# Patient Record
Sex: MALE | Race: WHITE | NOT HISPANIC OR LATINO | Employment: OTHER | URBAN - METROPOLITAN AREA
[De-identification: names, ages, dates, MRNs, and addresses within clinical notes are randomized per-mention and may not be internally consistent; named-entity substitution may affect disease eponyms.]

---

## 2017-03-16 ENCOUNTER — OFFICE VISIT (OUTPATIENT)
Dept: FAMILY MEDICINE | Facility: OTHER | Age: 62
End: 2017-03-16
Attending: FAMILY MEDICINE
Payer: COMMERCIAL

## 2017-03-16 VITALS
WEIGHT: 205 LBS | SYSTOLIC BLOOD PRESSURE: 140 MMHG | BODY MASS INDEX: 28.7 KG/M2 | HEART RATE: 86 BPM | HEIGHT: 71 IN | DIASTOLIC BLOOD PRESSURE: 82 MMHG | RESPIRATION RATE: 14 BRPM | TEMPERATURE: 98.7 F

## 2017-03-16 DIAGNOSIS — R07.0 THROAT PAIN: ICD-10-CM

## 2017-03-16 DIAGNOSIS — R68.89 FLU-LIKE SYMPTOMS: Primary | ICD-10-CM

## 2017-03-16 LAB
DEPRECATED S PYO AG THROAT QL EIA: NORMAL
FLUAV+FLUBV AG SPEC QL: NEGATIVE
FLUAV+FLUBV AG SPEC QL: NORMAL
MICRO REPORT STATUS: NORMAL
SPECIMEN SOURCE: NORMAL
SPECIMEN SOURCE: NORMAL

## 2017-03-16 PROCEDURE — 87081 CULTURE SCREEN ONLY: CPT | Performed by: FAMILY MEDICINE

## 2017-03-16 PROCEDURE — 87880 STREP A ASSAY W/OPTIC: CPT | Performed by: FAMILY MEDICINE

## 2017-03-16 PROCEDURE — 99213 OFFICE O/P EST LOW 20 MIN: CPT | Performed by: FAMILY MEDICINE

## 2017-03-16 PROCEDURE — 87804 INFLUENZA ASSAY W/OPTIC: CPT | Mod: 59 | Performed by: FAMILY MEDICINE

## 2017-03-16 RX ORDER — CODEINE PHOSPHATE AND GUAIFENESIN 10; 100 MG/5ML; MG/5ML
1-2 SOLUTION ORAL EVERY 6 HOURS PRN
Qty: 180 ML | Refills: 0 | Status: SHIPPED | OUTPATIENT
Start: 2017-03-16 | End: 2018-04-27

## 2017-03-16 NOTE — PROGRESS NOTES
"  SUBJECTIVE:                                                    Jose C Corona is a 61 year old male who presents to clinic today for the following health issues:    Acute Illness   Acute illness concerns: cough, congestion  Onset: 2 days ago (had been sick about one month ago, symptoms resolved, then \"returned\")    Fever: no    Chills/Sweats: YES- last night    Headache (location?): YES    Sinus Pressure:no    Conjunctivitis:  no    Ear Pain: no    Rhinorrhea: YES    Congestion: YES    Sore Throat: YES     Cough: YES-non-productive    Wheeze: no    Decreased Appetite: no    Nausea: no    Vomiting: no    Diarrhea:  no    Dysuria/Freq.: no    Fatigue/Achiness: no    Sick/Strep Exposure: no     Therapies Tried and outcome: Dayquil/Nyquil, helps some        Problem list and histories reviewed & adjusted, as indicated.  Additional history: as documented    Patient Active Problem List   Diagnosis     Mild persistent asthma     ACP (advance care planning)     Past Surgical History   Procedure Laterality Date     Circumcision       Orthopedic surgery  12/2013     left knee miniscus repair     Colonoscopy  2007     normal       Social History   Substance Use Topics     Smoking status: Never Smoker     Smokeless tobacco: Never Used     Alcohol use Yes      Comment: weekends, beer     Family History   Problem Relation Age of Onset     CANCER Mother 54     lung     HEART DISEASE Father 75     MI     Other Cancer Brother 65     PASSED FROM PROSTATE CANCER     Genitourinary Problems Brother      enlarged prostate         Current Outpatient Prescriptions   Medication Sig Dispense Refill     guaiFENesin-codeine (CHERATUSSIN AC) 100-10 MG/5ML SOLN solution Take 5-10 mLs by mouth every 6 hours as needed 180 mL 0     Beclomethasone Dipropionate (BECLOVENT IN)        fluticasone (FLOVENT HFA) 110 MCG/ACT inhaler Inhale 1 puff into the lungs 2 times daily 1 Inhaler 11     [DISCONTINUED] albuterol (PROAIR HFA, PROVENTIL HFA, VENTOLIN " "HFA) 108 (90 BASE) MCG/ACT inhaler Inhale 2 puffs into the lungs every 6 hours       No Known Allergies    Reviewed and updated as needed this visit by clinical staff  Tobacco  Allergies  Meds       Reviewed and updated as needed this visit by Provider         ROS:  Constitutional, HEENT, cardiovascular, pulmonary, gi and gu systems are negative, except as otherwise noted.    OBJECTIVE:                                                    /82 (BP Location: Left arm, Patient Position: Chair, Cuff Size: Adult Large)  Pulse 86  Temp 98.7  F (37.1  C) (Tympanic)  Resp 14  Ht 5' 10.75\" (1.797 m)  Wt 205 lb (93 kg)  BMI 28.79 kg/m2  Body mass index is 28.79 kg/(m^2).  GENERAL: healthy, alert and no distress  EYES: Eyes grossly normal to inspection, PERRL and conjunctivae and sclerae normal  HENT: ear canals and TM's normal, nose and mouth without ulcers or lesions  NECK: no adenopathy  RESP: lungs clear to auscultation - no rales, rhonchi or wheezes  CV: regular rate and rhythm, normal S1 S2, no S3 or S4, no murmur, click or rub, no peripheral edema and peripheral pulses strong  PSYCH: mentation appears normal, affect normal/bright    Diagnostic Test Results:  Results for orders placed or performed in visit on 03/16/17 (from the past 24 hour(s))   Influenza A/B antigen   Result Value Ref Range    Influenza A/B Agn Specimen Nasal     Influenza A Negative NEG    Influenza B  NEG     Negative   Test results must be correlated with clinical data. If necessary, results   should be confirmed by a molecular assay or viral culture.     Rapid strep screen   Result Value Ref Range    Specimen Description Throat     Rapid Strep A Screen       NEGATIVE: No Group A streptococcal antigen detected by immunoassay, await   culture report.      Micro Report Status FINAL 03/16/2017         ASSESSMENT/PLAN:                                                      1. Flu-like symptoms  Symptomatic cares recommended.  Use Flonase during the " day (has at home) and use cough medicine at night.  Follow-up if fevers develop or if symptoms aren't improving.  - Influenza A/B antigen  - Beta strep group A culture  - guaiFENesin-codeine (CHERATUSSIN AC) 100-10 MG/5ML SOLN solution; Take 5-10 mLs by mouth every 6 hours as needed  Dispense: 180 mL; Refill: 0    2. Throat pain  - Rapid strep screen        Jackelyn Avery MD  [facility]

## 2017-03-16 NOTE — MR AVS SNAPSHOT
"              After Visit Summary   3/16/2017    Jose C Corona    MRN: [id number]           Patient Information     Date Of Birth          1955        Visit Information        Provider Department      3/16/2017 8:45 AM Jackelyn Avery MD [facility]        Today's Diagnoses     Flu-like symptoms    -  1    Throat pain           Follow-ups after your visit        Follow-up notes from your care team     Return if symptoms worsen or fail to improve.      Who to contact     If you have questions or need follow up information about today's clinic visit or your schedule please contact [facility] directly at [phone].  Normal or non-critical lab and imaging results will be communicated to you by MyChart, letter or phone within 4 business days after the clinic has received the results. If you do not hear from us within 7 days, please contact the clinic through Oradhart or phone. If you have a critical or abnormal lab result, we will notify you by phone as soon as possible.  Submit refill requests through [employer] or call your pharmacy and they will forward the refill request to us. Please allow 3 business days for your refill to be completed.          Additional Information About Your Visit        MyChart Information     [employer] lets you send messages to your doctor, view your test results, renew your prescriptions, schedule appointments and more. To sign up, go to [email or web address] . Click on \"Log in\" on the left side of the screen, which will take you to the Welcome page. Then click on \"Sign up Now\" on the right side of the page.     You will be asked to enter the access code listed below, as well as some personal information. Please follow the directions to create your username and password.     Your access code is: [email or web address]  Expires: 2017  9:35 AM     Your access code will  in 90 days. If you need help or a new code, please call your [facility] [place] or " "[phone].        Care EveryWhere ID     This is your Care EveryWhere ID. This could be used by other organizations to access your [place] medical records  [id number]        Your Vitals Were     Pulse Temperature Respirations Height BMI (Body Mass Index)       86 98.7  F (37.1  C) (Tympanic) 14 5' 10.75\" (1.797 m) 28.79 kg/m2        Blood Pressure from Last 3 Encounters:   03/16/17 140/82   09/02/16 118/80   05/28/15 124/84    Weight from Last 3 Encounters:   03/16/17 205 lb (93 kg)   09/02/16 202 lb (91.6 kg)   05/28/15 210 lb (95.3 kg)              We Performed the Following     Beta strep group A culture     Influenza A/B antigen     Rapid strep screen          Today's Medication Changes          These changes are accurate as of: 3/16/17  9:35 AM.  If you have any questions, ask your nurse or doctor.               Start taking these medicines.        Dose/Directions    guaiFENesin-codeine 100-10 MG/5ML Soln solution   Commonly known as:  CHERATUSSIN AC   Used for:  Flu-like symptoms   Started by:  Jackelyn Avery MD        Dose:  1-2 tsp.   Take 5-10 mLs by mouth every 6 hours as needed   Quantity:  180 mL   Refills:  0            Where to get your medicines      Some of these will need a paper prescription and others can be bought over the counter.  Ask your nurse if you have questions.     Bring a paper prescription for each of these medications     guaiFENesin-codeine 100-10 MG/5ML Soln solution                Primary Care Provider Office Phone # Fax #    Jackelyn Avery [phone]       [facility] [address] [address]        Thank you!     Thank you for choosing [facility]  for your care. Our goal is always to provide you with excellent care. Hearing back from our patients is one way we can continue to improve our services. Please take a few minutes to complete the written survey that you may receive in the mail after your " visit with us. Thank you!             Your Updated Medication List - Protect others around you: Learn how to safely use, store and throw away your medicines at www.disposemymeds.org.          This list is accurate as of: 3/16/17  9:35 AM.  Always use your most recent med list.                   Brand Name Dispense Instructions for use    BECLOVENT IN          fluticasone 110 MCG/ACT Inhaler    FLOVENT HFA    1 Inhaler    Inhale 1 puff into the lungs 2 times daily       guaiFENesin-codeine 100-10 MG/5ML Soln solution    CHERATUSSIN AC    180 mL    Take 5-10 mLs by mouth every 6 hours as needed

## 2017-03-16 NOTE — NURSING NOTE
"Chief Complaint   Patient presents with     URI     Patient reports having a headache, sore throat and chest congestion.       Initial /82 (BP Location: Left arm, Patient Position: Chair, Cuff Size: Adult Large)  Pulse 86  Temp 98.7  F (37.1  C) (Tympanic)  Resp 14  Ht 5' 10.75\" (1.797 m)  Wt 205 lb (93 kg)  BMI 28.79 kg/m2 Estimated body mass index is 28.79 kg/(m^2) as calculated from the following:    Height as of this encounter: 5' 10.75\" (1.797 m).    Weight as of this encounter: 205 lb (93 kg).  Medication Reconciliation: complete   Elsa Serrano      "

## 2017-03-18 LAB
BACTERIA SPEC CULT: NORMAL
MICRO REPORT STATUS: NORMAL
SPECIMEN SOURCE: NORMAL

## 2017-03-20 ENCOUNTER — TELEPHONE (OUTPATIENT)
Dept: FAMILY MEDICINE | Facility: OTHER | Age: 62
End: 2017-03-20

## 2017-03-20 DIAGNOSIS — J06.9 UPPER RESPIRATORY TRACT INFECTION, UNSPECIFIED TYPE: Primary | ICD-10-CM

## 2017-03-20 RX ORDER — AZITHROMYCIN 250 MG/1
TABLET, FILM COATED ORAL
Qty: 6 TABLET | Refills: 0 | Status: SHIPPED | OUTPATIENT
Start: 2017-03-20 | End: 2021-10-04

## 2017-03-20 NOTE — TELEPHONE ENCOUNTER
Sen Thursday and given cough medication the cold has now settled in lungs with a lot of coughing up phlegm and due to asthma requesting mediation z pack has worked in the past

## 2017-09-11 DIAGNOSIS — J45.30 MILD PERSISTENT ASTHMA WITHOUT COMPLICATION: ICD-10-CM

## 2017-09-12 RX ORDER — DEXAMETHASONE 4 MG/1
TABLET ORAL
Qty: 12 G | Refills: 2 | Status: SHIPPED | OUTPATIENT
Start: 2017-09-12 | End: 2018-04-27

## 2018-04-13 ENCOUNTER — TRANSFERRED RECORDS (OUTPATIENT)
Dept: HEALTH INFORMATION MANAGEMENT | Facility: CLINIC | Age: 63
End: 2018-04-13

## 2018-04-27 ENCOUNTER — OFFICE VISIT (OUTPATIENT)
Dept: FAMILY MEDICINE | Facility: OTHER | Age: 63
End: 2018-04-27
Attending: FAMILY MEDICINE
Payer: COMMERCIAL

## 2018-04-27 VITALS
TEMPERATURE: 97.2 F | HEART RATE: 50 BPM | HEIGHT: 71 IN | WEIGHT: 207 LBS | DIASTOLIC BLOOD PRESSURE: 72 MMHG | RESPIRATION RATE: 15 BRPM | SYSTOLIC BLOOD PRESSURE: 126 MMHG | OXYGEN SATURATION: 97 % | BODY MASS INDEX: 28.98 KG/M2

## 2018-04-27 DIAGNOSIS — Z00.00 ROUTINE GENERAL MEDICAL EXAMINATION AT A HEALTH CARE FACILITY: Primary | ICD-10-CM

## 2018-04-27 DIAGNOSIS — J45.30 MILD PERSISTENT ASTHMA WITHOUT COMPLICATION: ICD-10-CM

## 2018-04-27 DIAGNOSIS — Z12.5 SPECIAL SCREENING FOR MALIGNANT NEOPLASM OF PROSTATE: ICD-10-CM

## 2018-04-27 DIAGNOSIS — Z12.11 SPECIAL SCREENING FOR MALIGNANT NEOPLASMS, COLON: ICD-10-CM

## 2018-04-27 PROCEDURE — G0103 PSA SCREENING: HCPCS | Performed by: FAMILY MEDICINE

## 2018-04-27 PROCEDURE — 80061 LIPID PANEL: CPT | Performed by: FAMILY MEDICINE

## 2018-04-27 PROCEDURE — 99396 PREV VISIT EST AGE 40-64: CPT | Performed by: FAMILY MEDICINE

## 2018-04-27 PROCEDURE — 36415 COLL VENOUS BLD VENIPUNCTURE: CPT | Performed by: FAMILY MEDICINE

## 2018-04-27 RX ORDER — FLUTICASONE PROPIONATE 110 UG/1
AEROSOL, METERED RESPIRATORY (INHALATION)
Qty: 12 G | Refills: 5 | Status: CANCELLED | OUTPATIENT
Start: 2018-04-27

## 2018-04-27 RX ORDER — FLUTICASONE PROPIONATE 110 UG/1
1 AEROSOL, METERED RESPIRATORY (INHALATION) 2 TIMES DAILY
Qty: 12 G | Refills: 5 | Status: SHIPPED | OUTPATIENT
Start: 2018-04-27 | End: 2019-06-03

## 2018-04-27 ASSESSMENT — ANXIETY QUESTIONNAIRES
GAD7 TOTAL SCORE: 0
6. BECOMING EASILY ANNOYED OR IRRITABLE: NOT AT ALL
1. FEELING NERVOUS, ANXIOUS, OR ON EDGE: NOT AT ALL
2. NOT BEING ABLE TO STOP OR CONTROL WORRYING: NOT AT ALL
7. FEELING AFRAID AS IF SOMETHING AWFUL MIGHT HAPPEN: NOT AT ALL
4. TROUBLE RELAXING: NOT AT ALL
5. BEING SO RESTLESS THAT IT IS HARD TO SIT STILL: NOT AT ALL
3. WORRYING TOO MUCH ABOUT DIFFERENT THINGS: NOT AT ALL

## 2018-04-27 ASSESSMENT — ASTHMA QUESTIONNAIRES
QUESTION_1 LAST FOUR WEEKS HOW MUCH OF THE TIME DID YOUR ASTHMA KEEP YOU FROM GETTING AS MUCH DONE AT WORK, SCHOOL OR AT HOME: NONE OF THE TIME
QUESTION_4 LAST FOUR WEEKS HOW OFTEN HAVE YOU USED YOUR RESCUE INHALER OR NEBULIZER MEDICATION (SUCH AS ALBUTEROL): NOT AT ALL
QUESTION_2 LAST FOUR WEEKS HOW OFTEN HAVE YOU HAD SHORTNESS OF BREATH: NOT AT ALL
QUESTION_5 LAST FOUR WEEKS HOW WOULD YOU RATE YOUR ASTHMA CONTROL: COMPLETELY CONTROLLED
QUESTION_3 LAST FOUR WEEKS HOW OFTEN DID YOUR ASTHMA SYMPTOMS (WHEEZING, COUGHING, SHORTNESS OF BREATH, CHEST TIGHTNESS OR PAIN) WAKE YOU UP AT NIGHT OR EARLIER THAN USUAL IN THE MORNING: NOT AT ALL
ACT_TOTALSCORE: 25

## 2018-04-27 ASSESSMENT — PAIN SCALES - GENERAL: PAINLEVEL: NO PAIN (0)

## 2018-04-27 NOTE — NURSING NOTE
"Chief Complaint   Patient presents with     Physical     small bowl of soup for lunch     Asthma       Initial /72 (BP Location: Left arm, Patient Position: Sitting, Cuff Size: Adult Large)  Pulse 50  Temp 97.2  F (36.2  C) (Tympanic)  Resp 15  Ht 5' 10.75\" (1.797 m)  Wt 207 lb (93.9 kg)  SpO2 97%  BMI 29.08 kg/m2 Estimated body mass index is 29.08 kg/(m^2) as calculated from the following:    Height as of this encounter: 5' 10.75\" (1.797 m).    Weight as of this encounter: 207 lb (93.9 kg).  Medication Reconciliation: complete     Alyssa Gill      "

## 2018-04-27 NOTE — PROGRESS NOTES
SUBJECTIVE:   CC: Jose C Corona is an 62 year old male who presents for preventative health visit.     Healthy Habits:    Do you get at least three servings of calcium containing foods daily (dairy, green leafy vegetables, etc.)? yes    Amount of exercise or daily activities, outside of work: active hobbies and chores    Problems taking medications regularly No    Medication side effects: No    Have you had an eye exam in the past two years? yes    Do you see a dentist twice per year? yes    Do you have sleep apnea, excessive snoring or daytime drowsiness?no       Asthma Follow-Up    Was ACT completed today?    Yes    ACT Total Scores 4/27/2018   ACT TOTAL SCORE -   ASTHMA ER VISITS -   ASTHMA HOSPITALIZATIONS -   ACT TOTAL SCORE (Goal Greater than or Equal to 20) 25   In the past 12 months, how many times did you visit the emergency room for your asthma without being admitted to the hospital? 0       Recent asthma triggers that patient is dealing with: pollens and animal dander        Today's PHQ-2 Score:   PHQ-2 ( 1999 Pfizer) 9/2/2016 5/28/2015   Q1: Little interest or pleasure in doing things 0 0   Q2: Feeling down, depressed or hopeless 0 0   PHQ-2 Score 0 0       Abuse: Current or Past(Physical, Sexual or Emotional)- No  Do you feel safe in your environment - Yes    Social History   Substance Use Topics     Smoking status: Never Smoker     Smokeless tobacco: Never Used     Alcohol use Yes      Comment: weekends, beer      If you drink alcohol do you typically have >3 drinks per day or >7 drinks per week? No                      Last PSA:   PSA   Date Value Ref Range Status   09/01/2016 3.78 0 - 4 ug/L Final     Comment:     Assay Method:  Chemiluminescence using Siemens Vista analyzer     Patient is due for colon cancer screening and would like referral to see Dr. Kendall.    Reviewed orders with patient. Reviewed health maintenance and updated orders accordingly - Yes  Patient Active Problem List    Diagnosis     Mild persistent asthma     ACP (advance care planning)     Past Surgical History:   Procedure Laterality Date     CIRCUMCISION       COLONOSCOPY  2007    normal     ORTHOPEDIC SURGERY  12/2013    left knee miniscus repair       Social History   Substance Use Topics     Smoking status: Never Smoker     Smokeless tobacco: Never Used     Alcohol use Yes      Comment: weekends, beer     Family History   Problem Relation Age of Onset     CANCER Mother 54     lung     HEART DISEASE Father 75     MI     Other Cancer Brother 65     PASSED FROM PROSTATE CANCER     Genitourinary Problems Brother      enlarged prostate         Current Outpatient Prescriptions   Medication Sig Dispense Refill     fluticasone (FLOVENT HFA) 110 MCG/ACT Inhaler Inhale 1 puff into the lungs 2 times daily 12 g 5     Beclomethasone Dipropionate (BECLOVENT IN)        [DISCONTINUED] albuterol (PROAIR HFA, PROVENTIL HFA, VENTOLIN HFA) 108 (90 BASE) MCG/ACT inhaler Inhale 2 puffs into the lungs every 6 hours       Allergies   Allergen Reactions     Seasonal Allergies        Reviewed and updated as needed this visit by clinical staff  Tobacco  Allergies  Meds  Problems  Med Hx  Surg Hx  Fam Hx  Soc Hx          Reviewed and updated as needed this visit by Provider            ROS:  C: NEGATIVE for fever, chills, change in weight  I: NEGATIVE for worrisome rashes, moles or lesions  E: NEGATIVE for vision changes or irritation  ENT: NEGATIVE for ear, mouth and throat problems  R: NEGATIVE for significant cough or SOB  CV: NEGATIVE for chest pain, palpitations or peripheral edema  GI: NEGATIVE for nausea, abdominal pain, heartburn, or change in bowel habits   male: negative for dysuria, hematuria, decreased urinary stream, erectile dysfunction, urethral discharge  M: NEGATIVE for significant arthralgias or myalgia  N: NEGATIVE for weakness, dizziness or paresthesias  P: NEGATIVE for changes in mood or affect    OBJECTIVE:   /72  "(BP Location: Left arm, Patient Position: Sitting, Cuff Size: Adult Large)  Pulse 50  Temp 97.2  F (36.2  C) (Tympanic)  Resp 15  Ht 5' 10.75\" (1.797 m)  Wt 207 lb (93.9 kg)  SpO2 97%  BMI 29.08 kg/m2  EXAM:  GENERAL: healthy, alert and no distress  EYES: Eyes grossly normal to inspection, PERRL and conjunctivae and sclerae normal  HENT: ear canals and TM's normal, nose and mouth without ulcers or lesions  NECK: no adenopathy  RESP: lungs clear to auscultation - no rales, rhonchi or wheezes  CV: regular rate and rhythm, normal S1 S2, no S3 or S4, no murmur, click or rub, no peripheral edema and peripheral pulses strong  ABDOMEN: soft, nontender, no hepatosplenomegaly, no masses and bowel sounds normal  MS: no gross musculoskeletal defects noted, no edema  SKIN: no suspicious lesions or rashes  NEURO: Normal strength and tone, mentation intact and speech normal  PSYCH: mentation appears normal, affect normal/bright    ASSESSMENT/PLAN:       ICD-10-CM    1. Routine general medical examination at a health care facility Z00.00 Lipid Profile   2. Mild persistent asthma without complication J45.30 fluticasone (FLOVENT HFA) 110 MCG/ACT Inhaler   3. Special screening for malignant neoplasms, colon Z12.11 GENERAL SURG ADULT REFERRAL   4. Special screening for malignant neoplasm of prostate Z12.5 PSA, screen       COUNSELING:  Reviewed preventive health counseling, as reflected in patient instructions       reports that he has never smoked. He has never used smokeless tobacco.    Estimated body mass index is 29.08 kg/(m^2) as calculated from the following:    Height as of this encounter: 5' 10.75\" (1.797 m).    Weight as of this encounter: 207 lb (93.9 kg).       Counseling Resources:  ATP IV Guidelines  Pooled Cohorts Equation Calculator  FRAX Risk Assessment  ICSI Preventive Guidelines  Dietary Guidelines for Americans, 2010  USDA's MyPlate  ASA Prophylaxis  Lung CA Screening      Jackelyn Avery MD  [place] [facility]

## 2018-04-27 NOTE — MR AVS SNAPSHOT
After Visit Summary   4/27/2018    Jose C Corona    MRN: [id number]           Patient Information     Date Of Birth          1955        Visit Information        Provider Department      4/27/2018 3:00 PM Jackelyn Avery MD [facility]        Today's Diagnoses     Routine general medical examination at a health care facility    -  1    Mild persistent asthma without complication        Special screening for malignant neoplasms, colon        Special screening for malignant neoplasm of prostate          Care Instructions      Preventive Health Recommendations  Male Ages 50 - 64    Yearly exam:             See your health care provider every year in order to  o   Review health changes.   o   Discuss preventive care.    o   Review your medicines if your doctor has prescribed any.     Have a cholesterol test every 5 years, or more frequently if you are at risk for high cholesterol/heart disease.     Have a diabetes test (fasting glucose) every three years. If you are at risk for diabetes, you should have this test more often.     Have a colonoscopy at age 50, or have a yearly FIT test (stool test). These exams will check for colon cancer.      Talk with your health care provider about whether or not a prostate cancer screening test (PSA) is right for you.    You should be tested each year for STDs (sexually transmitted diseases), if you re at risk.     Shots: Get a flu shot each year. Get a tetanus shot every 10 years.     Nutrition:    Eat at least 5 servings of fruits and vegetables daily.     Eat whole-grain bread, whole-wheat pasta and brown rice instead of white grains and rice.     Talk to your provider about Calcium and Vitamin D.     Lifestyle    Exercise for at least 150 minutes a week (30 minutes a day, 5 days a week). This will help you control your weight and prevent disease.     Limit alcohol to one drink per day.     No smoking.     Wear sunscreen to prevent skin  "cancer.     See your dentist every six months for an exam and cleaning.     See your eye doctor every 1 to 2 years.            Follow-ups after your visit        Additional Services     GENERAL SURG ADULT REFERRAL       Your provider has referred you to: Dr. Kendall    Please be aware that coverage of these services is subject to the terms and limitations of your health insurance plan.  Call member services at your health plan with any benefit or coverage questions.      Please bring the following with you to your appointment:    (1) Any X-Rays, CTs or MRIs which have been performed.  Contact the facility where they were done to arrange for  prior to your scheduled appointment.   (2) List of current medications   (3) This referral request   (4) Any documents/labs given to you for this referral                  Follow-up notes from your care team     Return in about 1 year (around 4/27/2019).      Who to contact     If you have questions or need follow up information about today's clinic visit or your schedule please contact [facility] directly at [phone].  Normal or non-critical lab and imaging results will be communicated to you by [employer], letter or phone within 4 business days after the clinic has received the results. If you do not hear from us within 7 days, please contact the clinic through [employer] or phone. If you have a critical or abnormal lab result, we will notify you by phone as soon as possible.  Submit refill requests through [employer] or call your pharmacy and they will forward the refill request to us. Please allow 3 business days for your refill to be completed.          Additional Information About Your Visit        [employer] Information     [employer] lets you send messages to your doctor, view your test results, renew your prescriptions, schedule appointments and more. To sign up, go to [email or web address] . Click on \"Log in\" on the left side of the screen, which will " "take you to the Welcome page. Then click on \"Sign up Now\" on the right side of the page.     You will be asked to enter the access code listed below, as well as some personal information. Please follow the directions to create your username and password.     Your access code is: [email or web address]  Expires: 2018  8:59 AM     Your access code will  in 90 days. If you need help or a new code, please call your [place] clinic or [phone].        Care EveryWhere ID     This is your Care EveryWhere ID. This could be used by other organizations to access your [place] medical records  [id number]        Your Vitals Were     Pulse Temperature Respirations Height Pulse Oximetry BMI (Body Mass Index)    50 97.2  F (36.2  C) (Tympanic) 15 5' 10.75\" (1.797 m) 97% 29.08 kg/m2       Blood Pressure from Last 3 Encounters:   18 126/72   17 140/82   16 118/80    Weight from Last 3 Encounters:   18 207 lb (93.9 kg)   17 205 lb (93 kg)   16 202 lb (91.6 kg)              We Performed the Following     GENERAL SURG ADULT REFERRAL     Lipid Profile     PSA, screen          Today's Medication Changes          These changes are accurate as of 18 11:59 PM.  If you have any questions, ask your nurse or doctor.               These medicines have changed or have updated prescriptions.        Dose/Directions    fluticasone 110 MCG/ACT Inhaler   Commonly known as:  FLOVENT HFA   This may have changed:  See the new instructions.   Used for:  Mild persistent asthma without complication   Changed by:  Jackelyn Avery MD        Dose:  1 puff   Inhale 1 puff into the lungs 2 times daily   Quantity:  12 g   Refills:  5         Stop taking these medicines if you haven't already. Please contact your care team if you have questions.     guaiFENesin-codeine 100-10 MG/5ML Soln solution   Commonly known as:  CHERATUSSIN AC   Stopped by:  Jackelyn Avery MD                Where to get your " medicines      These medications were sent to [id number] [place] [id number]  [address] [facility] [address]  [address] [place] [address]     Phone:  [phone]     fluticasone 110 MCG/ACT Inhaler                Primary Care Provider Office Phone # Fax #    Jackelyn [address]AZAR [address] [phone] [address] [facility] [address]        Equal Access to Services     CELIA CHÁVEZ : Hadii aad ku hadasho Soomaali, waaxda luqadaha, qaybta kaalmada adeegyada, waxay idiin hayaan adeeg kharapalak labarbara blas. [facility] [phone].    ATENCIÓN: Si habla español, tiene a lyons disposición servicios gratuitos de asistencia lingüística. [facility] [phone].    We comply with applicable federal civil rights laws and [place] laws. We do not discriminate on the basis of race, color, national origin, age, disability, sex, sexual orientation, or gender identity.            Thank you!     Thank you for choosing [facility]  for your care. Our goal is always to provide you with excellent care. Hearing back from our patients is one way we can continue to improve our services. Please take a few minutes to complete the written survey that you may receive in the mail after your visit with us. Thank you!             Your Updated Medication List - Protect others around you: Learn how to safely use, store and throw away your medicines at www.disposemymeds.org.          This list is accurate as of 4/27/18 11:59 PM.  Always use your most recent med list.                   Brand Name Dispense Instructions for use Diagnosis    BECLOVENT IN           fluticasone 110 MCG/ACT Inhaler    FLOVENT HFA    12 g    Inhale 1 puff into the lungs 2 times daily    Mild persistent asthma without complication

## 2018-04-27 NOTE — LETTER
My Asthma Action Plan  Name: Jose C Corona   YOB: 1955  Date: 4/27/2018   My doctor: Jackelyn Avery MD   My clinic: [facility]        My Control Medicine: beclovent one puff daily  My Rescue Medicine: Albuterol (Proair/Ventolin/Proventil) inhaler 110   My Asthma Severity: intermittent  Avoid your asthma triggers: pollens and animal dander               GREEN ZONE   Good Control    I feel good    No cough or wheeze    Can work, sleep and play without asthma symptoms       Take your asthma control medicine every day.     1. If exercise triggers your asthma, take your rescue medication    15 minutes before exercise or sports, and    During exercise if you have asthma symptoms  2. Spacer to use with inhaler: If you have a spacer, make sure to use it with your inhaler             YELLOW ZONE Getting Worse  I have ANY of these:    I do not feel good    Cough or wheeze    Chest feels tight    Wake up at night   1. Keep taking your Green Zone medications  2. Start taking your rescue medicine:    every 20 minutes for up to 1 hour. Then every 4 hours for 24-48 hours.  3. If you stay in the Yellow Zone for more than 12-24 hours, contact your doctor.  4. If you do not return to the Green Zone in 12-24 hours or you get worse, start taking your oral steroid medicine if prescribed by your provider.           RED ZONE Medical Alert - Get Help  I have ANY of these:    I feel awful    Medicine is not helping    Breathing getting harder    Trouble walking or talking    Nose opens wide to breathe       1. Take your rescue medicine NOW  2. If your provider has prescribed an oral steroid medicine, start taking it NOW  3. Call your doctor NOW  4. If you are still in the Red Zone after 20 minutes and you have not reached your doctor:    Take your rescue medicine again and    Call 911 or go to the emergency room right away    See your regular doctor within 2 weeks of an Emergency Room or Urgent Care visit  for follow-up treatment.          Annual Reminders:  Meet with Asthma Educator,  Flu Shot in the Fall, consider Pneumonia Vaccination for patients with asthma (aged 19 and older).    Pharmacy: [facility] [address] [place] [address] [address] [facility] [address]                      Asthma Triggers  How To Control Things That Make Your Asthma Worse    Triggers are things that make your asthma worse.  Look at the list below to help you find your triggers and what you can do about them.  You can help prevent asthma flare-ups by staying away from your triggers.      Trigger                                                          What you can do   Cigarette Smoke  Tobacco smoke can make asthma worse. Do not allow smoking in your home, car or around you.  Be sure no one smokes at a child s day care or school.  If you smoke, ask your health care provider for ways to help you quit.  Ask family members to quit too.  Ask your health care provider for a referral to Quit Plan to help you quit smoking, or call 3-488-172-PLAN.     Colds, Flu, Bronchitis  These are common triggers of asthma. Wash your hands often.  Don t touch your eyes, nose or mouth.  Get a flu shot every year.     Dust Mites  These are tiny bugs that live in cloth or carpet. They are too small to see. Wash sheets and blankets in hot water every week.   Encase pillows and mattress in dust mite proof covers.  Avoid having carpet if you can. If you have carpet, vacuum weekly.   Use a dust mask and HEPA vacuum.   Pollen and Outdoor Mold  Some people are allergic to trees, grass, or weed pollen, or molds. Try to keep your windows closed.  Limit time out doors when pollen count is high.   Ask you health care provider about taking medicine during allergy season.     Animal Dander  Some people are allergic to skin flakes, urine or saliva from pets with fur or feathers. Keep pets with fur or feathers out of your home.    If you can t keep the pet  outdoors, then keep the pet out of your bedroom.  Keep the bedroom door closed.  Keep pets off cloth furniture and away from stuffed toys.     Mice, Rats, and Cockroaches  Some people are allergic to the waste from these pests.   Cover food and garbage.  Clean up spills and food crumbs.  Store grease in the refrigerator.   Keep food out of the bedroom.   Indoor Mold  This can be a trigger if your home has high moisture. Fix leaking faucets, pipes, or other sources of water.   Clean moldy surfaces.  Dehumidify basement if it is damp and smelly.   Smoke, Strong Odors, and Sprays  These can reduce air quality. Stay away from strong odors and sprays, such as perfume, powder, hair spray, paints, smoke incense, paint, cleaning products, candles and new carpet.   Exercise or Sports  Some people with asthma have this trigger. Be active!  Ask your doctor about taking medicine before sports or exercise to prevent symptoms.    Warm up for 5-10 minutes before and after sports or exercise.     Other Triggers of Asthma  Cold air:  Cover your nose and mouth with a scarf.  Sometimes laughing or crying can be a trigger.  Some medicines and food can trigger asthma.

## 2018-04-28 ASSESSMENT — PATIENT HEALTH QUESTIONNAIRE - PHQ9: SUM OF ALL RESPONSES TO PHQ QUESTIONS 1-9: 0

## 2018-04-28 ASSESSMENT — ANXIETY QUESTIONNAIRES: GAD7 TOTAL SCORE: 0

## 2018-04-30 ENCOUNTER — TELEPHONE (OUTPATIENT)
Dept: FAMILY MEDICINE | Facility: OTHER | Age: 63
End: 2018-04-30

## 2018-04-30 LAB
CHOLEST SERPL-MCNC: 176 MG/DL
HDLC SERPL-MCNC: 65 MG/DL
LDLC SERPL CALC-MCNC: 93 MG/DL
NONHDLC SERPL-MCNC: 111 MG/DL
PSA SERPL-ACNC: 3.84 UG/L (ref 0–4)
TRIGL SERPL-MCNC: 90 MG/DL

## 2018-04-30 NOTE — TELEPHONE ENCOUNTER
recently seen for physical and mentioned a bump on head that appeared in July and has never fully gone away even though it has gotten smaller,  It was forgotten at appt for you to examine, wondering what he needs to do

## 2018-05-10 ENCOUNTER — OFFICE VISIT (OUTPATIENT)
Dept: SURGERY | Facility: OTHER | Age: 63
End: 2018-05-10
Attending: SURGERY
Payer: COMMERCIAL

## 2018-05-10 VITALS
HEART RATE: 51 BPM | OXYGEN SATURATION: 98 % | SYSTOLIC BLOOD PRESSURE: 128 MMHG | WEIGHT: 208.4 LBS | TEMPERATURE: 96.4 F | DIASTOLIC BLOOD PRESSURE: 72 MMHG | BODY MASS INDEX: 29.18 KG/M2 | HEIGHT: 71 IN

## 2018-05-10 DIAGNOSIS — Z12.11 SPECIAL SCREENING FOR MALIGNANT NEOPLASMS, COLON: ICD-10-CM

## 2018-05-10 DIAGNOSIS — L98.9 FACIAL LESION: Primary | ICD-10-CM

## 2018-05-10 DIAGNOSIS — L98.9 SCALP LESION: ICD-10-CM

## 2018-05-10 PROCEDURE — 11422 EXC H-F-NK-SP B9+MARG 1.1-2: CPT | Performed by: SURGERY

## 2018-05-10 PROCEDURE — 88305 TISSUE EXAM BY PATHOLOGIST: CPT | Mod: TC | Performed by: SURGERY

## 2018-05-10 PROCEDURE — 12031 INTMD RPR S/A/T/EXT 2.5 CM/<: CPT | Performed by: SURGERY

## 2018-05-10 PROCEDURE — 99204 OFFICE O/P NEW MOD 45 MIN: CPT | Mod: 25 | Performed by: SURGERY

## 2018-05-10 ASSESSMENT — PAIN SCALES - GENERAL: PAINLEVEL: NO PAIN (0)

## 2018-05-10 NOTE — MR AVS SNAPSHOT
"              After Visit Summary   5/10/2018    Jose C Corona    MRN: [id number]           Patient Information     Date Of Birth          1955        Visit Information        Provider Department      5/10/2018 10:00 AM David Kendall MD [facility] [place]        Today's Diagnoses     Facial lesion    -  1    Special screening for malignant neoplasms, colon          Care Instructions    Thank you for allowing Dr. Kendall and the surgical team to participate in your care today.Please call with any scheduling questions to our Unit Health Coordinator Jessica at [phone] or any nursing questions to Omayra [phone]       POST PROCEDURE INSTRUCTIONS      Apply ice to the surgical area to reduce swelling. (no longer than 20 minutes at a time)    Remove your dressing in 24 hours.    Wash incision with soap and water twice daily.    Keep incision clean and dry   Do NOT soak in water such as a tub bath or swimming   Do NOT do dishes or \"dirty work\"   Do NOT put make-up, deodorant, powders, hairspray, lotions, etc on the incision    Apply antibiotic ointment twice daily    Cover with a clean dressing daily or when wet/soiled    If you have steri-strips, these will fall off on their own in 7 days. If they are still adhered after 7 days, you may remove them by pulling gently.     Do NOT use aspirin/NSAIDS (Motrin, Ibuprofen, Aleve, etc..) for 7 days    You can use acetaminophen(Tylenol) or the prescription you received for pain.       If you have any bleeding, cover the wound with clean gauze and hold pressure for 10 Minutes. If the bleeding does not stop or is heavy and profuse, call the clinic or go to the Urgent Care/Emergency Department.      SIGNS OF INFECTION ARE:      Redness, swelling, red streaks, pus, drainage, warmth, fever, increased pain, foul smell.     Contact your primary health care provider if you notice any of the warning signs.     FOLLOW - UP      Follow-up in clinic with Dr." Enoch in 7-10 days for suture removal.     Pathology results will also be discussed at that time.           You will be contacted by clinic scheduling to schedule your ENT referral appointment for nose lesion. Please call our office at [phone] if you are not contacted in a timely manner.       Guide to your Colonoscopy with GOLYTELY preparation      Date of Procedure 6/7/18               with Dr. Kendall    At [facility]  Address: [address] [place] [id number]    Admit time: [facility]  will call you 1-2 days prior with your admit time. If your surgery is on Monday, please expect a call on Thursday or Friday. If you are not contacted by the afternoon prior to your surgery; please call [facility] at [phone].          YOUR UPCOMING COLONOSCOPY    At [facility], we want to make sure that your colonoscopy is as pleasant as possible. This guide is designed to answer any questions you might have and to walk you through the preparations you will need to make before your procedure.    Should you have additional questions, please feel free to contact us. Contact numbers are listed below. Thank you for choosing [facility].    Important Numbers    Clinic Health Unit Coordinator: [phone]  Clinic Nurse: [phone] (or [phone]; [phone])  [facility]: [phone] (fax [phone])    All nursing questions or concerns can be directed to the clinic or surgery education nurse    If you have a scheduling or appointment question, or need to postoone your procedure, please call the Health Unit Coordinator between 8am and 4pm Monday through Friday.    SPECIAL INSTRUCTIONS: (Diabetes, blood thinners)                COLONOSCOPY PREP    7 DAYS BEFORE THE EXAM:     Do not take Aspirin or other NSAIDS (Ibuprofen, Motrin, Aleve, Celebrex, Naproxen, etc) 7 days before your surgery. Tylenol is fine.    If you are prescribed blood  thinners (Aspirin, Coumadin/Warfarin, Plavix, etc) talk to your provider.    Stop taking fiber supplements, vitamins, iron or multivitamins that contain iron.    If you are a diabetic and take medications to control your blood sugar, follow the special instructions listed or talk to your provider.     Arrange transportation with a family member or friend to drive you home and have an adult available to stay with you for the next 4 hours when you arrive home for your safety. If you need to take a taxi or the bus, you MUST have a responsible adult to ride with you OR YOUR PROCEDURE WILL BE CANCELLED. It is recommended that you DO NOT DRIVE for the next 24 hours after receiving anesthesia.      prescriptions at your pharmacy. If it has been more than one week since your appointment was scheduled, please call your pharmacy to verify it is still ready for .     Call the Clinic Nurse or [facility] if you should become ill within 1 week of your procedure and we will reschedule it when you are healthy. This includes sings or symptoms of a cold or the flu. This can include fever, chills, sore throat, cough, chest congestions, productive cough, runny nose.     1 DAY BEFORE THE EXAM:    DO NOT EAT ANY SOLID FOOD OR MILK PRODUCTS AFTER 12:00 AM (MIDNIGHT).  Drink only clear liquids for breakfast, lunch and dinner. (No red or purple colors as these colors can be mistaken for blood.)  Clear liquids include water, juices without pulp, soft drinks, broth, bouillon, black coffee without cream, tea, Jarad-Aid, Gatorade, Jell-O and popsicles. No red or purple colors.  Follow the instructions of your colon preparation.         CLEAR LIQUID DIET    You may have:  Tea, coffee (no cream)  Water, vitamin water, smart water, coconut water, PowerAde, Propel, Soda that is not red or purple (Sprite, 7-up, ginger ale), Gatorade that is not red or purple  Clear nutrition drinks (Resource Breeze, Ensure Active protein  drink peach flavor)  Jell-O (not red or purple), Popsicles (without milk or fruit pieces and not red or purple), Romansh ice (not red or purple)  Fat-free broth or bouillon  Plain hard candy such as life savers (not red or purple)  Powdered lemonade such as Crystal Light or Country Time  Clear juices and fruit-flavored drinks such as apple juice, white grape juice, Hi-C and Jarad-Aid (not red or purple)  Honey, sugar    Do not have:  Milk or milk products such as ice cream, malts, or shakes  Red or purple drinks of any kind  Cranberry or grape juice  Red or purple Jell-O, popsicles, Jarad-Aid, Sorber and candy  Orange, grapefruit, pineapple or tomato juices or juices with pulp  Cream soups of any kind  Alcohol    BETWEEN 4:00PM and 6:00PM PRIOR TO EXAM:    Drink one 8 ounce glass every 10 minutes until gone. Drink each glass quickly rather than sipping.  If the liquid is too salty, you may use a straw. Drink the whole gallon of Golytely. You must complete the entire gallon as directed.  You will be passing light yellow or clear watery stool.       DAY OF COLONOSCOPY PROCEDURE:     You many have clear liquids up until 3 hours before you check in at admitting.  Wear comfortable clothes. No jewelry, body piercings, make-up, nail polish, hair spray, lotions, perfumes or colognes. Shower before you arrive.  Take blood pressure and heart medications as usual with a sip of water.    You must have a  with you and and adult available to stay with your for 4 hours at home. The medicine used in this test will make you sleepy. If you do not have someone, please reschedule or your test will be cancelled.  It is recommended that you do not drive for 24 hours after your test. Do not operate power equipment, drink alcoholic beverages, make important decisions or sign legal documents.     COLONOSCOPY FREQUENTLY ASKED QUESTIONS    What is a colonoscopy?    A colonoscopy is a test to look at the lining of your large intestine. The  "purpose of the exam is to check for abnormalities including growths called \"polyps\" that can lead to serious disease. A flexibles scope is inserted into your rectum by the doctor to examine your large intestine.    What are polyps?  Polyps are abnormal growths on the lining of the colon. Most polyps are not cancerous, but some polyps have the potential to turn into cancer with time. Polyps can also bleed. For these reasons, most polyps are removed during a colonoscopy and sent to the laboratory for microscopic examination.    What preparation is needed?  The colon must be completely clean for the procedure to be performed. You may be given one or two different prep solutions to cleanse your bowel. You will also need to follow a clear liquid diet the day before your procedure.    What happens after the procedure?  After your procedure is complete, you will be taken back to your day surgery room where you will be monitored for approximately 1 hour. You can expect to feel drowsy for several hours afterward. You may experience some cramping or bloating due to the air introduced into your colon during the exam. You will not be able to drive or operate machinery the rest of the day. You will be given written discharge instructions and appropriate learning material before you go home. You must have an adult to stay at home with you for the next 4 hours after you leave the hospital for your safety.    When will I find out the results of my test?  Your surgeon will talk to you and your designated  before you leave and usually the preliminary results can be given to you at that time. If a biopsy was taken during your procedure, it will be sent to the laboratory for examination. Results usually take one week. You will be contacted by phone or by letter with results.      TIPS FOR COLON CLEANSING BEFORE YOUR COLONOSCOPY    To get accurate results from your exam, your colon must be completely clean and empty. Please follow " your doctor's instructions. If you do not, you may need to repeat both the exam and colon-cleansing process.    The medicine you take may cause bloating, nausea and other discomfort. Follow these tips to make the process as easy as possible:     You may use alcohol-free baby wipes to ease anal irritation. You may also use Vaseline to help protect the skin. Other options include Tucks wipes, hemorrhoid treatments and hydrocortisone cream.     You may wish to squeeze some lemon juice into your preparation or add a packet of Crystal Light lemon-lime or ice tea flavor. (remember to not use red or purple)    To chill the solution, put it in your refrigerator or set it in a bowl of ice. Do not add ice in your drinking glass. You may remove the colon preparation from the refrigerator 15-30 minutes before drinking.    Quickly drink one whole glass every 5 to 10 minutes. It may help to use a timer. If the liquid is too salty, use a straw.    Stay near a toilet!    You will have diarrhea (loose watery stools) and may also have chills. Dress for comfort.    Expect to feel discomfort until the stool clears from your colon. This usually takes about 2 to 4 hours.    Even when you are sitting on the toilet, keep drinking a glass of solution every 10-15 minutes.    If you have nausea or vomiting, rinse your mouth with water. Take a break for 15 to 30 minutes, and then keep drinking the solution.    Some people find it helpful to suck on a wedge of lemon or lime. You may also try sucking on hard candy (not red or purple) or washing your mouth out with water, clear soda or mouthwash.    If you followed your doctor's orders and your stool is a clear or yellow liquid, you are ready for the exam.    If you are not sure if your colon is clean, please call your clinic and ask to speak to a nurse.                   Follow-ups after your visit        Additional Services     OTOLARYNGOLOGY REFERRAL       Your provider has referred you to:  "Miamichente Mota  [place] [phone]   [email or web address] for skin lesion on nose      Please be aware that coverage of these services is subject to the terms and limitations of your health insurance plan.  Call member services at your health plan with any benefit or coverage questions.      Please bring the following with you to your appointment:    (1) Any X-Rays, CTs or MRIs which have been performed.  Contact the facility where they were done to arrange for  prior to your scheduled appointment.   (2) List of current medications  (3) This referral request   (4) Any documents/labs given to you for this referral                  Who to contact     If you have questions or need follow up information about today's clinic visit or your schedule please contact [facility] directly at [phone].  Normal or non-critical lab and imaging results will be communicated to you by MyChart, letter or phone within 4 business days after the clinic has received the results. If you do not hear from us within 7 days, please contact the clinic through MyChart or phone. If you have a critical or abnormal lab result, we will notify you by phone as soon as possible.  Submit refill requests through [employer] or call your pharmacy and they will forward the refill request to us. Please allow 3 business days for your refill to be completed.          Additional Information About Your Visit        [email or web address] Information     [employer] lets you send messages to your doctor, view your test results, renew your prescriptions, schedule appointments and more. To sign up, go to [email or web address] . Click on \"Log in\" on the left side of the screen, which will take you to the Welcome page. Then click on \"Sign up Now\" on the right side of the page.     You will be asked to enter the access code listed below, as well as some personal information. Please follow the " "directions to create your username and password.     Your access code is: [email or web address]  Expires: 2018  8:59 AM     Your access code will  in 90 days. If you need help or a new code, please call your [place] clinic or [phone].        Care EveryWhere ID     This is your Care EveryWhere ID. This could be used by other organizations to access your [facility] medical records  [id number]        Your Vitals Were     Pulse Temperature Height Pulse Oximetry BMI (Body Mass Index)       51 96.4  F (35.8  C) 5' 11\" (1.803 m) 98% 29.07 kg/m2        Blood Pressure from Last 3 Encounters:   05/10/18 128/72   18 126/72   17 140/82    Weight from Last 3 Encounters:   05/10/18 208 lb 6.4 oz (94.5 kg)   18 207 lb (93.9 kg)   17 205 lb (93 kg)              We Performed the Following     OTOLARYNGOLOGY REFERRAL        Primary Care Provider Office Phone # Fax #    Jackelyn Avery [phone] [address] [place] [address]        Equal Access to Services     CELIA CHÁVEZ : Hadii swathi morales hadasho Soomaali, waaxda luqadaha, qaybta kaalmada adeegyada, yamilet blas. So [facility] [phone].    ATENCIÓN: Si habla español, tiene a lyons disposición servicios gratuitos de asistencia lingüística. Llame al [phone].    We comply with applicable federal civil rights laws and [place] laws. We do not discriminate on the basis of race, color, national origin, age, disability, sex, sexual orientation, or gender identity.            Thank you!     Thank you for choosing [facility]  for your care. Our goal is always to provide you with excellent care. Hearing back from our patients is one way we can continue to improve our services. Please take a few minutes to complete the written survey that you may receive in the mail after your visit with us. Thank you!             Your Updated Medication List - Protect others around you: Learn " how to safely use, store and throw away your medicines at www.disposemymeds.org.          This list is accurate as of 5/10/18 10:58 AM.  Always use your most recent med list.                   Brand Name Dispense Instructions for use Diagnosis    BECLOVENT IN      Inhale 1-2 puffs into the lungs as needed        fluticasone 110 MCG/ACT Inhaler    FLOVENT HFA    12 g    Inhale 1 puff into the lungs 2 times daily    Mild persistent asthma without complication

## 2018-05-10 NOTE — PATIENT INSTRUCTIONS
"Thank you for allowing Dr. Kendall and the surgical team to participate in your care today.Please call with any scheduling questions to our Unit Health Coordinator Jessica at [phone] or any nursing questions to Omayra [phone]       POST PROCEDURE INSTRUCTIONS      Apply ice to the surgical area to reduce swelling. (no longer than 20 minutes at a time)    Remove your dressing in 24 hours.    Wash incision with soap and water twice daily.    Keep incision clean and dry   Do NOT soak in water such as a tub bath or swimming   Do NOT do dishes or \"dirty work\"   Do NOT put make-up, deodorant, powders, hairspray, lotions, etc on the incision    Apply antibiotic ointment twice daily    Cover with a clean dressing daily or when wet/soiled    Do NOT use aspirin/NSAIDS (Motrin, Ibuprofen, Aleve, etc..) for 7 days    You can use acetaminophen(Tylenol) or the prescription you received for pain.       If you have any bleeding, cover the wound with clean gauze and hold pressure for 10 Minutes. If the bleeding does not stop or is heavy and profuse, call the clinic or go to the Urgent Care/Emergency Department.      SIGNS OF INFECTION ARE:      Redness, swelling, red streaks, pus, drainage, warmth, fever, increased pain, foul smell.     Contact your primary health care provider if you notice any of the warning signs.             You will be contacted by clinic scheduling to schedule your ENT referral appointment for nose lesion. Please call our office at [phone] if you are not contacted in a timely manner.       Guide to your Colonoscopy with GOLYTELY preparation      Date of Procedure 6/7/18               with Dr. Kendall    At [facility]  Address: [address] [place] [id number]    Admit time: [facility]  will call you 1-2 days prior with your admit time. If your surgery is on Monday, please expect a call on Thursday or Friday. If you are not contacted by the afternoon " prior to your surgery; please call [place] at [phone].          YOUR UPCOMING COLONOSCOPY    At [facility], we want to make sure that your colonoscopy is as pleasant as possible. This guide is designed to answer any questions you might have and to walk you through the preparations you will need to make before your procedure.    Should you have additional questions, please feel free to contact us. Contact numbers are listed below. Thank you for choosing [facility].    Important Numbers    Clinic Health Unit Coordinator: [phone]  Clinic Nurse: [phone] (or [phone]; [phone])  [facility]: [phone] (fax [phone])    All nursing questions or concerns can be directed to the clinic or surgery education nurse    If you have a scheduling or appointment question, or need to postoone your procedure, please call the Health Unit Coordinator between 8am and 4pm Monday through Friday.    SPECIAL INSTRUCTIONS: (Diabetes, blood thinners)                COLONOSCOPY PREP    7 DAYS BEFORE THE EXAM:     Do not take Aspirin or other NSAIDS (Ibuprofen, Motrin, Aleve, Celebrex, Naproxen, etc) 7 days before your surgery. Tylenol is fine.    If you are prescribed blood thinners (Aspirin, Coumadin/Warfarin, Plavix, etc) talk to your provider.    Stop taking fiber supplements, vitamins, iron or multivitamins that contain iron.    If you are a diabetic and take medications to control your blood sugar, follow the special instructions listed or talk to your provider.     Arrange transportation with a family member or friend to drive you home and have an adult available to stay with you for the next 4 hours when you arrive home for your safety. If you need to take a taxi or the bus, you MUST have a responsible adult to ride with you OR YOUR PROCEDURE WILL BE CANCELLED. It is recommended that you DO NOT DRIVE for the next 24 hours after receiving anesthesia.       prescriptions at your pharmacy. If it has been more than one week since your appointment was scheduled, please call your pharmacy to verify it is still ready for .     Call the Clinic Nurse or [facility] if you should become ill within 1 week of your procedure and we will reschedule it when you are healthy. This includes sings or symptoms of a cold or the flu. This can include fever, chills, sore throat, cough, chest congestions, productive cough, runny nose.     1 DAY BEFORE THE EXAM:    DO NOT EAT ANY SOLID FOOD OR MILK PRODUCTS AFTER 12:00 AM (MIDNIGHT).  Drink only clear liquids for breakfast, lunch and dinner. (No red or purple colors as these colors can be mistaken for blood.)  Clear liquids include water, juices without pulp, soft drinks, broth, bouillon, black coffee without cream, tea, Jarad-Aid, Gatorade, Jell-O and popsicles. No red or purple colors.  Follow the instructions of your colon preparation.         CLEAR LIQUID DIET    You may have:  Tea, coffee (no cream)  Water, vitamin water, smart water, coconut water, PowerAde, Propel, Soda that is not red or purple (Sprite, 7-up, ginger ale), Gatorade that is not red or purple  Clear nutrition drinks (Resource Breeze, Ensure Active protein drink peach flavor)  Jell-O (not red or purple), Popsicles (without milk or fruit pieces and not red or purple), Telugu ice (not red or purple)  Fat-free broth or bouillon  Plain hard candy such as life savers (not red or purple)  Powdered lemonade such as Crystal Light or Country Time  Clear juices and fruit-flavored drinks such as apple juice, white grape juice, Hi-C and Jarad-Aid (not red or purple)  Honey, sugar    Do not have:  Milk or milk products such as ice cream, malts, or shakes  Red or purple drinks of any kind  Cranberry or grape juice  Red or purple Jell-O, popsicles, Jarad-Aid, Sorber and candy  Orange, grapefruit, pineapple or tomato juices or juices with pulp  Cream soups of any  "kind  Alcohol    BETWEEN 4:00PM and 6:00PM PRIOR TO EXAM:    Drink one 8 ounce glass every 10 minutes until gone. Drink each glass quickly rather than sipping.  If the liquid is too salty, you may use a straw. Drink the whole gallon of Golytely. You must complete the entire gallon as directed.  You will be passing light yellow or clear watery stool.       DAY OF COLONOSCOPY PROCEDURE:     You many have clear liquids up until 3 hours before you check in at admitting.  Wear comfortable clothes. No jewelry, body piercings, make-up, nail polish, hair spray, lotions, perfumes or colognes. Shower before you arrive.  Take blood pressure and heart medications as usual with a sip of water.    You must have a  with you and and adult available to stay with your for 4 hours at home. The medicine used in this test will make you sleepy. If you do not have someone, please reschedule or your test will be cancelled.  It is recommended that you do not drive for 24 hours after your test. Do not operate power equipment, drink alcoholic beverages, make important decisions or sign legal documents.     COLONOSCOPY FREQUENTLY ASKED QUESTIONS    What is a colonoscopy?    A colonoscopy is a test to look at the lining of your large intestine. The purpose of the exam is to check for abnormalities including growths called \"polyps\" that can lead to serious disease. A flexibles scope is inserted into your rectum by the doctor to examine your large intestine.    What are polyps?  Polyps are abnormal growths on the lining of the colon. Most polyps are not cancerous, but some polyps have the potential to turn into cancer with time. Polyps can also bleed. For these reasons, most polyps are removed during a colonoscopy and sent to the laboratory for microscopic examination.    What preparation is needed?  The colon must be completely clean for the procedure to be performed. You may be given one or two different prep solutions to cleanse your " bowel. You will also need to follow a clear liquid diet the day before your procedure.    What happens after the procedure?  After your procedure is complete, you will be taken back to your day surgery room where you will be monitored for approximately 1 hour. You can expect to feel drowsy for several hours afterward. You may experience some cramping or bloating due to the air introduced into your colon during the exam. You will not be able to drive or operate machinery the rest of the day. You will be given written discharge instructions and appropriate learning material before you go home. You must have an adult to stay at home with you for the next 4 hours after you leave the hospital for your safety.    When will I find out the results of my test?  Your surgeon will talk to you and your designated  before you leave and usually the preliminary results can be given to you at that time. If a biopsy was taken during your procedure, it will be sent to the laboratory for examination. Results usually take one week. You will be contacted by phone or by letter with results.      TIPS FOR COLON CLEANSING BEFORE YOUR COLONOSCOPY    To get accurate results from your exam, your colon must be completely clean and empty. Please follow your doctor's instructions. If you do not, you may need to repeat both the exam and colon-cleansing process.    The medicine you take may cause bloating, nausea and other discomfort. Follow these tips to make the process as easy as possible:     You may use alcohol-free baby wipes to ease anal irritation. You may also use Vaseline to help protect the skin. Other options include Tucks wipes, hemorrhoid treatments and hydrocortisone cream.     You may wish to squeeze some lemon juice into your preparation or add a packet of Crystal Light lemon-lime or ice tea flavor. (remember to not use red or purple)    To chill the solution, put it in your refrigerator or set it in a bowl of ice. Do not  add ice in your drinking glass. You may remove the colon preparation from the refrigerator 15-30 minutes before drinking.    Quickly drink one whole glass every 5 to 10 minutes. It may help to use a timer. If the liquid is too salty, use a straw.    Stay near a toilet!    You will have diarrhea (loose watery stools) and may also have chills. Dress for comfort.    Expect to feel discomfort until the stool clears from your colon. This usually takes about 2 to 4 hours.    Even when you are sitting on the toilet, keep drinking a glass of solution every 10-15 minutes.    If you have nausea or vomiting, rinse your mouth with water. Take a break for 15 to 30 minutes, and then keep drinking the solution.    Some people find it helpful to suck on a wedge of lemon or lime. You may also try sucking on hard candy (not red or purple) or washing your mouth out with water, clear soda or mouthwash.    If you followed your doctor's orders and your stool is a clear or yellow liquid, you are ready for the exam.    If you are not sure if your colon is clean, please call your clinic and ask to speak to a nurse.

## 2018-05-10 NOTE — NURSING NOTE
"Chief Complaint   Patient presents with     Consult For     Colonoscopy. referred by Dr. Braga.       Initial /72  Pulse 51  Temp 96.4  F (35.8  C)  Ht 5' 11\" (1.803 m)  Wt 208 lb 6.4 oz (94.5 kg)  SpO2 98%  BMI 29.07 kg/m2 Estimated body mass index is 29.07 kg/(m^2) as calculated from the following:    Height as of this encounter: 5' 11\" (1.803 m).    Weight as of this encounter: 208 lb 6.4 oz (94.5 kg).  Medication Reconciliation: complete       DIANA CLEVELAND LPN    "

## 2018-05-10 NOTE — PROGRESS NOTES
[facility] Surgery Consultation    CC:  Colonoscopy    HPI:  This 63 year old year old male is seen at the request of Jackelyn Braga for evaluation of colonoscopy.  The history is obtained from the patient, and reviewing the medical record.  He is good medical historian.  Mr. Ye states that he is undergone a previous colonoscopy with no evidence of any polyps.  He has not had any abdominal pain, cramping, bloating, constipation, diarrhea.  He has not had any hematochezia or melena.    He does state that he has a mass on the posterior aspect of his scalp and has been slowly growing and is concerned about it.  He has not had any discharge, pain, bleeding from the area.    Past Medical History:   Diagnosis Date     Mild persistent asthma 6/26/2013       Past Surgical History:   Procedure Laterality Date     CIRCUMCISION       COLONOSCOPY  2007    normal     ORTHOPEDIC SURGERY  12/2013    left knee miniscus repair       Pt denied problems with bleeding or anesthesia    Prior to Admission medications    Medication Sig Start Date End Date Taking? Authorizing Provider   Beclomethasone Dipropionate (BECLOVENT IN) Inhale 1-2 puffs into the lungs as needed    Yes Reported, Patient   fluticasone (FLOVENT HFA) 110 MCG/ACT Inhaler Inhale 1 puff into the lungs 2 times daily 4/27/18  Yes Jackelyn Avery MD          Allergies   Allergen Reactions     Seasonal Allergies          HABITS:    Social History   Substance Use Topics     Smoking status: Never Smoker     Smokeless tobacco: Never Used     Alcohol use Yes      Comment: weekends, beer     No mood altering drug use.    Family History   Problem Relation Age of Onset     CANCER Mother 54     lung     HEART DISEASE Father 75     MI     Other Cancer Brother 65     PASSED FROM PROSTATE CANCER     Genitourinary Problems Brother      enlarged prostate       REVIEW OF SYSTEMS:  Ten point review of systems negative except those mentioned in the HPI.     The patient denies  sleep apnea, latex allergies or MRSA    OBJECTIVE:    There were no vitals taken for this visit.    GENERAL: Generally appears well, in no distress with appropriate affect.  HEENT:   Sclerae anicteric - No cervical, supra/infraclavicular lymphadenopathy, no thyroid masses, 1 cm subcutaneous mass on the posterior scalp with no overlying skin erythema.  Respiratory:  Lungs clear to ausculation bilaterally with good air excursion  Cardiovascular:  Regular Rate and Rhythm with no murmurs gallops or rubs, normal   Abdomen: soft, non-tender, non-distended  :  deferred  Extremities:  Extremities normal. No deformities, edema, or skin discoloration.  Skin:  no suspicious lesions or rashes  Neurological: grossly intact    Psych:  Alert, oriented, affect appropriate with normal decision making ability.      IMPRESSION:  63 year old male for colorectal cancer screening  Skin lesion of the scalp    PLAN:  A detailed description of the [place] Preventive Task Force development of colorectal cancer screening was had. I described the pathology of the adenoma to carcinoma progression and its genetic changes that occur. I discussed how with colorectal cancer screening by endoscopic surveillance we are able to identify potential malignancies and remove them before they progress along the adenoma to carcinoma pathway. The risks for colon cancer progression were discussed including first degree relatives with colon cancer, inflammatory bowel disease, smoking, obesity, and diet. I then discussed how there are certain attributes which can decrease the risk of colon cancer such as a healthy diet and physical activity. The patient understood the adenoma to carcinoma sequence, the reasoning for screening at specific intervals, and risk factor modification. I then described the technical portion of the procedure.    The indications, risks, benefits and technical aspects of whole colon colonoscopy were outlined with risks including,  but not limited to, perforation, bleeding and inability to visualize entire colon.  Management of each was reviewed.  The need of mechanical preparation of the colon was reviewed along with the use of monitored anesthetic care.  The patient's questions were asked and answered.  Scheduled first available date.    We will proceed with excision the scalp lesion in the office today. An informed consent was obtained.        David Knedall MD    5/10/2018  9:52 AM    cc:  Jackelyn Braga

## 2018-05-10 NOTE — PROGRESS NOTES
[facility] Surgery Procedure Note    Procedure: Excision of scalp lesion        The patient was prepped and draped sterilely.  The timeout pause was observed during which the patient confirmed his correct identity, side, site and nature of procedure.  Local anesthesia was obtained with infiltration of 2% xylocaine.  A 2 cm incision was made over the posterior scalp lesion.  Dissected through the subcutaneous tissues surrounding the mass, which measured 1 cm in size.  It appeared to be a cyst.  The mass was resected in its entirety.  The cavity was then irrigated and closed in layers.  Layered closure was accomplished with interrupted 3-0 Vicryl in the subcutaneous tissue; the skin was reapproximated with running 5-0 moncryl.  The wound was cleansed and dermabond was then applied.  The patient was observed in the treatment room for 15 minutes following the procedure without sequelae.  Detailed instructions were provided for wound care and followup appointment.    The procedure was well tolerated and the patient left the clinic in good condition.    David Kendall  5/10/2018

## 2018-05-14 LAB — COPATH REPORT: NORMAL

## 2018-06-07 ENCOUNTER — RESULTS ONLY (OUTPATIENT)
Dept: LAB | Age: 63
End: 2018-06-07

## 2018-06-07 ENCOUNTER — OFFICE VISIT (OUTPATIENT)
Dept: ANESTHESIOLOGY | Facility: HOSPITAL | Age: 63
End: 2018-06-07
Attending: SURGERY
Payer: COMMERCIAL

## 2018-06-07 ENCOUNTER — APPOINTMENT (OUTPATIENT)
Dept: LAB | Facility: HOSPITAL | Age: 63
End: 2018-06-07
Attending: SURGERY
Payer: COMMERCIAL

## 2018-06-07 PROCEDURE — 00811 ANES LWR INTST NDSC NOS: CPT | Mod: QZ | Performed by: NURSE ANESTHETIST, CERTIFIED REGISTERED

## 2018-06-07 PROCEDURE — 45380 COLONOSCOPY AND BIOPSY: CPT | Mod: PT | Performed by: SURGERY

## 2018-06-11 LAB — COPATH REPORT: NORMAL

## 2018-07-02 ENCOUNTER — OFFICE VISIT (OUTPATIENT)
Dept: OTOLARYNGOLOGY | Facility: OTHER | Age: 63
End: 2018-07-02
Attending: OTOLARYNGOLOGY
Payer: COMMERCIAL

## 2018-07-02 VITALS
BODY MASS INDEX: 28.84 KG/M2 | OXYGEN SATURATION: 98 % | DIASTOLIC BLOOD PRESSURE: 68 MMHG | TEMPERATURE: 96.8 F | HEART RATE: 58 BPM | WEIGHT: 206 LBS | RESPIRATION RATE: 16 BRPM | HEIGHT: 71 IN | SYSTOLIC BLOOD PRESSURE: 122 MMHG

## 2018-07-02 DIAGNOSIS — L98.9 NON-HEALING SKIN LESION OF NOSE: Primary | ICD-10-CM

## 2018-07-02 DIAGNOSIS — L98.9 SKIN LESION OF FACE: ICD-10-CM

## 2018-07-02 PROCEDURE — 88305 TISSUE EXAM BY PATHOLOGIST: CPT | Mod: TC | Performed by: OTOLARYNGOLOGY

## 2018-07-02 PROCEDURE — 11442 EXC FACE-MM B9+MARG 1.1-2 CM: CPT | Performed by: OTOLARYNGOLOGY

## 2018-07-02 PROCEDURE — 11442 EXC FACE-MM B9+MARG 1.1-2 CM: CPT | Mod: 59 | Performed by: OTOLARYNGOLOGY

## 2018-07-02 PROCEDURE — 99203 OFFICE O/P NEW LOW 30 MIN: CPT | Mod: 25 | Performed by: OTOLARYNGOLOGY

## 2018-07-02 ASSESSMENT — PAIN SCALES - GENERAL: PAINLEVEL: NO PAIN (0)

## 2018-07-02 NOTE — PROGRESS NOTES
"Otolaryngology Consultation    Patient: Jose C Corona  : 1955    Patient presents with:  Consult: Facial/nose lesion non healing, been about 1 year per Dr Kendall    This patient presents today for a nasal and forehead facial skin lesion.  Present over 6 months, changing and growing.  Nasal lesion is not healing.  No known history of carcinoma skin or melanoma.  Excess sun exposure with sun burns throughout life. No immunodeficiency.      Never tobacco user    He has had a recent excision of a scalp epidermal inclusion cyst on May 10 with Dr. Kendall      Current Outpatient Rx   Medication Sig Dispense Refill     Beclomethasone Dipropionate (BECLOVENT IN) Inhale 1-2 puffs into the lungs as needed        fluticasone (FLOVENT HFA) 110 MCG/ACT Inhaler Inhale 1 puff into the lungs 2 times daily 12 g 5     [DISCONTINUED] albuterol (PROAIR HFA, PROVENTIL HFA, VENTOLIN HFA) 108 (90 BASE) MCG/ACT inhaler Inhale 2 puffs into the lungs every 6 hours         Allergies: Seasonal allergies     Past Medical History:   Diagnosis Date     Mild persistent asthma 2013       Past Surgical History:   Procedure Laterality Date     CIRCUMCISION       COLONOSCOPY      normal     COLONOSCOPY  2018    2 small polyps recheck 10 years per patient     ORTHOPEDIC SURGERY  2013    left knee miniscus repair       ENT family history reviewed    Social History   Substance Use Topics     Smoking status: Never Smoker     Smokeless tobacco: Never Used     Alcohol use Yes      Comment: weekends, beer       Review of Systems  ROS: 10 point ROS neg other than the symptoms noted above in the HPI and shortness of breath on exertion, wheezing, sneezing, itchy eyes    Physical Exam  /68  Pulse 58  Temp 96.8  F (36  C) (Tympanic)  Resp 16  Ht 5' 11\" (1.803 m)  Wt 206 lb (93.4 kg)  SpO2 98%  BMI 28.73 kg/m2  General - The patient is well nourished and well developed, and appears to have good nutritional status.  Alert " and oriented to person and place, answers questions and cooperates with examination appropriately.   Skin-right upper forehead raised hypervascular nevi 1.0 cm, just to left of central nasal dorsum there is a superficially ulcerated irregular macule 1.0 cm  Posterior scalp closure well healed  Head and Face - Normocephalic and atraumatic, with no gross asymmetry noted.  The facial nerve is intact, with strong symmetric movements.  Voice and Breathing - The patient was breathing comfortably without the use of accessory muscles. There was no wheezing, stridor, or stertor.  The patients voice was clear and strong, and had appropriate pitch and quality.  No nusrat peripheral digital clubbing or cyanosis   Eyes - Extraocular movements intact, and the pupils were reactive to light.  Sclera were not icteric or injected, conjunctiva were pink and moist.  Neck - Normal midline excursion of the laryngotracheal complex during swallowing.  Full range of motion on passive movement.  Palpation of the occipital, submental, submandibular, internal jugular chain, and supraclavicular nodes did not demonstrate any abnormal lymph nodes or masses.  Palpation of the thyroid was soft and smooth, with no nodules or goiter appreciated.  The trachea was mobile and midline.  Nose - External contour is symmetric, no gross deflection or scars.  Nasal mucosa is pink and moist with no abnormal mucus.   No polyps, masses, or purulence noted on examination.    Office Procedure:   I discussed the risks and complications of skin lesion excision, including local anesthesia, bleeding, infection, injury to major/minor arteries, nerves and veins, scar formation, hypertrophic healing or keloid, numbness to area, recurrence of lesion, benign versus malignant pathology and possible need for further surgery. All questions were answered.    After informed consent was discussed and a time- out taken, I proceeded to cleanse the skin around the lesion with  alcohol.  I then demarcated the lesion parallel to relaxed skin tension lines in a natural crease at the right forehead and left nasal dorsum.  The area was prepped and draped in the normal sterile fashion.  I then infiltrated the skin with 1% lidocaine with 1:200,000 epinephrine.  I  used a 15-blade to create an elliptical incision around the lesion, with margins of 1-2 mm of grossly normal skin.  I then elevated the skin ellipse and a thin cuff of underlying subdermal fat with curved iris scissors.  I dissected down through normal subcutaneous tissue for complete removal of the lesion.  After the lesions were completely removed, I then placed it in formalin for permanent section.  Hemostasis was achieved with direct pressure and hand held cautery. I then irrigated the wound and gently suctioned the area.  I advanced the adjacent skin for tension free closure using tenotomy scissors.  The total length of the incisions were  1.3 cm nose and 1.4 cm forehead.    I then proceeded to close the incision by using simple interrupted buried knot sutures, using 5-0 Vicryl.  The skin edges were then reapproximated using 5-0 nylon, simple interrupted sutures.  Antibiotic ointment was then applied and the wound was dressed.  The patient tolerated the procedure without any difficulty.    A/P  This patient had a skin lesion excised today.  I have instructed the patient on wound care and signs of infection.  Written instructions provided.  We will contact the patient with pathology results.    If any additional surgery needs to be scheduled we will discuss this after pathology results are finalized.     No soaking of the wound for 3 weeks, may shower.     Follow up with LPN in 1 week for suture removal, or as otherwise indicated.      If you prefer, you may remove your own nylon sutures in 1 week per our instructions, followed by careful application of benzoin and steri strips.    If the final pathology reveals a carcinoma, he  will need additional excision with 3 mm clear margins  and outpatient surgery with frozen section possible flap repair this was discussed today.  Photos taken    Sunscreen use and skin cancer preventive measures discussed         Impression and Plan- Jose C Corona is a 63 year old male with:    ICD-10-CM    1. Non-healing skin lesion of nose L98.9    2. Skin lesion of forehead L98.9              Yaneli Reeves D.O.  Otolaryngology/Head and Neck Surgery  Allergy

## 2018-07-02 NOTE — PATIENT INSTRUCTIONS
Thank you for allowing Dr. Reeves and our ENT team to participate in your care.  If your medications are too expensive, please give the nurse a call.  We can possibly change this medication.  If you have a scheduling or an appointment question please contact [facility] Unit Coordinator at their direct line [phone].   ALL nursing questions or concerns can be directed to your ENT nurse at: [phone] - [place]      POST PROCEDURE INSTRUCTIONS      Remove your dressing in 24 hours (If you have one)    Wash incision with a mixture of half water and half hydrogen peroxide 3 times daily.    Apply Aquaphor Healing Ointment to the wound 3 times daily. (Unless specified Bacitracin)    Cover with a clean dressing if in a dirty breonna environment or when wet/soiled    Keep incision clean and dry   Do NOT soak in water such as a tub bath or swimming   Do NOT put make-up, powders, hairspray, lotions, etc on the incision       You can apply ice to the surgical area to help reduce swelling. (no longer than 20 minutes at a time)      You can use acetaminophen(Tylenol) or the prescription you received for pain.       If you have any bleeding, cover the wound with clean gauze and hold pressure for 10 Minutes. If the bleeding does not stop or is heavy and profuse, call the clinic or go to the Urgent Care/Emergency Department.    SIGNS OF INFECTION ARE:    Redness, swelling, red streaks, pus, drainage, warmth, fever, increased pain, foul smell.     Contact your primary health care provider if you notice any of the warning signs.     FOLLOW - UP    Follow-up in clinic for a nurse only visit in 7 days for suture removal.     Pathology results will be called to you when they are back. Usually 7-10 days.      6 WEEKS POST PROCEDURE      Apply ANY type of lotion to the suture site(Example - Vaseline Intensive Care or Vitamin E)    Massage the surgical area 1-2 times daily in a circular motion for 5 minutes, for a period of  2 months. This will help the scar heal better.

## 2018-07-02 NOTE — NURSING NOTE
"Chief Complaint   Patient presents with     Consult     Facial/nose lesion non healing, been about 1 year per Dr Kendall       Initial /68  Pulse 58  Temp 96.8  F (36  C) (Tympanic)  Resp 16  Ht 5' 11\" (1.803 m)  Wt 206 lb (93.4 kg)  SpO2 98%  BMI 28.73 kg/m2 Estimated body mass index is 28.73 kg/(m^2) as calculated from the following:    Height as of this encounter: 5' 11\" (1.803 m).    Weight as of this encounter: 206 lb (93.4 kg).  Medication Reconciliation: complete    Samia Zepeda LPN    "

## 2018-07-02 NOTE — MR AVS SNAPSHOT
After Visit Summary   7/2/2018    Jose C Corona    MRN: [id number]           Patient Information     Date Of Birth          1955        Visit Information        Provider Department      7/2/2018 9:15 AM Yaneli Reeves MD [facility]        Today's Diagnoses     Non-healing skin lesion of nose    -  1    Skin lesion of forehead          Care Instructions    Thank you for allowing Dr. Reeves and our ENT team to participate in your care.  If your medications are too expensive, please give the nurse a call.  We can possibly change this medication.  If you have a scheduling or an appointment question please contact [facility] Unit Coordinator at their direct line [phone].   ALL nursing questions or concerns can be directed to your ENT nurse at: [phone] - [place]      POST PROCEDURE INSTRUCTIONS      Remove your dressing in 24 hours (If you have one)    Wash incision with a mixture of half water and half hydrogen peroxide 3 times daily.    Apply Aquaphor Healing Ointment to the wound 3 times daily. (Unless specified Bacitracin)    Cover with a clean dressing if in a dirty breonna environment or when wet/soiled    Keep incision clean and dry   Do NOT soak in water such as a tub bath or swimming   Do NOT put make-up, powders, hairspray, lotions, etc on the incision       You can apply ice to the surgical area to help reduce swelling. (no longer than 20 minutes at a time)      You can use acetaminophen(Tylenol) or the prescription you received for pain.       If you have any bleeding, cover the wound with clean gauze and hold pressure for 10 Minutes. If the bleeding does not stop or is heavy and profuse, call the clinic or go to the Urgent Care/Emergency Department.    SIGNS OF INFECTION ARE:    Redness, swelling, red streaks, pus, drainage, warmth, fever, increased pain, foul smell.     Contact your primary health care provider if you notice any of the warning  "signs.     FOLLOW - UP    Follow-up in clinic for a nurse only visit in 7 days for suture removal.     Pathology results will be called to you when they are back. Usually 7-10 days.      6 WEEKS POST PROCEDURE      Apply ANY type of lotion to the suture site(Example - Vaseline Intensive Care or Vitamin E)    Massage the surgical area 1-2 times daily in a circular motion for 5 minutes, for a period of 2 months. This will help the scar heal better.             Follow-ups after your visit        Who to contact     If you have questions or need follow up information about today's clinic visit or your schedule please contact [facility] directly at [phone].  Normal or non-critical lab and imaging results will be communicated to you by MyChart, letter or phone within 4 business days after the clinic has received the results. If you do not hear from us within 7 days, please contact the clinic through MyChart or phone. If you have a critical or abnormal lab result, we will notify you by phone as soon as possible.  Submit refill requests through [employer] or call your pharmacy and they will forward the refill request to us. Please allow 3 business days for your refill to be completed.          Additional Information About Your Visit        Care EveryWhere ID     This is your Care EveryWhere ID. This could be used by other organizations to access your [place] medical records  [id number]        Your Vitals Were     Pulse Temperature Respirations Height Pulse Oximetry BMI (Body Mass Index)    58 96.8  F (36  C) (Tympanic) 16 5' 11\" (1.803 m) 98% 28.73 kg/m2       Blood Pressure from Last 3 Encounters:   07/02/18 122/68   05/10/18 128/72   04/27/18 126/72    Weight from Last 3 Encounters:   07/02/18 206 lb (93.4 kg)   05/10/18 208 lb 6.4 oz (94.5 kg)   04/27/18 207 lb (93.9 kg)              Today, you had the following     No orders found for display         Today's Medication Changes          These changes " are accurate as of 7/2/18  9:38 AM.  If you have any questions, ask your nurse or doctor.               Stop taking these medicines if you haven't already. Please contact your care team if you have questions.     polyethylene glycol 236 g suspension   Commonly known as:  GoLYTELY/NuLYTELY   Stopped by:  Yaneli Reeves MD                    Primary Care Provider Office Phone # Fax #    Jackleyn MARIE Avery [phone] [address] [facility] [address]        Equal Access to Services     [facility]: Hadii aad ku hadasho Soomaali, waaxda luqadaha, qaybta kaalmada adeegyada, waxay keisha hayyue ocrdero . So [facility] [phone].    ATENCIÓN: Si habla español, tiene a lyons disposición servicios gratuitos de asistencia lingüística. [facility] [phone].    We comply with applicable federal civil rights laws and [place] laws. We do not discriminate on the basis of race, color, national origin, age, disability, sex, sexual orientation, or gender identity.            Thank you!     Thank you for choosing [facility]  for your care. Our goal is always to provide you with excellent care. Hearing back from our patients is one way we can continue to improve our services. Please take a few minutes to complete the written survey that you may receive in the mail after your visit with us. Thank you!             Your Updated Medication List - Protect others around you: Learn how to safely use, store and throw away your medicines at www.disposemymeds.org.          This list is accurate as of 7/2/18  9:38 AM.  Always use your most recent med list.                   Brand Name Dispense Instructions for use Diagnosis    BECLOVENT IN      Inhale 1-2 puffs into the lungs as needed        fluticasone 110 MCG/ACT Inhaler    FLOVENT HFA    12 g    Inhale 1 puff into the lungs 2 times daily    Mild persistent asthma without complication

## 2018-07-09 ENCOUNTER — ALLIED HEALTH/NURSE VISIT (OUTPATIENT)
Dept: OTOLARYNGOLOGY | Facility: OTHER | Age: 63
End: 2018-07-09
Attending: OTOLARYNGOLOGY
Payer: COMMERCIAL

## 2018-07-09 DIAGNOSIS — Z48.02 ENCOUNTER FOR REMOVAL OF SUTURES: Primary | ICD-10-CM

## 2018-07-09 LAB — COPATH REPORT: NORMAL

## 2018-07-09 NOTE — MR AVS SNAPSHOT
After Visit Summary   7/9/2018    Jose C Corona    MRN: [id number]           Patient Information     Date Of Birth          1955        Visit Information        Provider Department      7/9/2018 8:00 AM HC ENT NURSE [facility]        Today's Diagnoses     Encounter for removal of sutures    -  1       Follow-ups after your visit        Who to contact     If you have questions or need follow up information about today's clinic visit or your schedule please contact [facility] directly at [phone].  Normal or non-critical lab and imaging results will be communicated to you by MyChart, letter or phone within 4 business days after the clinic has received the results. If you do not hear from us within 7 days, please contact the clinic through MyChart or phone. If you have a critical or abnormal lab result, we will notify you by phone as soon as possible.  Submit refill requests through [employer] or call your pharmacy and they will forward the refill request to us. Please allow 3 business days for your refill to be completed.          Additional Information About Your Visit        Care EveryWhere ID     This is your Care EveryWhere ID. This could be used by other organizations to access your [place] medical records  [id number]         Blood Pressure from Last 3 Encounters:   07/02/18 122/68   05/10/18 128/72   04/27/18 126/72    Weight from Last 3 Encounters:   07/02/18 206 lb (93.4 kg)   05/10/18 208 lb 6.4 oz (94.5 kg)   04/27/18 207 lb (93.9 kg)              Today, you had the following     No orders found for display       Primary Care Provider Office Phone # Fax #    Jackelyn Avery [phone] [facility] [address]        Equal Access to Services     CELIA CHÁVEZ AH: Claudia De La Torre, kate moore, yamilet bowen. So [facility]  [phone].    ATENCIÓN: Si margarettela jaciel, tiene a lyons disposición servicios gratuitos de asistencia lingüística. Raul al [phone].    We comply with applicable federal civil rights laws and [place] laws. We do not discriminate on the basis of race, color, national origin, age, disability, sex, sexual orientation, or gender identity.            Thank you!     Thank you for choosing [facility]  for your care. Our goal is always to provide you with excellent care. Hearing back from our patients is one way we can continue to improve our services. Please take a few minutes to complete the written survey that you may receive in the mail after your visit with us. Thank you!             Your Updated Medication List - Protect others around you: Learn how to safely use, store and throw away your medicines at www.disposemymeds.org.          This list is accurate as of 7/9/18  8:17 AM.  Always use your most recent med list.                   Brand Name Dispense Instructions for use Diagnosis    BECLOVENT IN      Inhale 1-2 puffs into the lungs as needed        fluticasone 110 MCG/ACT Inhaler    FLOVENT HFA    12 g    Inhale 1 puff into the lungs 2 times daily    Mild persistent asthma without complication

## 2018-07-09 NOTE — PROGRESS NOTES
This patient presents today to get his suture removed. He had a 2 lesions removed 1 week ago by Dr. Reeves. Both areas appear clean and intact. All sutures were removed without difficulty. Final pathology is still pending. He will continue wound care instructions at home.

## 2018-07-10 ENCOUNTER — TELEPHONE (OUTPATIENT)
Dept: OTOLARYNGOLOGY | Facility: OTHER | Age: 63
End: 2018-07-10

## 2018-07-10 DIAGNOSIS — C44.310 BCC (BASAL CELL CARCINOMA), FACE: Primary | ICD-10-CM

## 2018-08-03 ENCOUNTER — OFFICE VISIT (OUTPATIENT)
Dept: FAMILY MEDICINE | Facility: OTHER | Age: 63
End: 2018-08-03
Attending: FAMILY MEDICINE
Payer: COMMERCIAL

## 2018-08-03 VITALS
RESPIRATION RATE: 14 BRPM | DIASTOLIC BLOOD PRESSURE: 80 MMHG | BODY MASS INDEX: 28.84 KG/M2 | HEIGHT: 71 IN | SYSTOLIC BLOOD PRESSURE: 120 MMHG | HEART RATE: 68 BPM | WEIGHT: 206 LBS | TEMPERATURE: 97.1 F

## 2018-08-03 DIAGNOSIS — Z01.818 PREOP GENERAL PHYSICAL EXAM: Primary | ICD-10-CM

## 2018-08-03 DIAGNOSIS — C44.310 BCC (BASAL CELL CARCINOMA), FACE: ICD-10-CM

## 2018-08-03 LAB
ERYTHROCYTE [DISTWIDTH] IN BLOOD BY AUTOMATED COUNT: 12.6 % (ref 10–15)
HCT VFR BLD AUTO: 43 % (ref 40–53)
HGB BLD-MCNC: 15 G/DL (ref 13.3–17.7)
MCH RBC QN AUTO: 31.8 PG (ref 26.5–33)
MCHC RBC AUTO-ENTMCNC: 34.9 G/DL (ref 31.5–36.5)
MCV RBC AUTO: 91 FL (ref 78–100)
PLATELET # BLD AUTO: 258 10E9/L (ref 150–450)
RBC # BLD AUTO: 4.72 10E12/L (ref 4.4–5.9)
WBC # BLD AUTO: 7.8 10E9/L (ref 4–11)

## 2018-08-03 PROCEDURE — 93000 ELECTROCARDIOGRAM COMPLETE: CPT | Performed by: INTERNAL MEDICINE

## 2018-08-03 PROCEDURE — 85027 COMPLETE CBC AUTOMATED: CPT | Performed by: FAMILY MEDICINE

## 2018-08-03 PROCEDURE — 99214 OFFICE O/P EST MOD 30 MIN: CPT | Mod: 25 | Performed by: FAMILY MEDICINE

## 2018-08-03 PROCEDURE — 36415 COLL VENOUS BLD VENIPUNCTURE: CPT | Performed by: FAMILY MEDICINE

## 2018-08-03 ASSESSMENT — ANXIETY QUESTIONNAIRES
2. NOT BEING ABLE TO STOP OR CONTROL WORRYING: NOT AT ALL
6. BECOMING EASILY ANNOYED OR IRRITABLE: NOT AT ALL
3. WORRYING TOO MUCH ABOUT DIFFERENT THINGS: NOT AT ALL
1. FEELING NERVOUS, ANXIOUS, OR ON EDGE: NOT AT ALL
5. BEING SO RESTLESS THAT IT IS HARD TO SIT STILL: NOT AT ALL
7. FEELING AFRAID AS IF SOMETHING AWFUL MIGHT HAPPEN: NOT AT ALL
GAD7 TOTAL SCORE: 0

## 2018-08-03 ASSESSMENT — PATIENT HEALTH QUESTIONNAIRE - PHQ9: 5. POOR APPETITE OR OVEREATING: NOT AT ALL

## 2018-08-03 ASSESSMENT — PAIN SCALES - GENERAL: PAINLEVEL: NO PAIN (0)

## 2018-08-03 NOTE — NURSING NOTE
"Chief Complaint   Patient presents with     Pre-Op Exam     Other     Would like to go over colonoscopy results.       Initial /80 (BP Location: Left arm, Patient Position: Sitting, Cuff Size: Adult Large)  Pulse 68  Temp 97.1  F (36.2  C) (Tympanic)  Resp 14  Ht 5' 11\" (1.803 m)  Wt 206 lb (93.4 kg)  BMI 28.73 kg/m2 Estimated body mass index is 28.73 kg/(m^2) as calculated from the following:    Height as of this encounter: 5' 11\" (1.803 m).    Weight as of this encounter: 206 lb (93.4 kg).  Medication Reconciliation: complete    Elsa Serrano LPN    "

## 2018-08-03 NOTE — MR AVS SNAPSHOT
After Visit Summary   8/3/2018    Jose C Corona    MRN: [id number]           Patient Information     Date Of Birth          1955        Visit Information        Provider Department      8/3/2018 1:30 PM Jackelyn Avery MD [facility] [place]        Today's Diagnoses     Preop general physical exam    -  1    BCC (basal cell carcinoma), face          Care Instructions      Before Your Surgery      Call your surgeon if there is any change in your health. This includes signs of a cold or flu (such as a sore throat, runny nose, cough, rash or fever).    Do not smoke, drink alcohol or take over the counter medicine (unless your surgeon or primary care doctor tells you to) for the 24 hours before and after surgery.    If you take prescribed drugs: Follow your doctor s orders about which medicines to take and which to stop until after surgery.    Eating and drinking prior to surgery: follow the instructions from your surgeon    Take a shower or bath the night before surgery. Use the soap your surgeon gave you to gently clean your skin. If you do not have soap from your surgeon, use your regular soap. Do not shave or scrub the surgery site.  Wear clean pajamas and have clean sheets on your bed.           Follow-ups after your visit        Follow-up notes from your care team     Return if symptoms worsen or fail to improve.      Your next 10 appointments already scheduled     Aug 08, 2018   Procedure with Yaneli Reeves MD   [facility] ([facility] )    [address] [place] [address]   [phone]            Aug 15, 2018  8:45 AM CDT   (Arrive by 8:30 AM)   Post Op with YAKELIN [facility]    [address] [facility] [address]   [phone]            Nov 13, 2018  8:45 AM CST   (Arrive by 8:30 AM)   New Visit with DAVIDSON Roberson MD   [facility] ([facility] [place] )    [address] [place] [address] [phone]              Who to contact     If you have questions or need follow up information about today's clinic visit or your schedule please contact [facility] directly at [phone].  Normal or non-critical lab and imaging results will be communicated to you by MyChart, letter or phone within 4 business days after the clinic has received the results. If you do not hear from us within 7 days, please contact the clinic through MyChart or phone. If you have a critical or abnormal lab result, we will notify you by phone as soon as possible.  Submit refill requests through [employer] or call your pharmacy and they will forward the refill request to us. Please allow 3 business days for your refill to be completed.          Additional Information About Your Visit        Care EveryWhere ID     This is your Care EveryWhere ID. This could be used by other organizations to access your [place] medical records  [id number]        Your Vitals Were     Pulse Temperature Respirations Height BMI (Body Mass Index)       68 97.1  F (36.2  C) (Tympanic) 14 5' 11\" (1.803 m) 28.73 kg/m2        Blood Pressure from Last 3 Encounters:   08/03/18 120/80   07/02/18 122/68   05/10/18 128/72    Weight from Last 3 Encounters:   08/03/18 206 lb (93.4 kg)   07/02/18 206 lb (93.4 kg)   05/10/18 208 lb 6.4 oz (94.5 kg)              We Performed the Following     CBC with platelets     EKG 12-lead complete w/read - (Clinic Performed)        Primary Care Provider Office Phone # Fax #    Jackelyn Avery [phone] [address] [facility] [address]        Equal Access to Services     CELIA CHÁVEZ : Claudia De La Torre, kate moore, ping kaalmada valerie, yamilet blas. So [facility] [phone].    ATENCIÓN: Si habla español, tiene a lyons disposición servicios gratuitos de asistencia " lingüística. Raul al [phone].    We comply with applicable federal civil rights laws and [place] laws. We do not discriminate on the basis of race, color, national origin, age, disability, sex, sexual orientation, or gender identity.            Thank you!     Thank you for choosing [facility]  for your care. Our goal is always to provide you with excellent care. Hearing back from our patients is one way we can continue to improve our services. Please take a few minutes to complete the written survey that you may receive in the mail after your visit with us. Thank you!             Your Updated Medication List - Protect others around you: Learn how to safely use, store and throw away your medicines at www.disposemymeds.org.          This list is accurate as of 8/3/18  4:58 PM.  Always use your most recent med list.                   Brand Name Dispense Instructions for use Diagnosis    BECLOVENT IN      Inhale 1-2 puffs into the lungs as needed        fluticasone 110 MCG/ACT Inhaler    FLOVENT HFA    12 g    Inhale 1 puff into the lungs 2 times daily    Mild persistent asthma without complication

## 2018-08-03 NOTE — PROGRESS NOTES
[facility]  [address]  [facility] [address]  [phone]  Dept: [phone]    PRE-OP EVALUATION:  Today's date: 8/3/2018    Jose C Corona (: 1955) presents for pre-operative evaluation assessment as requested by Dr. Reeves.  He requires evaluation and anesthesia risk assessment prior to undergoing surgery/procedure for treatment of Nasal Basal Cell Carcinoma .    Proposed Surgery/ Procedure: Excision left nasal basal cell carcinoma with frozens and flap repair  Date of Surgery/ Procedure: 18  Time of Surgery/ Procedure: to be determined  Hospital/Surgical Facility: [facility]    Primary Physician: Jackelyn Avery  Type of Anesthesia Anticipated: to be determined    Patient has a Health Care Directive or Living Will:  YES     1. NO - Do you have a history of heart attack, stroke, stent, bypass or surgery on an artery in the head, neck, heart or legs?  2. NO - Do you ever have any pain or discomfort in your chest?  3. NO - Do you have a history of  Heart Failure?  4. NO - Are you troubled by shortness of breath when: walking on the level, up a slight hill or at night?  5. NO - Do you currently have a cold, bronchitis or other respiratory infection?  6. NO - Do you have a cough, shortness of breath or wheezing?  7. NO - Do you sometimes get pains in the calves of your legs when you walk?  8. NO - Do you or anyone in your family have previous history of blood clots?  9. NO - Do you or does anyone in your family have a serious bleeding problem such as prolonged bleeding following surgeries or cuts?  10. NO - Have you ever had problems with anemia or been told to take iron pills?  11. NO - Have you had any abnormal blood loss such as black, tarry or bloody stools, or abnormal vaginal bleeding?  12. NO - Have you ever had a blood transfusion?  13. NO - Have you or any of your relatives ever had problems with anesthesia?  14. NO - Do you have  sleep apnea, excessive snoring or daytime drowsiness?  15. NO - Do you have any prosthetic heart valves?  16. NO - Do you have prosthetic joints?  17. NO - Is there any chance that you may be pregnant?      HPI:     HPI related to upcoming procedure: Basal cell carcinoma on nose      See problem list for active medical problems.  Problems all longstanding and stable, except as noted/documented.  See ROS for pertinent symptoms related to these conditions.                                                                                                                                                          .    MEDICAL HISTORY:     Patient Active Problem List    Diagnosis Date Noted     ACP (advance care planning) 09/02/2016     Priority: Medium     Advance Care Planning 11/11/2016: Receipt of ACP document:  Received: Health Care Directive which was witnessed or notarized on 8-12-16.  Document previously scanned on 9-7-16.  Validation form completed and sent to be scanned.  Code Status reflects choices in most recent ACP document.  Confirmed/documented designated decision maker(s).  Added by Leann Diego RN Advance Care Planning Liaison with Carlito Mcgee  Advance Care Planning 9/14/2016: ACP Review of Chart / Resources Provided:  Reviewed chart for advance care plan.  Jose C Corona has an up to date advance care plan on file.  Added by Alyssa Gill  Advance Care Planning 9/2/2016: ACP Review of Chart / Resources Provided:  Reviewed chart for advance care plan.  Jose C Corona has been provided information and resources to begin or update their advance care plan.  Added by Alyssa Gill             Mild persistent asthma 06/26/2013     Priority: Medium     Asthma action plan:  6/26/13        Past Medical History:   Diagnosis Date     Mild persistent asthma 6/26/2013     Past Surgical History:   Procedure Laterality Date     CIRCUMCISION       COLONOSCOPY  2007    normal     COLONOSCOPY  06/2018    2 small  "polyps recheck 10 years per patient     ORTHOPEDIC SURGERY  12/2013    left knee meniscus repair     Current Outpatient Prescriptions   Medication Sig Dispense Refill     Beclomethasone Dipropionate (BECLOVENT IN) Inhale 1-2 puffs into the lungs as needed        fluticasone (FLOVENT HFA) 110 MCG/ACT Inhaler Inhale 1 puff into the lungs 2 times daily 12 g 5     [DISCONTINUED] albuterol (PROAIR HFA, PROVENTIL HFA, VENTOLIN HFA) 108 (90 BASE) MCG/ACT inhaler Inhale 2 puffs into the lungs every 6 hours       OTC products: None, except as noted above    Allergies   Allergen Reactions     Seasonal Allergies       Latex Allergy: NO    Social History   Substance Use Topics     Smoking status: Never Smoker     Smokeless tobacco: Never Used     Alcohol use Yes      Comment: weekends, beer     History   Drug Use No       REVIEW OF SYSTEMS:   Constitutional, neuro, ENT, endocrine, pulmonary, cardiac, gastrointestinal, genitourinary, musculoskeletal, integument and psychiatric systems are negative, except as otherwise noted.    EXAM:   /80 (BP Location: Left arm, Patient Position: Sitting, Cuff Size: Adult Large)  Pulse 68  Temp 97.1  F (36.2  C) (Tympanic)  Resp 14  Ht 5' 11\" (1.803 m)  Wt 206 lb (93.4 kg)  BMI 28.73 kg/m2    GENERAL APPEARANCE: healthy, alert and no distress     EYES: EOMI,  PERRL     HENT: ear canals and TM's normal and nose and mouth without ulcers or lesions     NECK: no adenopathy     RESP: lungs clear to auscultation - no rales, rhonchi or wheezes     CV: regular rates and rhythm, normal S1 S2, no S3 or S4 and no murmur, click or rub     ABDOMEN:  soft, nontender, no HSM or masses and bowel sounds normal     SKIN: no suspicious lesions or rashes     NEURO: Normal strength and tone, sensory exam grossly normal, mentation intact and speech normal     PSYCH: mentation appears normal. and affect normal/bright    DIAGNOSTICS:     EKG: appears normal, NSR, normal axis, normal intervals, no acute " ST/T changes c/w ischemia, no LVH by voltage criteria, unchanged from previous tracings    Labs Resulted Today:   Results for orders placed or performed in visit on 08/03/18   CBC with platelets   Result Value Ref Range    WBC 7.8 4.0 - 11.0 10e9/L    RBC Count 4.72 4.4 - 5.9 10e12/L    Hemoglobin 15.0 13.3 - 17.7 g/dL    Hematocrit 43.0 40.0 - 53.0 %    MCV 91 78 - 100 fl    MCH 31.8 26.5 - 33.0 pg    MCHC 34.9 31.5 - 36.5 g/dL    RDW 12.6 10.0 - 15.0 %    Platelet Count 258 150 - 450 10e9/L       Recent Labs   Lab Test  09/01/16   1515  12/05/13   1401   HGB  14.8  14.3   PLT  289  261   INR   --   1.0   NA  140  139   POTASSIUM  4.6  4.0   CR  0.98  1.10        IMPRESSION:   Reason for surgery/procedure: BCC on nose  Diagnosis/reason for consult: Cardiopulmonary clearance    The proposed surgical procedure is considered INTERMEDIATE risk.    REVISED CARDIAC RISK INDEX  The patient has the following serious cardiovascular risks for perioperative complications such as (MI, PE, VFib and 3  AV Block):  No serious cardiac risks  INTERPRETATION: 0 risks: Class I (very low risk - 0.4% complication rate)    The patient has the following additional risks for perioperative complications:  No identified additional risks      ICD-10-CM    1. Preop general physical exam Z01.818 CBC with platelets     EKG 12-lead complete w/read - (Clinic Performed)   2. BCC (basal cell carcinoma), face C44.310        RECOMMENDATIONS:       Cardiovascular Risk  Performs 4 METs exercise without symptoms (Light housework (dusting, washing dishes), Climb a flight of stairs and Walk on level ground at 15 minutes per mile (4 miles/hour)) .       --Patient is to take all scheduled medications on the day of surgery EXCEPT for modifications listed below.    Anticoagulant or Antiplatelet Medication Use  Hold all ASA/NSAIDs/Vitamins/Supplements 7-10 days prior to procedure        APPROVAL GIVEN to proceed with proposed procedure, without further  diagnostic evaluation       Signed Electronically by: Jackelyn Avery MD    Copy of this evaluation report is provided to requesting physician.    [facility] Preop Guidelines    Revised Cardiac Risk Index

## 2018-08-04 ASSESSMENT — PATIENT HEALTH QUESTIONNAIRE - PHQ9: SUM OF ALL RESPONSES TO PHQ QUESTIONS 1-9: 0

## 2018-08-04 ASSESSMENT — ANXIETY QUESTIONNAIRES: GAD7 TOTAL SCORE: 0

## 2018-08-06 ENCOUNTER — ANESTHESIA EVENT (OUTPATIENT)
Dept: SURGERY | Facility: HOSPITAL | Age: 63
End: 2018-08-06
Payer: COMMERCIAL

## 2018-08-06 NOTE — H&P (VIEW-ONLY)
[facility]  [address]  [facility] [address]  [phone]  Dept: [phone]    PRE-OP EVALUATION:  Today's date: 8/3/2018    Jose C Corona (: 1955) presents for pre-operative evaluation assessment as requested by Dr. Reeves.  He requires evaluation and anesthesia risk assessment prior to undergoing surgery/procedure for treatment of Nasal Basal Cell Carcinoma .    Proposed Surgery/ Procedure: Excision left nasal basal cell carcinoma with frozens and flap repair  Date of Surgery/ Procedure: 18  Time of Surgery/ Procedure: to be determined  Hospital/Surgical Facility: [facility]    Primary Physician: Jackelyn Avery  Type of Anesthesia Anticipated: to be determined    Patient has a Health Care Directive or Living Will:  YES     1. NO - Do you have a history of heart attack, stroke, stent, bypass or surgery on an artery in the head, neck, heart or legs?  2. NO - Do you ever have any pain or discomfort in your chest?  3. NO - Do you have a history of  Heart Failure?  4. NO - Are you troubled by shortness of breath when: walking on the level, up a slight hill or at night?  5. NO - Do you currently have a cold, bronchitis or other respiratory infection?  6. NO - Do you have a cough, shortness of breath or wheezing?  7. NO - Do you sometimes get pains in the calves of your legs when you walk?  8. NO - Do you or anyone in your family have previous history of blood clots?  9. NO - Do you or does anyone in your family have a serious bleeding problem such as prolonged bleeding following surgeries or cuts?  10. NO - Have you ever had problems with anemia or been told to take iron pills?  11. NO - Have you had any abnormal blood loss such as black, tarry or bloody stools, or abnormal vaginal bleeding?  12. NO - Have you ever had a blood transfusion?  13. NO - Have you or any of your relatives ever had problems with anesthesia?  14. NO - Do you have  sleep apnea, excessive snoring or daytime drowsiness?  15. NO - Do you have any prosthetic heart valves?  16. NO - Do you have prosthetic joints?  17. NO - Is there any chance that you may be pregnant?      HPI:     HPI related to upcoming procedure: Basal cell carcinoma on nose      See problem list for active medical problems.  Problems all longstanding and stable, except as noted/documented.  See ROS for pertinent symptoms related to these conditions.                                                                                                                                                          .    MEDICAL HISTORY:     Patient Active Problem List    Diagnosis Date Noted     ACP (advance care planning) 09/02/2016     Priority: Medium     Advance Care Planning 11/11/2016: Receipt of ACP document:  Received: Health Care Directive which was witnessed or notarized on 8-12-16.  Document previously scanned on 9-7-16.  Validation form completed and sent to be scanned.  Code Status reflects choices in most recent ACP document.  Confirmed/documented designated decision maker(s).  Added by Leann Diego RN Advance Care Planning Liaison with Carlito Mcgee  Advance Care Planning 9/14/2016: ACP Review of Chart / Resources Provided:  Reviewed chart for advance care plan.  Jose C Corona has an up to date advance care plan on file.  Added by Alyssa Gill  Advance Care Planning 9/2/2016: ACP Review of Chart / Resources Provided:  Reviewed chart for advance care plan.  Jose C Corona has been provided information and resources to begin or update their advance care plan.  Added by Alyssa Gill             Mild persistent asthma 06/26/2013     Priority: Medium     Asthma action plan:  6/26/13        Past Medical History:   Diagnosis Date     Mild persistent asthma 6/26/2013     Past Surgical History:   Procedure Laterality Date     CIRCUMCISION       COLONOSCOPY  2007    normal     COLONOSCOPY  06/2018    2 small  "polyps recheck 10 years per patient     ORTHOPEDIC SURGERY  12/2013    left knee meniscus repair     Current Outpatient Prescriptions   Medication Sig Dispense Refill     Beclomethasone Dipropionate (BECLOVENT IN) Inhale 1-2 puffs into the lungs as needed        fluticasone (FLOVENT HFA) 110 MCG/ACT Inhaler Inhale 1 puff into the lungs 2 times daily 12 g 5     [DISCONTINUED] albuterol (PROAIR HFA, PROVENTIL HFA, VENTOLIN HFA) 108 (90 BASE) MCG/ACT inhaler Inhale 2 puffs into the lungs every 6 hours       OTC products: None, except as noted above    Allergies   Allergen Reactions     Seasonal Allergies       Latex Allergy: NO    Social History   Substance Use Topics     Smoking status: Never Smoker     Smokeless tobacco: Never Used     Alcohol use Yes      Comment: weekends, beer     History   Drug Use No       REVIEW OF SYSTEMS:   Constitutional, neuro, ENT, endocrine, pulmonary, cardiac, gastrointestinal, genitourinary, musculoskeletal, integument and psychiatric systems are negative, except as otherwise noted.    EXAM:   /80 (BP Location: Left arm, Patient Position: Sitting, Cuff Size: Adult Large)  Pulse 68  Temp 97.1  F (36.2  C) (Tympanic)  Resp 14  Ht 5' 11\" (1.803 m)  Wt 206 lb (93.4 kg)  BMI 28.73 kg/m2    GENERAL APPEARANCE: healthy, alert and no distress     EYES: EOMI,  PERRL     HENT: ear canals and TM's normal and nose and mouth without ulcers or lesions     NECK: no adenopathy     RESP: lungs clear to auscultation - no rales, rhonchi or wheezes     CV: regular rates and rhythm, normal S1 S2, no S3 or S4 and no murmur, click or rub     ABDOMEN:  soft, nontender, no HSM or masses and bowel sounds normal     SKIN: no suspicious lesions or rashes     NEURO: Normal strength and tone, sensory exam grossly normal, mentation intact and speech normal     PSYCH: mentation appears normal. and affect normal/bright    DIAGNOSTICS:     EKG: appears normal, NSR, normal axis, normal intervals, no acute " ST/T changes c/w ischemia, no LVH by voltage criteria, unchanged from previous tracings    Labs Resulted Today:   Results for orders placed or performed in visit on 08/03/18   CBC with platelets   Result Value Ref Range    WBC 7.8 4.0 - 11.0 10e9/L    RBC Count 4.72 4.4 - 5.9 10e12/L    Hemoglobin 15.0 13.3 - 17.7 g/dL    Hematocrit 43.0 40.0 - 53.0 %    MCV 91 78 - 100 fl    MCH 31.8 26.5 - 33.0 pg    MCHC 34.9 31.5 - 36.5 g/dL    RDW 12.6 10.0 - 15.0 %    Platelet Count 258 150 - 450 10e9/L       Recent Labs   Lab Test  09/01/16   1515  12/05/13   1401   HGB  14.8  14.3   PLT  289  261   INR   --   1.0   NA  140  139   POTASSIUM  4.6  4.0   CR  0.98  1.10        IMPRESSION:   Reason for surgery/procedure: BCC on nose  Diagnosis/reason for consult: Cardiopulmonary clearance    The proposed surgical procedure is considered INTERMEDIATE risk.    REVISED CARDIAC RISK INDEX  The patient has the following serious cardiovascular risks for perioperative complications such as (MI, PE, VFib and 3  AV Block):  No serious cardiac risks  INTERPRETATION: 0 risks: Class I (very low risk - 0.4% complication rate)    The patient has the following additional risks for perioperative complications:  No identified additional risks      ICD-10-CM    1. Preop general physical exam Z01.818 CBC with platelets     EKG 12-lead complete w/read - (Clinic Performed)   2. BCC (basal cell carcinoma), face C44.310        RECOMMENDATIONS:       Cardiovascular Risk  Performs 4 METs exercise without symptoms (Light housework (dusting, washing dishes), Climb a flight of stairs and Walk on level ground at 15 minutes per mile (4 miles/hour)) .       --Patient is to take all scheduled medications on the day of surgery EXCEPT for modifications listed below.    Anticoagulant or Antiplatelet Medication Use  Hold all ASA/NSAIDs/Vitamins/Supplements 7-10 days prior to procedure        APPROVAL GIVEN to proceed with proposed procedure, without further  diagnostic evaluation       Signed Electronically by: Jackelyn Avery MD    Copy of this evaluation report is provided to requesting physician.    [facility] Preop Guidelines    Revised Cardiac Risk Index

## 2018-08-06 NOTE — ANESTHESIA PREPROCEDURE EVALUATION
Anesthesia Evaluation     . Pt has had prior anesthetic.     No history of anesthetic complications          ROS/MED HX    ENT/Pulmonary:     (+)Mild Persistent asthma , . .    Neurologic:  - neg neurologic ROS     Cardiovascular:  - neg cardiovascular ROS       METS/Exercise Tolerance:     Hematologic:  - neg hematologic  ROS       Musculoskeletal:   (+) arthritis, , , -       GI/Hepatic:  - neg GI/hepatic ROS       Renal/Genitourinary:  - ROS Renal section negative       Endo:  - neg endo ROS       Psychiatric:  - neg psychiatric ROS       Infectious Disease:  - neg infectious disease ROS       Malignancy:   (+) Malignancy History of Skin  Basal cell CA        Other:    - neg other ROS                 Physical Exam  Normal systems: dental    Airway   Mallampati: III  TM distance: >3 FB  Neck ROM: full    Dental     Cardiovascular   Rhythm and rate: regular and normal      Pulmonary    breath sounds clear to auscultation                    Anesthesia Plan      History & Physical Review  History and physical reviewed and following examination; no interval change.    ASA Status:  2 .    NPO Status:  > 8 hours    Plan for General and LMA with Intravenous and Propofol induction. Maintenance will be Balanced.    PONV prophylaxis:  Ondansetron (or other 5HT-3), Dexamethasone or Solumedrol and Scopolamine patch       Postoperative Care  Postoperative pain management:  IV analgesics and Oral pain medications.      Consents  Anesthetic plan, risks, benefits and alternatives discussed with:  Patient..                          .

## 2018-08-08 ENCOUNTER — ANESTHESIA (OUTPATIENT)
Dept: SURGERY | Facility: HOSPITAL | Age: 63
End: 2018-08-08
Payer: COMMERCIAL

## 2018-08-22 ENCOUNTER — HOSPITAL ENCOUNTER (OUTPATIENT)
Facility: HOSPITAL | Age: 63
Discharge: HOME | End: 2018-08-22
Attending: OTOLARYNGOLOGY | Admitting: OTOLARYNGOLOGY
Payer: COMMERCIAL

## 2018-08-22 ENCOUNTER — SURGERY (OUTPATIENT)
Age: 63
End: 2018-08-22

## 2018-08-22 VITALS
RESPIRATION RATE: 16 BRPM | BODY MASS INDEX: 29.26 KG/M2 | SYSTOLIC BLOOD PRESSURE: 151 MMHG | HEIGHT: 71 IN | OXYGEN SATURATION: 99 % | TEMPERATURE: 97.6 F | WEIGHT: 209 LBS | DIASTOLIC BLOOD PRESSURE: 90 MMHG

## 2018-08-22 DIAGNOSIS — H10.13 ALLERGIC CONJUNCTIVITIS AND RHINITIS, BILATERAL: Primary | ICD-10-CM

## 2018-08-22 DIAGNOSIS — Z98.890 POST-OPERATIVE STATE: ICD-10-CM

## 2018-08-22 DIAGNOSIS — J30.9 ALLERGIC CONJUNCTIVITIS AND RHINITIS, BILATERAL: Primary | ICD-10-CM

## 2018-08-22 PROCEDURE — 25000125 ZZHC RX 250: Performed by: OTOLARYNGOLOGY

## 2018-08-22 PROCEDURE — 27210794 ZZH OR GENERAL SUPPLY STERILE: Performed by: OTOLARYNGOLOGY

## 2018-08-22 PROCEDURE — 37000008 ZZH ANESTHESIA TECHNICAL FEE, 1ST 30 MIN: Performed by: OTOLARYNGOLOGY

## 2018-08-22 PROCEDURE — 01999 UNLISTED ANES PROCEDURE: CPT | Performed by: NURSE ANESTHETIST, CERTIFIED REGISTERED

## 2018-08-22 PROCEDURE — 88305 TISSUE EXAM BY PATHOLOGIST: CPT | Mod: TC | Performed by: OTOLARYNGOLOGY

## 2018-08-22 PROCEDURE — 25000125 ZZHC RX 250: Performed by: NURSE ANESTHETIST, CERTIFIED REGISTERED

## 2018-08-22 PROCEDURE — 25000128 H RX IP 250 OP 636: Performed by: ANESTHESIOLOGY

## 2018-08-22 PROCEDURE — 25000132 ZZH RX MED GY IP 250 OP 250 PS 637: Performed by: NURSE ANESTHETIST, CERTIFIED REGISTERED

## 2018-08-22 PROCEDURE — 14040 TIS TRNFR F/C/C/M/N/A/G/H/F: CPT | Performed by: OTOLARYNGOLOGY

## 2018-08-22 PROCEDURE — 71000027 ZZH RECOVERY PHASE 2 EACH 15 MINS: Performed by: OTOLARYNGOLOGY

## 2018-08-22 PROCEDURE — 11440 EXC FACE-MM B9+MARG 0.5 CM/<: CPT | Mod: 59 | Performed by: OTOLARYNGOLOGY

## 2018-08-22 PROCEDURE — 25000128 H RX IP 250 OP 636: Performed by: NURSE ANESTHETIST, CERTIFIED REGISTERED

## 2018-08-22 PROCEDURE — 27110028 ZZH OR GENERAL SUPPLY NON-STERILE: Performed by: OTOLARYNGOLOGY

## 2018-08-22 PROCEDURE — 40000306 ZZH STATISTIC PRE PROC ASSESS II: Performed by: OTOLARYNGOLOGY

## 2018-08-22 PROCEDURE — 88331 PATH CONSLTJ SURG 1 BLK 1SPC: CPT | Mod: TC,59 | Performed by: OTOLARYNGOLOGY

## 2018-08-22 PROCEDURE — 25000125 ZZHC RX 250: Performed by: ANESTHESIOLOGY

## 2018-08-22 PROCEDURE — 36000050 ZZH SURGERY LEVEL 2 1ST 30 MIN: Performed by: OTOLARYNGOLOGY

## 2018-08-22 PROCEDURE — 37000009 ZZH ANESTHESIA TECHNICAL FEE, EACH ADDTL 15 MIN: Performed by: OTOLARYNGOLOGY

## 2018-08-22 PROCEDURE — 14040 TIS TRNFR F/C/C/M/N/A/G/H/F: CPT | Performed by: ANESTHESIOLOGY

## 2018-08-22 PROCEDURE — 36000052 ZZH SURGERY LEVEL 2 EA 15 ADDTL MIN: Performed by: OTOLARYNGOLOGY

## 2018-08-22 RX ORDER — NALOXONE HYDROCHLORIDE 0.4 MG/ML
.1-.4 INJECTION, SOLUTION INTRAMUSCULAR; INTRAVENOUS; SUBCUTANEOUS
Status: DISCONTINUED | OUTPATIENT
Start: 2018-08-22 | End: 2018-08-22 | Stop reason: HOSPADM

## 2018-08-22 RX ORDER — SCOLOPAMINE TRANSDERMAL SYSTEM 1 MG/1
1 PATCH, EXTENDED RELEASE TRANSDERMAL ONCE
Status: DISCONTINUED | OUTPATIENT
Start: 2018-08-22 | End: 2018-08-22 | Stop reason: HOSPADM

## 2018-08-22 RX ORDER — SCOLOPAMINE TRANSDERMAL SYSTEM 1 MG/1
1 PATCH, EXTENDED RELEASE TRANSDERMAL ONCE
Status: COMPLETED | OUTPATIENT
Start: 2018-08-22 | End: 2018-08-22

## 2018-08-22 RX ORDER — ONDANSETRON 2 MG/ML
4 INJECTION INTRAMUSCULAR; INTRAVENOUS EVERY 30 MIN PRN
Status: DISCONTINUED | OUTPATIENT
Start: 2018-08-22 | End: 2018-08-22 | Stop reason: HOSPADM

## 2018-08-22 RX ORDER — ALBUTEROL SULFATE 0.83 MG/ML
2.5 SOLUTION RESPIRATORY (INHALATION) EVERY 4 HOURS PRN
Status: DISCONTINUED | OUTPATIENT
Start: 2018-08-22 | End: 2018-08-22 | Stop reason: HOSPADM

## 2018-08-22 RX ORDER — ONDANSETRON 4 MG/1
4 TABLET, ORALLY DISINTEGRATING ORAL EVERY 30 MIN PRN
Status: DISCONTINUED | OUTPATIENT
Start: 2018-08-22 | End: 2018-08-22 | Stop reason: HOSPADM

## 2018-08-22 RX ORDER — ACETAMINOPHEN 325 MG/1
650 TABLET ORAL ONCE
Status: COMPLETED | OUTPATIENT
Start: 2018-08-22 | End: 2018-08-22

## 2018-08-22 RX ORDER — LIDOCAINE HYDROCHLORIDE 20 MG/ML
INJECTION, SOLUTION INFILTRATION; PERINEURAL PRN
Status: DISCONTINUED | OUTPATIENT
Start: 2018-08-22 | End: 2018-08-22

## 2018-08-22 RX ORDER — FENTANYL CITRATE 50 UG/ML
25-50 INJECTION, SOLUTION INTRAMUSCULAR; INTRAVENOUS
Status: DISCONTINUED | OUTPATIENT
Start: 2018-08-22 | End: 2018-08-22 | Stop reason: HOSPADM

## 2018-08-22 RX ORDER — FENTANYL CITRATE 50 UG/ML
INJECTION, SOLUTION INTRAMUSCULAR; INTRAVENOUS PRN
Status: DISCONTINUED | OUTPATIENT
Start: 2018-08-22 | End: 2018-08-22

## 2018-08-22 RX ORDER — DEXAMETHASONE SODIUM PHOSPHATE 4 MG/ML
4 INJECTION, SOLUTION INTRA-ARTICULAR; INTRALESIONAL; INTRAMUSCULAR; INTRAVENOUS; SOFT TISSUE EVERY 10 MIN PRN
Status: DISCONTINUED | OUTPATIENT
Start: 2018-08-22 | End: 2018-08-22 | Stop reason: HOSPADM

## 2018-08-22 RX ORDER — DEXAMETHASONE SODIUM PHOSPHATE 10 MG/ML
INJECTION, SOLUTION INTRAMUSCULAR; INTRAVENOUS PRN
Status: DISCONTINUED | OUTPATIENT
Start: 2018-08-22 | End: 2018-08-22

## 2018-08-22 RX ORDER — MEPERIDINE HYDROCHLORIDE 50 MG/ML
12.5 INJECTION INTRAMUSCULAR; INTRAVENOUS; SUBCUTANEOUS
Status: DISCONTINUED | OUTPATIENT
Start: 2018-08-22 | End: 2018-08-22 | Stop reason: HOSPADM

## 2018-08-22 RX ORDER — OXYCODONE HYDROCHLORIDE 5 MG/1
5 TABLET ORAL EVERY 4 HOURS PRN
Status: DISCONTINUED | OUTPATIENT
Start: 2018-08-22 | End: 2018-08-22 | Stop reason: HOSPADM

## 2018-08-22 RX ORDER — PROPOFOL 10 MG/ML
INJECTION, EMULSION INTRAVENOUS PRN
Status: DISCONTINUED | OUTPATIENT
Start: 2018-08-22 | End: 2018-08-22

## 2018-08-22 RX ORDER — KETOROLAC TROMETHAMINE 30 MG/ML
30 INJECTION, SOLUTION INTRAMUSCULAR; INTRAVENOUS EVERY 6 HOURS PRN
Status: DISCONTINUED | OUTPATIENT
Start: 2018-08-22 | End: 2018-08-22 | Stop reason: HOSPADM

## 2018-08-22 RX ORDER — LABETALOL HYDROCHLORIDE 5 MG/ML
10 INJECTION, SOLUTION INTRAVENOUS
Status: DISCONTINUED | OUTPATIENT
Start: 2018-08-22 | End: 2018-08-22 | Stop reason: HOSPADM

## 2018-08-22 RX ORDER — SODIUM CHLORIDE, SODIUM LACTATE, POTASSIUM CHLORIDE, CALCIUM CHLORIDE 600; 310; 30; 20 MG/100ML; MG/100ML; MG/100ML; MG/100ML
INJECTION, SOLUTION INTRAVENOUS CONTINUOUS
Status: DISCONTINUED | OUTPATIENT
Start: 2018-08-22 | End: 2018-08-22 | Stop reason: HOSPADM

## 2018-08-22 RX ORDER — HYDRALAZINE HYDROCHLORIDE 20 MG/ML
2.5-5 INJECTION INTRAMUSCULAR; INTRAVENOUS EVERY 10 MIN PRN
Status: DISCONTINUED | OUTPATIENT
Start: 2018-08-22 | End: 2018-08-22 | Stop reason: HOSPADM

## 2018-08-22 RX ORDER — OLOPATADINE HYDROCHLORIDE 2 MG/ML
1 SOLUTION/ DROPS OPHTHALMIC DAILY
Qty: 2.5 ML | Refills: 5 | Status: SHIPPED | OUTPATIENT
Start: 2018-08-22 | End: 2019-01-28

## 2018-08-22 RX ORDER — BACITRACIN ZINC 500 [USP'U]/G
OINTMENT TOPICAL PRN
Status: DISCONTINUED | OUTPATIENT
Start: 2018-08-22 | End: 2018-08-22 | Stop reason: HOSPADM

## 2018-08-22 RX ORDER — CEPHALEXIN 500 MG/1
500 CAPSULE ORAL 3 TIMES DAILY
Qty: 15 CAPSULE | Refills: 0 | Status: SHIPPED | OUTPATIENT
Start: 2018-08-22 | End: 2021-10-04

## 2018-08-22 RX ADMIN — SODIUM CHLORIDE, POTASSIUM CHLORIDE, SODIUM LACTATE AND CALCIUM CHLORIDE: 600; 310; 30; 20 INJECTION, SOLUTION INTRAVENOUS at 07:32

## 2018-08-22 RX ADMIN — ACETAMINOPHEN 650 MG: 325 TABLET, FILM COATED ORAL at 10:21

## 2018-08-22 RX ADMIN — SCOPALAMINE 1 PATCH: 1 PATCH, EXTENDED RELEASE TRANSDERMAL at 07:19

## 2018-08-22 RX ADMIN — LIDOCAINE HYDROCHLORIDE 3 ML: 10; .005 INJECTION, SOLUTION EPIDURAL; INFILTRATION; INTRACAUDAL; PERINEURAL at 08:45

## 2018-08-22 RX ADMIN — PROPOFOL 200 MG: 10 INJECTION, EMULSION INTRAVENOUS at 08:06

## 2018-08-22 RX ADMIN — FENTANYL CITRATE 100 MCG: 50 INJECTION, SOLUTION INTRAMUSCULAR; INTRAVENOUS at 08:04

## 2018-08-22 RX ADMIN — LIDOCAINE HYDROCHLORIDE 40 MG: 20 INJECTION, SOLUTION INFILTRATION; PERINEURAL at 08:06

## 2018-08-22 RX ADMIN — ROCURONIUM BROMIDE 30 MG: 10 INJECTION INTRAVENOUS at 08:06

## 2018-08-22 RX ADMIN — Medication 28.4 G: at 09:11

## 2018-08-22 RX ADMIN — DEXAMETHASONE SODIUM PHOSPHATE 10 MG: 10 INJECTION, SOLUTION INTRAMUSCULAR; INTRAVENOUS at 08:12

## 2018-08-22 NOTE — IP AVS SNAPSHOT
[facility]/Phase II    [address] [address]    Phone:  [phone]                                       After Visit Summary   8/22/2018    Jose C Corona    MRN: [id number]           After Visit Summary Signature Page     I have received my discharge instructions, and my questions have been answered. I have discussed any challenges I see with this plan with the nurse or doctor.    ..........................................................................................................................................  Patient/Patient Representative Signature      ..........................................................................................................................................  Patient Representative Print Name and Relationship to Patient    ..................................................               ................................................  Date                                            Time    ..........................................................................................................................................  Reviewed by Signature/Title    ...................................................              ..............................................  Date                                                            Time

## 2018-08-22 NOTE — ANESTHESIA CARE TRANSFER NOTE
Patient: Jose C Corona    Procedure(s):  EXCISION LEFT NASAL BASAL CELL CARCINOMA WITH FROZENS AND FLAP REPAIR  1 x 0.6  3 - Wound Class: I-Clean    Diagnosis: BASAL CELL CARINOMA/FACE  Diagnosis Additional Information: No value filed.    Anesthesia Type:   General, LMA     Note:  Airway :Room Air  Patient transferred to:Phase II  Handoff Report: Identifed the Patient, Identified the Reponsible Provider, Reviewed the pertinent medical history, Discussed the surgical course, Reviewed Intra-OP anesthesia mangement and issues during anesthesia, Set expectations for post-procedure period and Allowed opportunity for questions and acknowledgement of understanding      Vitals: (Last set prior to Anesthesia Care Transfer)    CRNA VITALS  8/22/2018 0855 - 8/22/2018 0938      8/22/2018             Pulse: 59    Ht Rate: 57    SpO2: 99 %    Resp Rate (observed): 16    Resp Rate (set): 8                Electronically Signed By: YAKELIN Griffith CRNA  August 22, 2018  9:38 AM

## 2018-08-22 NOTE — ANESTHESIA POSTPROCEDURE EVALUATION
Patient: Jose C Corona    Procedure(s):  EXCISION LEFT NASAL BASAL CELL CARCINOMA WITH FROZENS AND FLAP REPAIR  1 x 0.6  3 - Wound Class: I-Clean    Diagnosis:BASAL CELL CARINOMA/FACE  Diagnosis Additional Information: No value filed.    Anesthesia Type:  General, LMA    Note:  Anesthesia Post Evaluation    Patient location during evaluation: Phase 2, PACU and Bedside  Patient participation: Able to fully participate in evaluation  Level of consciousness: awake and alert  Pain management: adequate  Airway patency: patent  Cardiovascular status: acceptable  Respiratory status: acceptable  Hydration status: stable  PONV: none     Anesthetic complications: None          Last vitals:  Vitals:    08/22/18 1005 08/22/18 1010 08/22/18 1015   BP: 149/91 150/84 151/90   Resp: 16 16 16   Temp:   97.6  F (36.4  C)   SpO2: 100% 100% 99%         Electronically Signed By: Rodriguez Marino MD  August 22, 2018  10:37 AM

## 2018-08-22 NOTE — OP NOTE
Otolaryngology Operative Note     Pre-op Diagnosis: Nasal basal cell carcinoma , left nasal skin lesion  Post-op Diagnosis:  same  Procedures:  1.  excision superior left nasal dorsum basal cell carcinoma with defect measuring 1.8 x 1.4 cm (2.52 cm2) and closure using transposition flap repair   2.  Excision left inferior nasal lesion with defect measuring 3 mm and simple closure  Surgeon:  Yaneli Reeves D.O.  Anesthesia:  MAC with local  EBL:  1 ml  Findings: Basal cell carcinoma left superior nasal dorsum with 3-4 mm circumferential clear margins  Complications:  none  Condition:  stable     Full dictation   After consent was obtained the patient was brought back to the operating room and laid in a comfortable and supine position.  He was administered monitored anesthesia care by member of anesthesia.  Surgical loops were worn throughout the procedure    A timeout was taken.  I examined the left nose and demarcated 3 mm circumferential margins around the healed biopsy site of the left superior nasal basal cell carcinoma.  There is also a separate left inferior erythematous raised lesion that I demarcated.  The area was anesthetized with 1% lidocaine with 1-200,000 of epinephrine.  He was draped in normal sterile fashion.  The eyes were protected throughout the exam.  I then used a 15 blade to incise the left superior nasal lesion this is completely removed with tenotomy scissors.  The subcutaneous tissue is normal in appearance.  A marking stitch was placed at 12:00 and this was sent to pathology.  I used a separate 15 blade to incise the left inferior nasal skin lesion.  The inferior nasal skin lesion defect measured 3 mm.  The inferior nasal defect margins were clear upon frozen section the wound was irrigated photos were taken the defect measured 3 mm and was closed with an interrupted 5-0 nylon ×1.    Frozen section revealed basal cell carcinoma at the superior margin of the left superior nasal  lesion.  Therefore additional 3 mm of the left superior nasal dorsal skin was excised and sent to pathology.      The defect now measures 1.8 x 1.4 cm (2.52 cm2).  Margins are now clear there is no remaining basal cell carcinoma.    Photos were taken.  The wound was irrigated and gently suctioned hemostasis was achieved with scant use of bipolar cautery and is adequate.  I then designed a unilateral based transposition flap recruiting skin from the superior lateral nasal sidewall.  This area was similarly anesthetized.  A fresh 15 blade was used to incise the skin the flap was undermined widely  an the defect had been undermined widely.  The flap was swung into the defect and the subcutaneous closed tissue was closed with 5-0 Vicryl in interrupted fashion.  The skin was closed with 5-0 nylon in an interrupted fashion.  Skin was cleansed and dried and a small amount of bacitracin was placed directly over the incision. . Photos were taken.  While protecting the eyes benzoin was applied to the nasal dorsal skin and Steri-Strips were placed.     Then handed back over to anesthesia awakened and brought to recovery room in stable condition having tolerated procedure well

## 2018-08-22 NOTE — OR NURSING
Pt eating, drinking, and tolerating well. Discharge instructions discussed with Pt and wife. Pain 2/10. Pt requested acetaminophen for pain. 650mg PO given prior to discharge. IV removed. Dressed independently, and escorted to [facility] exit via ambulatory. Leeann 19.

## 2018-08-22 NOTE — IP AVS SNAPSHOT
MRN:[id number]                      After Visit Summary   8/22/2018    Jose C Corona    MRN: [id number]           Thank you!     Thank you for choosing [facility] for your care. Our goal is always to provide you with excellent care. Hearing back from our patients is one way we can continue to improve our services. Please take a few minutes to complete the written survey that you may receive in the mail after you visit with us. Thank you!        Patient Information     Date Of Birth          1955        About your hospital stay     You were admitted on:  August 22, 2018 You last received care in the:  [facility] Preop/Phase II    You were discharged on:  August 22, 2018       Who to Call     For medical emergencies, please call 911.  For non-urgent questions about your medical care, please call your primary care provider or clinic, [phone]  For questions related to your surgery, please call your surgery clinic        Attending Provider     Provider Specialty    Yaneli Reeves MD Otolaryngology       Primary Care Provider Office Phone # Fax #    Jackelyn Avery [phone]      Your next 10 appointments already scheduled     Aug 29, 2018  8:15 AM CDT   (Arrive by 8:00 AM)   Post Op with YAKELIN Hua CNP   [facility] ([facility] )    [address] [place] [address]   [phone]            Nov 13, 2018  8:45 AM CST   (Arrive by 8:30 AM)   New Visit with DAVIDSON Roberson MD   [facility] ([facility] )    [address] [place] [address] [phone]              Further instructions from your care team           Post-Anesthesia Patient Instructions    IMMEDIATELY FOLLOWING SURGERY:  Do not drive or operate machinery for the first twenty four hours after surgery.  Do not make any important decisions for twenty four hours after surgery or while taking narcotic pain medications or  sedatives.  If you develop intractable nausea and vomiting or a severe headache please notify your doctor immediately.    FOLLOW-UP:  Please make an appointment with your surgeon as instructed. You do not need to follow up with anesthesia unless specifically instructed to do so.    WOUND CARE INSTRUCTIONS (if applicable):  Keep a dry clean dressing on the anesthesia/puncture wound site if there is drainage.  Once the wound has quit draining you may leave it open to air.  Generally you should leave the bandage intact for twenty four hours unless there is drainage.  If the epidural site drains for more than 36-48 hours please call the anesthesia department.    QUESTIONS?:  Please feel free to call your physician or the hospital  if you have any questions, and they will be happy to assist you.   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  No remaining skin cancer  Follow up with dermatology in 6 months, we have arranged this appointment in [place] with Dr. Roberson    Follow up with MARGE Andersen ENT or Marcela Segundo NP in 1 week.   Call [phone] if you need an appointment.      No soaking of the wound x 2 weeks.  You may shower normally but keep the incision absolutely dry for 1 week.     After 1 week you may remove there steri strips.  If they are not easily removed, allow them to fall off on their own.  You may trim the edges of the steri strips if they curl with a clean cosmetic scissors.   After steri strips are removed:    Cleanse wound if not covered with diluted peroxide 3 times daily and then apply bacitracin or bactroban antibiotic ointment for 1 week.    2 weeks after surgery,  apply aquaphor ointment to the wound 3 times a day for 1 month then as needed.  This can be purchased over the counter.    For any heavy bleeding or excessive swelling  please contact our office at [phone] or present to the emergency room.              Pending Results     No orders found from 8/20/2018 to  "8/23/2018.            Admission Information     Date & Time Provider Department Dept. Phone    8/22/2018 Yaneli Reeves MD [facility] Preop/Phase [phone]      Your Vitals Were     Blood Pressure Temperature Respirations Height Weight Pulse Oximetry    154/85 98.1  F (36.7  C) (Oral) 16 1.803 m (5' 11\") 94.8 kg (209 lb) 100%    BMI (Body Mass Index)                   29.15 kg/m2           Care EveryWhere ID     This is your Care EveryWhere ID. This could be used by other organizations to access your [facility] medical records  [id number]        Equal Access to Services     [facility] : Hadii swathi De La Torre, waaxda oscar, qaybta kaalmada valerie, yamilet cordero . So [facility] [phone].    ATENCIÓN: Si habla español, tiene a lyons disposición servicios gratuitos de asistencia lingüística. [facility] [phone].    We comply with applicable federal civil rights laws and [place] laws. We do not discriminate on the basis of race, color, national origin, age, disability, sex, sexual orientation, or gender identity.               Review of your medicines      START taking        Dose / Directions    cephALEXin 500 MG capsule   Commonly known as:  KEFLEX   Used for:  Post-operative state        Dose:  500 mg   Take 1 capsule (500 mg) by mouth 3 times daily for 5 days   Quantity:  15 capsule   Refills:  0       olopatadine HCl 0.2 % Soln   Commonly known as:  PATADAY   Used for:  Allergic conjunctivitis and rhinitis, bilateral        Dose:  1 drop   Place 1 drop into both eyes daily Use any OTC lubricating eye drops in the morning and at night   Quantity:  2.5 mL   Refills:  5         CONTINUE these medicines which have NOT CHANGED        Dose / Directions    BECLOVENT IN        Dose:  1-2 puff   Inhale 1-2 puffs into the lungs as needed   Refills:  0       fluticasone 110 MCG/ACT Inhaler   Commonly known as:  FLOVENT HFA   Used for:  Mild persistent asthma without complication "        Dose:  1 puff   Inhale 1 puff into the lungs 2 times daily   Quantity:  12 g   Refills:  5            Where to get your medicines      These medications were sent to [facility] [address] [place], [place] - [address] AT [facility] OF [address] [place] [address]     Phone:  [phone]     cephALEXin 500 MG capsule    olopatadine HCl 0.2 % Soln                Protect others around you: Learn how to safely use, store and throw away your medicines at www.disposemymeds.org.        ANTIBIOTIC INSTRUCTION     You've Been Prescribed an Antibiotic - Now What?  Your healthcare team thinks that you or your loved one might have an infection. Some infections can be treated with antibiotics, which are powerful, life-saving drugs. Like all medications, antibiotics have side effects and should only be used when necessary. There are some important things you should know about your antibiotic treatment.      Your healthcare team may run tests before you start taking an antibiotic.    Your team may take samples (e.g., from your blood, urine or other areas) to run tests to look for bacteria. These test can be important to determine if you need an antibiotic at all and, if you do, which antibiotic will work best.      Within a few days, your healthcare team might change or even stop your antibiotic.    Your team may start you on an antibiotic while they are working to find out what is making you sick.    Your team might change your antibiotic because test results show that a different antibiotic would be better to treat your infection.    In some cases, once your team has more information, they learn that you do not need an antibiotic at all. They may find out that you don't have an infection, or that the antibiotic you're taking won't work against your infection. For example, an infection caused by a virus can't be treated with antibiotics. Staying on an antibiotic when you don't need  it is more likely to be harmful than helpful.      You may experience side effects from your antibiotic.    Like all medications, antibiotics have side effects. Some of these can be serious.    Let you healthcare team know if you have any known allergies when you are admitted to the hospital.    One significant side effect of nearly all antibiotics is the risk of severe and sometimes deadly diarrhea caused by Clostridium difficile (C. Difficile). This occurs when a person takes antibiotics because some good germs are destroyed. Antibiotic use allows C. diificile to take over, putting patients at high risk for this serious infection.    As a patient or caregiver, it is important to understand your or your loved one's antibiotic treatment. It is especially important for caregivers to speak up when patients can't speak for themselves. Here are some important questions to ask your healthcare team.    What infection is this antibiotic treating and how do you know I have that infection?    What side effects might occur from this antibiotic?    How long will I need to take this antibiotic?    Is it safe to take this antibiotic with other medications or supplements (e.g., vitamins) that I am taking?     Are there any special directions I need to know about taking this antibiotic? For example, should I take it with food?    How will I be monitored to know whether my infection is responding to the antibiotic?    What tests may help to make sure the right antibiotic is prescribed for me?      Information provided by:  www.cdc.gov/getsmart  U.S. Department of Health and Human Services  Centers for disease Control and Prevention  National Center for Emerging and Zoonotic Infectious Diseases  Division of Healthcare Quality Promotion             Medication List: This is a list of all your medications and when to take them. Check marks below indicate your daily home schedule. Keep this list as a reference.      Medications            Morning Afternoon Evening Bedtime As Needed    BECLOVENT IN   Inhale 1-2 puffs into the lungs as needed                                cephALEXin 500 MG capsule   Commonly known as:  KEFLEX   Take 1 capsule (500 mg) by mouth 3 times daily for 5 days                                fluticasone 110 MCG/ACT Inhaler   Commonly known as:  FLOVENT HFA   Inhale 1 puff into the lungs 2 times daily                                olopatadine HCl 0.2 % Soln   Commonly known as:  PATADAY   Place 1 drop into both eyes daily Use any OTC lubricating eye drops in the morning and at night

## 2018-08-22 NOTE — DISCHARGE INSTRUCTIONS
Post-Anesthesia Patient Instructions    IMMEDIATELY FOLLOWING SURGERY:  Do not drive or operate machinery for the first twenty four hours after surgery.  Do not make any important decisions for twenty four hours after surgery or while taking narcotic pain medications or sedatives.  If you develop intractable nausea and vomiting or a severe headache please notify your doctor immediately.    FOLLOW-UP:  Please make an appointment with your surgeon as instructed. You do not need to follow up with anesthesia unless specifically instructed to do so.    WOUND CARE INSTRUCTIONS (if applicable):  Keep a dry clean dressing on the anesthesia/puncture wound site if there is drainage.  Once the wound has quit draining you may leave it open to air.  Generally you should leave the bandage intact for twenty four hours unless there is drainage.  If the epidural site drains for more than 36-48 hours please call the anesthesia department.    QUESTIONS?:  Please feel free to call your physician or the hospital  if you have any questions, and they will be happy to assist you.   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  No remaining skin cancer  Follow up with dermatology in 6 months, we have arranged this appointment in [place] with Dr. Roberson    Follow up with MARGE Andersen ENT or Marcela Segundo NP in 1 week.   Call [phone] if you need an appointment.      No soaking of the wound x 2 weeks.  You may shower normally but keep the incision absolutely dry for 1 week.     After 1 week you may remove there steri strips.  If they are not easily removed, allow them to fall off on their own.  You may trim the edges of the steri strips if they curl with a clean cosmetic scissors.   After steri strips are removed:    Cleanse wound if not covered with diluted peroxide 3 times daily and then apply bacitracin or bactroban antibiotic ointment for 1 week.    2 weeks after surgery,  apply aquaphor ointment to the wound  3 times a day for 1 month then as needed.  This can be purchased over the counter.    For any heavy bleeding or excessive swelling  please contact our office at [phone] or present to the emergency room.

## 2018-08-23 LAB — COPATH REPORT: NORMAL

## 2018-08-29 ENCOUNTER — OFFICE VISIT (OUTPATIENT)
Dept: OTOLARYNGOLOGY | Facility: OTHER | Age: 63
End: 2018-08-29
Attending: NURSE PRACTITIONER
Payer: COMMERCIAL

## 2018-08-29 VITALS
OXYGEN SATURATION: 97 % | HEIGHT: 71 IN | HEART RATE: 64 BPM | BODY MASS INDEX: 29.26 KG/M2 | DIASTOLIC BLOOD PRESSURE: 68 MMHG | SYSTOLIC BLOOD PRESSURE: 134 MMHG | TEMPERATURE: 96.6 F | WEIGHT: 209 LBS

## 2018-08-29 DIAGNOSIS — Z09 S/P EXCISION OF SKIN LESION, FOLLOW-UP EXAM: Primary | ICD-10-CM

## 2018-08-29 DIAGNOSIS — C44.310 BCC (BASAL CELL CARCINOMA), FACE: ICD-10-CM

## 2018-08-29 PROCEDURE — 99024 POSTOP FOLLOW-UP VISIT: CPT | Performed by: NURSE PRACTITIONER

## 2018-08-29 ASSESSMENT — PAIN SCALES - GENERAL: PAINLEVEL: NO PAIN (0)

## 2018-08-29 NOTE — NURSING NOTE
"Chief Complaint   Patient presents with     Surgical Followup     s/p flap repair-8/22/18       Initial /68 (BP Location: Right arm, Patient Position: Chair, Cuff Size: Adult Regular)  Pulse 64  Temp 96.6  F (35.9  C) (Tympanic)  Ht 5' 11\" (1.803 m)  Wt 209 lb (94.8 kg)  SpO2 97%  BMI 29.15 kg/m2 Estimated body mass index is 29.15 kg/(m^2) as calculated from the following:    Height as of this encounter: 5' 11\" (1.803 m).    Weight as of this encounter: 209 lb (94.8 kg).  Medication Reconciliation: complete    Caitlin Dash LPN    "

## 2018-08-29 NOTE — MR AVS SNAPSHOT
After Visit Summary   8/29/2018    Jose C Corona    MRN: [id number]           Patient Information     Date Of Birth          1955        Visit Information        Provider Department      8/29/2018 8:15 AM Marcela Segundo APRN [facility]        Care Instructions      POST PROCEDURE INSTRUCTIONS      Remove your dressing in 24 hours (If you have one)    Wash incision with a mixture of half water and half hydrogen peroxide 3 times daily.    Apply Aquaphor Healing Ointment to the wound 3 times daily. (Unless specified Bacitracin)    Cover with a clean dressing if in a dirty breonna environment or when wet/soiled    Keep incision clean and dry   Do NOT soak in water such as a tub bath or swimming   Do NOT put make-up, powders, hairspray, lotions, etc on the incision       You can apply ice to the surgical area to help reduce swelling. (no longer than 20 minutes at a time)      You can use acetaminophen(Tylenol) or the prescription you received for pain.       If you have any bleeding, cover the wound with clean gauze and hold pressure for 10 Minutes. If the bleeding does not stop or is heavy and profuse, call the clinic or go to the Urgent Care/Emergency Department.    SIGNS OF INFECTION ARE:    Redness, swelling, red streaks, pus, drainage, warmth, fever, increased pain, foul smell.     Contact your primary health care provider if you notice any of the warning signs.     FOLLOW - UP    Follow-up in clinic for a nurse only visit in 7 days for suture removal.     Pathology results will be called to you when they are back. Usually 7-10 days.      6 WEEKS POST PROCEDURE      Apply ANY type of lotion to the suture site(Example - Vaseline Intensive Care or Vitamin E)    Massage the surgical area 1-2 times daily in a circular motion for 5 minutes, for a period of 2 months. This will help the scar heal better.     Thank you for allowing Marcela Segundo CNP and our ENT team to participate  "in your care.  If your medications are too expensive, please give the nurse a call.  We can possibly change this medication.  If you have a scheduling or an appointment question please contact [facility] [place] [place] [facility] Coordinator at their direct line [phone].   ALL nursing questions or concerns can be directed to your ENT nurse at: [phone] - [place]            Follow-ups after your visit        Your next 10 appointments already scheduled     Aug 31, 2018  1:15 PM CDT   (Arrive by 1:00 PM)   Return Visit with Bouchra Ferrer PA-C   [facility] ([facility] [place] )    [address] [facility] [place] [address]   [phone]            Nov 13, 2018  8:45 AM CST   (Arrive by 8:30 AM)   New Visit with DAVIDSON Roberson MD   [facility] ([facility]    [address]  [place] [id number] [phone]              Who to contact     If you have questions or need follow up information about today's clinic visit or your schedule please contact [facility] directly at [phone].  Normal or non-critical lab and imaging results will be communicated to you by MyChart, letter or phone within 4 business days after the clinic has received the results. If you do not hear from us within 7 days, please contact the clinic through MyChart or phone. If you have a critical or abnormal lab result, we will notify you by phone as soon as possible.  Submit refill requests through [facility] or call your pharmacy and they will forward the refill request to us. Please allow 3 business days for your refill to be completed.          Additional Information About Your Visit        Care EveryWhere ID     This is your Care EveryWhere ID. This could be used by other organizations to access your [place] medical records  [id number]        Your Vitals Were     Pulse Temperature Height Pulse Oximetry BMI (Body Mass Index)       64 96.6  F (35.9  C) (Tympanic) 5' 11\" (1.803 " m) 97% 29.15 kg/m2        Blood Pressure from Last 3 Encounters:   08/29/18 134/68   08/22/18 151/90   08/03/18 120/80    Weight from Last 3 Encounters:   08/29/18 209 lb (94.8 kg)   08/22/18 209 lb (94.8 kg)   08/03/18 206 lb (93.4 kg)              Today, you had the following     No orders found for display       Primary Care Provider Office Phone # Fax #    Jackelyn SALAZAR [address] [place] [phone] [address] [facility] [address]        Equal Access to Services     [facility] Center[facility] : Hadii swathi morales hadelisa Soeneida, waaxda ludangeloadaha, qaybta kaalmada valerie, yamilet cordero . So [facility] [phone].    ATENCIÓN: Si habla español, tiene a lyons disposición servicios gratuitos de asistencia lingüística. [facility] [phone].    We comply with applicable federal civil rights laws and [place] laws. We do not discriminate on the basis of race, color, national origin, age, disability, sex, sexual orientation, or gender identity.            Thank you!     Thank you for choosing [facility]  for your care. Our goal is always to provide you with excellent care. Hearing back from our patients is one way we can continue to improve our services. Please take a few minutes to complete the written survey that you may receive in the mail after your visit with us. Thank you!             Your Updated Medication List - Protect others around you: Learn how to safely use, store and throw away your medicines at www.disposemymeds.org.          This list is accurate as of 8/29/18  8:39 AM.  Always use your most recent med list.                   Brand Name Dispense Instructions for use Diagnosis    BECLOVENT IN      Inhale 1-2 puffs into the lungs as needed        fluticasone 110 MCG/ACT Inhaler    FLOVENT HFA    12 g    Inhale 1 puff into the lungs 2 times daily    Mild persistent asthma without complication       olopatadine HCl 0.2 % Soln    PATADAY    2.5 mL    Place 1 drop  into both eyes daily Use any OTC lubricating eye drops in the morning and at night    Allergic conjunctivitis and rhinitis, bilateral

## 2018-08-29 NOTE — LETTER
8/29/2018         RE: Jose C Corona  [address] [place] [address]        Dear Colleague,    Thank you for referring your patient, Jose C Corona, to the [facility] [place]. Please see a copy of my visit note below.    Otolaryngology Progress Note           Chief Complaint:     Patient presents with:  Surgical Followup: s/p flap repair-8/22/18         History of Present Illness:     Jose C Corona is a 63 year old male s/p excision of superior left nasal dorsum BCC with flap repair on 8/22/18, performed by Dr. Reeves.     Today is POD #7. He has almost completed the oral Keflex that was prescribed post operatively.  No fever or drainage. No facial numbness. He will get an occasional 'twinge' of pain at area of skin excision. He has not been doing anything to the area, as steri-strips have not yet fallen off.     Pre-op Diagnosis: Nasal basal cell carcinoma , left nasal skin lesion  Post-op Diagnosis:  same  Procedures:  1.  excision superior left nasal dorsum basal cell carcinoma with defect measuring 1.8 x 1.4 cm (2.52 cm2) and closure using transposition flap repair   2.  Excision left inferior nasal lesion with defect measuring 3 mm and simple closure  Surgeon:  Yaneli Reeves, DJOVANI.  Anesthesia:  MAC with local  EBL:  1 ml  Findings: Basal cell carcinoma left superior nasal dorsum with 3-4 mm circumferential clear margins  Complications:  none  Condition:  stable    INTRAOPERATIVE CONSULTATION:   A: Skin, left superior nasal lesion, excision, frozen section   diagnosis-basal cell carcinoma at 12:00 margin     B: Skin, left inferior nasal lesion, excision, frozen section   diagnosis-essentially normal skin     C: Skin, additional superior margin, left nasal lesion, excision, frozen   section diagnosis-no basal cell   carcinoma is present          Review of Systems:     See HPI         Physical Exam:   /68 (BP Location: Right arm, Patient Position: Chair, Cuff Size: Adult  "Regular)  Pulse 64  Temp 96.6  F (35.9  C) (Tympanic)  Ht 5' 11\" (1.803 m)  Wt 209 lb (94.8 kg)  SpO2 97%  BMI 29.15 kg/m2    General - The patient is well nourished and well developed, and appears to have good nutritional status.  Alert and oriented to person and place, interactive.  Head and Face - Normocephalic and atraumatic, with no gross asymmetry noted of the contour of the facial features.  The facial nerve is intact, with strong symmetric movements.  Neck-no palpable lymphadenopathy or thyroid mass.  Trachea is midline.  Eyes - Extraocular movements intact.   Ears- External ears normal. Canals clear. Right tympanic membrane intact without effusion, worrisome retraction or mass. Left tympanic membrane intact without effusion, worrisome retraction or mass.    Nose - Nasal mucosa is pink and moist with no abnormal mucus.  The septum was grossly midline and non-obstructive, turbinates of normal size and position.  No polyps, masses, or purulence noted on examination.  Mouth - Examination of the oral cavity shows pink, healthy, moist mucosa. Dentition in good condition.  No lesions or ulceration noted. The tongue is mobile and midline.    Throat - The walls of the oropharynx were smooth, pink, moist, symmetric, and had no lesions or ulcerations.  The tonsillar pillars and soft palate were symmetric.  The uvula was midline on elevation.      Skin- Superior nasal dorsum with steri-strips lifting up. Was able to remove without difficulty. Cleansed area with hydrogen peroxide/normal saline. No seroma/hematoma. Full sensation of skin. No drainage or signs of infection.  Removed nylon sutures from superior and inferior skin excision sites. Wound edges are well approximated. Medial flap with edges not completely approximated at this time, left in 1 nylon suture.         Assessment and Plan:       ICD-10-CM    1. S/P excision of skin lesion, follow-up exam Z09    2. BCC (basal cell carcinoma), face C44.310     " Clear margins intraoperatively 8/22/18     Nylon sutures removed from superior and inferior incisions.  Medial skin flap remains in place. Left 1 nylon suture in place on skin flap to allow for further wound healing.    He is to follow post operative wound care instructions.    Return on Friday for re-evaluation with the intent to remove the last suture of the skin graft.    He is to return sooner as needed, or with any signs of infection.       Marcela Segundo NP  ENT  [facility], [place]  [phone]      Post op      Again, thank you for allowing me to participate in the care of your patient.        Sincerely,        YAKELIN Ferris CNP

## 2018-08-29 NOTE — PROGRESS NOTES
"Otolaryngology Progress Note           Chief Complaint:     Patient presents with:  Surgical Followup: s/p flap repair-8/22/18         History of Present Illness:     Jose C Corona is a 63 year old male s/p excision of superior left nasal dorsum BCC with flap repair on 8/22/18, performed by Dr. Reeves.     Today is POD #7. He has almost completed the oral Keflex that was prescribed post operatively.  No fever or drainage. No facial numbness. He will get an occasional 'twinge' of pain at area of skin excision. He has not been doing anything to the area, as steri-strips have not yet fallen off.     Pre-op Diagnosis: Nasal basal cell carcinoma , left nasal skin lesion  Post-op Diagnosis:  same  Procedures:  1.  excision superior left nasal dorsum basal cell carcinoma with defect measuring 1.8 x 1.4 cm (2.52 cm2) and closure using transposition flap repair   2.  Excision left inferior nasal lesion with defect measuring 3 mm and simple closure  Surgeon:  Yaneli Reeves, D.O.  Anesthesia:  MAC with local  EBL:  1 ml  Findings: Basal cell carcinoma left superior nasal dorsum with 3-4 mm circumferential clear margins  Complications:  none  Condition:  stable    INTRAOPERATIVE CONSULTATION:   A: Skin, left superior nasal lesion, excision, frozen section   diagnosis-basal cell carcinoma at 12:00 margin     B: Skin, left inferior nasal lesion, excision, frozen section   diagnosis-essentially normal skin     C: Skin, additional superior margin, left nasal lesion, excision, frozen   section diagnosis-no basal cell   carcinoma is present          Review of Systems:     See HPI         Physical Exam:   /68 (BP Location: Right arm, Patient Position: Chair, Cuff Size: Adult Regular)  Pulse 64  Temp 96.6  F (35.9  C) (Tympanic)  Ht 5' 11\" (1.803 m)  Wt 209 lb (94.8 kg)  SpO2 97%  BMI 29.15 kg/m2    General - The patient is well nourished and well developed, and appears to have good nutritional status.  " Alert and oriented to person and place, interactive.  Head and Face - Normocephalic and atraumatic, with no gross asymmetry noted of the contour of the facial features.  The facial nerve is intact, with strong symmetric movements.  Neck-no palpable lymphadenopathy or thyroid mass.  Trachea is midline.  Eyes - Extraocular movements intact.   Ears- External ears normal. Canals clear. Right tympanic membrane intact without effusion, worrisome retraction or mass. Left tympanic membrane intact without effusion, worrisome retraction or mass.    Nose - Nasal mucosa is pink and moist with no abnormal mucus.  The septum was grossly midline and non-obstructive, turbinates of normal size and position.  No polyps, masses, or purulence noted on examination.  Mouth - Examination of the oral cavity shows pink, healthy, moist mucosa. Dentition in good condition.  No lesions or ulceration noted. The tongue is mobile and midline.    Throat - The walls of the oropharynx were smooth, pink, moist, symmetric, and had no lesions or ulcerations.  The tonsillar pillars and soft palate were symmetric.  The uvula was midline on elevation.      Skin- Superior nasal dorsum with steri-strips lifting up. Was able to remove without difficulty. Cleansed area with hydrogen peroxide/normal saline. No seroma/hematoma. Full sensation of skin. No drainage or signs of infection.  Removed nylon sutures from superior and inferior skin excision sites. Wound edges are well approximated. Medial flap with edges not completely approximated at this time, left in 1 nylon suture.         Assessment and Plan:       ICD-10-CM    1. S/P excision of skin lesion, follow-up exam Z09    2. BCC (basal cell carcinoma), face C44.310     Clear margins intraoperatively 8/22/18     Nylon sutures removed from superior and inferior incisions.  Medial skin flap remains in place. Left 1 nylon suture in place on skin flap to allow for further wound healing.    He is to follow post  operative wound care instructions.    Return on Friday for re-evaluation with the intent to remove the last suture of the skin graft.    He is to return sooner as needed, or with any signs of infection.       Marcela Segundo NP  [facility], [place]  [phone]

## 2018-08-29 NOTE — PATIENT INSTRUCTIONS
POST PROCEDURE INSTRUCTIONS      Remove your dressing in 24 hours (If you have one)    Wash incision with a mixture of half water and half hydrogen peroxide 3 times daily.    Apply Aquaphor Healing Ointment to the wound 3 times daily. (Unless specified Bacitracin)    Cover with a clean dressing if in a dirty breonna environment or when wet/soiled    Keep incision clean and dry   Do NOT soak in water such as a tub bath or swimming   Do NOT put make-up, powders, hairspray, lotions, etc on the incision       You can apply ice to the surgical area to help reduce swelling. (no longer than 20 minutes at a time)      You can use acetaminophen(Tylenol) or the prescription you received for pain.       If you have any bleeding, cover the wound with clean gauze and hold pressure for 10 Minutes. If the bleeding does not stop or is heavy and profuse, call the clinic or go to the Urgent Care/Emergency Department.    SIGNS OF INFECTION ARE:    Redness, swelling, red streaks, pus, drainage, warmth, fever, increased pain, foul smell.     Contact your primary health care provider if you notice any of the warning signs.     FOLLOW - UP    Follow-up in clinic for a nurse only visit in 7 days for suture removal.     Pathology results will be called to you when they are back. Usually 7-10 days.      6 WEEKS POST PROCEDURE      Apply ANY type of lotion to the suture site(Example - Vaseline Intensive Care or Vitamin E)    Massage the surgical area 1-2 times daily in a circular motion for 5 minutes, for a period of 2 months. This will help the scar heal better.     Thank you for allowing Marcela Segundo CNP and our ENT team to participate in your care.  If your medications are too expensive, please give the nurse a call.  We can possibly change this medication.  If you have a scheduling or an appointment question please contact [facility] [place] [place] [facility] [place] [facility] Coordinator at their direct line [phone].   ALL nursing questions or concerns can be  directed to your ENT nurse at: [phone] - [place]

## 2018-08-31 ENCOUNTER — OFFICE VISIT (OUTPATIENT)
Dept: OTOLARYNGOLOGY | Facility: OTHER | Age: 63
End: 2018-08-31
Attending: PHYSICIAN ASSISTANT
Payer: COMMERCIAL

## 2018-08-31 VITALS
TEMPERATURE: 96.7 F | WEIGHT: 209 LBS | DIASTOLIC BLOOD PRESSURE: 62 MMHG | BODY MASS INDEX: 29.26 KG/M2 | HEIGHT: 71 IN | HEART RATE: 78 BPM | SYSTOLIC BLOOD PRESSURE: 128 MMHG | OXYGEN SATURATION: 96 %

## 2018-08-31 DIAGNOSIS — C44.310 BCC (BASAL CELL CARCINOMA), FACE: ICD-10-CM

## 2018-08-31 DIAGNOSIS — Z09 S/P EXCISION OF SKIN LESION, FOLLOW-UP EXAM: Primary | ICD-10-CM

## 2018-08-31 DIAGNOSIS — Z48.02 ENCOUNTER FOR REMOVAL OF SUTURES: ICD-10-CM

## 2018-08-31 PROCEDURE — 99024 POSTOP FOLLOW-UP VISIT: CPT | Performed by: PHYSICIAN ASSISTANT

## 2018-08-31 ASSESSMENT — PAIN SCALES - GENERAL: PAINLEVEL: NO PAIN (0)

## 2018-08-31 NOTE — PATIENT INSTRUCTIONS
Nose looks well.   2 sutures were removed.   Continue with nasal cares with Bacitracin for 3-5 days. Once healed continue with peroxide/ water then apply Aquaphor       Thank you for allowing LUCY Andersen and our ENT team to participate in your care.  If your medications are too expensive, please give the nurse a call.  We can possibly change this medication.  If you have a scheduling or an appointment question please contact [facility] Unit Coordinator at their direct line [phone].   ALL nursing questions or concerns can be directed to your ENT nurse at: [phone] [place]

## 2018-08-31 NOTE — NURSING NOTE
"Chief Complaint   Patient presents with     Surgical Followup     s/p excision superior left nasal dorsum BCC with flap excision left inferior lesion-8/22/18-- one stitch in flap remaining        Initial /62 (BP Location: Right arm, Patient Position: Chair, Cuff Size: Adult Regular)  Pulse 78  Temp 96.7  F (35.9  C) (Tympanic)  Ht 5' 11\" (1.803 m)  Wt 209 lb (94.8 kg)  SpO2 96%  BMI 29.15 kg/m2 Estimated body mass index is 29.15 kg/(m^2) as calculated from the following:    Height as of this encounter: 5' 11\" (1.803 m).    Weight as of this encounter: 209 lb (94.8 kg).  Medication Reconciliation: complete    Caitlin Dash LPN    "

## 2018-08-31 NOTE — MR AVS SNAPSHOT
After Visit Summary   8/31/2018    Jose C Corona    MRN: [id number]           Patient Information     Date Of Birth          1955        Visit Information        Provider Department      8/31/2018 1:15 PM Bouchra Ferrer PA-C [place] [facility]        Today's Diagnoses     S/P excision of skin lesion, follow-up exam    -  1    BCC (basal cell carcinoma), face        Encounter for removal of sutures          Care Instructions    Nose looks well.   2 sutures were removed.   Continue with nasal cares with Bacitracin for 3-5 days. Once healed continue with peroxide/ water then apply Aquaphor       Thank you for allowing LUCY Andersen and our ENT team to participate in your care.  If your medications are too expensive, please give the nurse a call.  We can possibly change this medication.  If you have a scheduling or an appointment question please contact [facility] Unit Coordinator at their direct line [phone].   ALL nursing questions or concerns can be directed to your ENT nurse at: [phone] [place]              Follow-ups after your visit        Your next 10 appointments already scheduled     Nov 13, 2018  8:45 AM CST   (Arrive by 8:30 AM)   New Visit with DAVIDSON Roberson MD   [facility] [place] ([facility] [place] )    [address]  [place] [address] [phone]              Who to contact     If you have questions or need follow up information about today's clinic visit or your schedule please contact [facility], [place] directly at [phone].  Normal or non-critical lab and imaging results will be communicated to you by MyChart, letter or phone within 4 business days after the clinic has received the results. If you do not hear from us within 7 days, please contact the clinic through MyChart or phone. If you have a critical or abnormal lab result, we will notify you by phone as soon as possible.  Submit refill requests through [employer] or  "call your pharmacy and they will forward the refill request to us. Please allow 3 business days for your refill to be completed.          Additional Information About Your Visit        Care EveryWhere ID     This is your Care EveryWhere ID. This could be used by other organizations to access your [place] medical records  [id number]        Your Vitals Were     Pulse Temperature Height Pulse Oximetry BMI (Body Mass Index)       78 96.7  F (35.9  C) (Tympanic) 5' 11\" (1.803 m) 96% 29.15 kg/m2        Blood Pressure from Last 3 Encounters:   08/31/18 128/62   08/29/18 134/68   08/22/18 151/90    Weight from Last 3 Encounters:   08/31/18 209 lb (94.8 kg)   08/29/18 209 lb (94.8 kg)   08/22/18 209 lb (94.8 kg)              Today, you had the following     No orders found for display       Primary Care Provider Office Phone # Fax #    Jackelyn Avery [phone] [address] [facility] [address]        Equal Access to Services     [facility]: Hadii swathi woody Soeneida, waaxda luqadaha, qaybta kaalmamaik padilla, yamilet cordero . So [facility] [phone].    ATENCIÓN: Si habla español, tiene a lyons disposición servicios gratuitos de asistencia lingüística. [facility] [phone].    We comply with applicable federal civil rights laws and [place] laws. We do not discriminate on the basis of race, color, national origin, age, disability, sex, sexual orientation, or gender identity.            Thank you!     Thank you for choosing [facility] [place]  for your care. Our goal is always to provide you with excellent care. Hearing back from our patients is one way we can continue to improve our services. Please take a few minutes to complete the written survey that you may receive in the mail after your visit with us. Thank you!             Your Updated Medication List - Protect others around you: Learn how to safely use, store and throw away your " medicines at www.disposemymeds.org.          This list is accurate as of 8/31/18  1:29 PM.  Always use your most recent med list.                   Brand Name Dispense Instructions for use Diagnosis    BECLOVENT IN      Inhale 1-2 puffs into the lungs as needed        fluticasone 110 MCG/ACT Inhaler    FLOVENT HFA    12 g    Inhale 1 puff into the lungs 2 times daily    Mild persistent asthma without complication       olopatadine HCl 0.2 % Soln    PATADAY    2.5 mL    Place 1 drop into both eyes daily Use any OTC lubricating eye drops in the morning and at night    Allergic conjunctivitis and rhinitis, bilateral

## 2018-08-31 NOTE — PROGRESS NOTES
"Chief Complaint   Patient presents with     Surgical Followup     s/p excision superior left nasal dorsum BCC with flap excision left inferior lesion-8/22/18-- one stitch in flap remaining      Patient returns for repeat exam. He is POD#9. No concerns.  Denies pain.  He has no drainage or bleeding. No fevers. Here for suture removal.     Pre-op Diagnosis: Nasal basal cell carcinoma , left nasal skin lesion  Post-op Diagnosis:  same  Procedures:  1.  excision superior left nasal dorsum basal cell carcinoma with defect measuring 1.8 x 1.4 cm (2.52 cm2) and closure using transposition flap repair   2.  Excision left inferior nasal lesion with defect measuring 3 mm and simple closure  Surgeon:  Yaneli Reeves D.O.  Anesthesia:  MAC with local  EBL:  1 ml  Findings: Basal cell carcinoma left superior nasal dorsum with 3-4 mm circumferential clear margins  Complications:  none  Condition:  stable  Past Medical History:   Diagnosis Date     Mild persistent asthma 6/26/2013        Allergies   Allergen Reactions     Seasonal Allergies      Current Outpatient Prescriptions   Medication     Beclomethasone Dipropionate (BECLOVENT IN)     fluticasone (FLOVENT HFA) 110 MCG/ACT Inhaler     olopatadine HCl (PATADAY) 0.2 % SOLN     [DISCONTINUED] albuterol (PROAIR HFA, PROVENTIL HFA, VENTOLIN HFA) 108 (90 BASE) MCG/ACT inhaler     No current facility-administered medications for this visit.       ROS: 10 point ROS neg other than the symptoms noted above in the HPI.  /62 (BP Location: Right arm, Patient Position: Chair, Cuff Size: Adult Regular)  Pulse 78  Temp 96.7  F (35.9  C) (Tympanic)  Ht 5' 11\" (1.803 m)  Wt 209 lb (94.8 kg)  SpO2 96%  BMI 29.15 kg/m2  General - The patient is well nourished and well developed, and appears to have good nutritional status.  Alert and oriented to person and place, interactive.  Head and Face - Normocephalic and atraumatic, with no gross asymmetry noted of the contour of the " facial features.  The facial nerve is intact, with strong symmetric movements.  Neck-no palpable lymphadenopathy or thyroid mass.  Trachea is midline.    Skin- Superior nasal dorsum - Cleansed area with hydrogen peroxide/normal saline. No seroma/hematoma. Full sensation of skin. No drainage or signs of infection.  Removed nylon sutures from central site, 2 sutures removed.   Wound edges are well approximated. Flap appears well healed.     ASSESSMENT:    ICD-10-CM    1. S/P excision of skin lesion, follow-up exam Z09    2. BCC (basal cell carcinoma), face C44.310    3. Encounter for removal of sutures Z48.02      Doing well.   Continue with wound cares.  Discussed hats/ sunscreen.   Return PRN      Bouchra Ferrer PA-C  ENT  [facility], [place]  [phone]

## 2018-10-27 ENCOUNTER — TRANSFERRED RECORDS (OUTPATIENT)
Dept: HEALTH INFORMATION MANAGEMENT | Facility: CLINIC | Age: 63
End: 2018-10-27

## 2018-11-13 ENCOUNTER — OFFICE VISIT (OUTPATIENT)
Dept: DERMATOLOGY | Facility: OTHER | Age: 63
End: 2018-11-13
Attending: DERMATOLOGY
Payer: COMMERCIAL

## 2018-11-13 VITALS
OXYGEN SATURATION: 98 % | BODY MASS INDEX: 29.26 KG/M2 | SYSTOLIC BLOOD PRESSURE: 126 MMHG | HEIGHT: 71 IN | DIASTOLIC BLOOD PRESSURE: 74 MMHG | HEART RATE: 67 BPM | WEIGHT: 209 LBS

## 2018-11-13 DIAGNOSIS — Z12.83 SCREENING FOR SKIN CANCER: ICD-10-CM

## 2018-11-13 DIAGNOSIS — Z23 NEED FOR PROPHYLACTIC VACCINATION AND INOCULATION AGAINST INFLUENZA: Primary | ICD-10-CM

## 2018-11-13 DIAGNOSIS — D22.9 MULTIPLE BENIGN NEVI: ICD-10-CM

## 2018-11-13 PROCEDURE — 90686 IIV4 VACC NO PRSV 0.5 ML IM: CPT | Performed by: DERMATOLOGY

## 2018-11-13 PROCEDURE — 99202 OFFICE O/P NEW SF 15 MIN: CPT | Mod: 25 | Performed by: DERMATOLOGY

## 2018-11-13 PROCEDURE — 90471 IMMUNIZATION ADMIN: CPT | Performed by: DERMATOLOGY

## 2018-11-13 ASSESSMENT — PAIN SCALES - GENERAL: PAINLEVEL: NO PAIN (0)

## 2018-11-13 NOTE — PROGRESS NOTES

## 2018-11-13 NOTE — LETTER
11/13/2018       RE: Jose C Corona  [address]  [facility] [address]     Dear Colleague,    Thank you for referring your patient, Jose C Corona, [facility]. Please see a copy of my visit note below.      Injectable Influenza Immunization Documentation    1.  Is the person to be vaccinated sick today?   No    2. Does the person to be vaccinated have an allergy to a component   of the vaccine?   No  Egg Allergy Algorithm Link    3. Has the person to be vaccinated ever had a serious reaction   to influenza vaccine in the past?   No    4. Has the person to be vaccinated ever had Guillain-Barré syndrome?   No    Form completed by Keyona Boyd             Again, thank you for allowing me to participate in the care of your patient.      Sincerely,    DAVIDSON Roberson MD

## 2018-11-13 NOTE — MR AVS SNAPSHOT
"              After Visit Summary   11/13/2018    Jose C Corona    MRN: [id number]           Patient Information     Date Of Birth          1955        Visit Information        Provider Department      11/13/2018 8:45 AM DAVIDSON Roberson MD [facility]        Today's Diagnoses     Need for prophylactic vaccination and inoculation against influenza    -  1    Multiple benign nevi        Screening for skin cancer           Follow-ups after your visit        Who to contact     If you have questions or need follow up information about today's clinic visit or your schedule please contact [facility] directly at [phone].  Normal or non-critical lab and imaging results will be communicated to you by MyChart, letter or phone within 4 business days after the clinic has received the results. If you do not hear from us within 7 days, please contact the clinic through MyChart or phone. If you have a critical or abnormal lab result, we will notify you by phone as soon as possible.  Submit refill requests through [employer] or call your pharmacy and they will forward the refill request to us. Please allow 3 business days for your refill to be completed.          Additional Information About Your Visit        Care EveryWhere ID     This is your Care EveryWhere ID. This could be used by other organizations to access your [facility] medical records  [id number]        Your Vitals Were     Pulse Height Pulse Oximetry BMI (Body Mass Index)          67 1.803 m (5' 11\") 98% 29.15 kg/m2         Blood Pressure from Last 3 Encounters:   11/13/18 126/74   08/31/18 128/62   08/29/18 134/68    Weight from Last 3 Encounters:   11/13/18 94.8 kg (209 lb)   08/31/18 94.8 kg (209 lb)   08/29/18 94.8 kg (209 lb)              We Performed the Following     HC FLU VAC PRESRV FREE QUAD SPLIT VIR 3+YRS IM        Primary Care Provider Office Phone # Fax #    Jackelyn Avery [phone] " [phone]       [phone] [facility] [id number]        Equal Access to Services     SAMMYVERONICA KYLER : Hadii aad ku hadcelsadenise De La Torre, wahankda yesicaadaha, qasabra lawsonyosefda kishoreandreamaik, yamilet valdes rustamyuliana womackmyronpalak blas. So [facility] [phone].    ATENCIÓN: Si habla español, tiene a lyons disposición servicios gratuitos de asistencia lingüística. Joshame al [phone].    We comply with applicable federal civil rights laws and [place] laws. We do not discriminate on the basis of race, color, national origin, age, disability, sex, sexual orientation, or gender identity.            Thank you!     Thank you for choosing [facility]  for your care. Our goal is always to provide you with excellent care. Hearing back from our patients is one way we can continue to improve our services. Please take a few minutes to complete the written survey that you may receive in the mail after your visit with us. Thank you!             Your Updated Medication List - Protect others around you: Learn how to safely use, store and throw away your medicines at www.disposemymeds.org.          This list is accurate as of 11/13/18 11:59 PM.  Always use your most recent med list.                   Brand Name Dispense Instructions for use Diagnosis    BECLOVENT IN      Inhale 1-2 puffs into the lungs as needed        fluticasone 110 MCG/ACT Inhaler    FLOVENT HFA    12 g    Inhale 1 puff into the lungs 2 times daily    Mild persistent asthma without complication       olopatadine HCl 0.2 % Soln    PATADAY    2.5 mL    Place 1 drop into both eyes daily Use any OTC lubricating eye drops in the morning and at night    Allergic conjunctivitis and rhinitis, bilateral

## 2018-11-13 NOTE — NURSING NOTE
"Chief Complaint   Patient presents with     Derm Problem     Facial Lesion; Referred by Dr. Yaneli Reeves       Initial /74 (Cuff Size: Adult Regular)  Pulse 67  Ht 1.803 m (5' 11\")  Wt 94.8 kg (209 lb)  SpO2 98%  BMI 29.15 kg/m2 Estimated body mass index is 29.15 kg/(m^2) as calculated from the following:    Height as of this encounter: 1.803 m (5' 11\").    Weight as of this encounter: 94.8 kg (209 lb).  Medication Reconciliation: complete    Keyona Boyd LPN    "

## 2018-11-14 NOTE — CONSULTS
Consult Date:  2018      SUBJECTIVE:  First visit for Adolfo, who is a gentleman who had the basal cell removed and repaired on his nose recently.  Site is healing nicely.  There is no obvious deformity, and I think the outcome will be excellent.  He comes in for a skin check because of that lesion.      OBJECTIVE:  Shows a healthy gentleman in no distress.  We checked his face, neck, chest, back, arms, legs, buttocks.  We found no lesions of concern, nothing to suggest actinic activity, nothing similar to the basal cell type of lesion.  Did have a small lesion on his anterior leg which was from a recent injury.  Has numerous seborrheic keratoses and benign looking nevi.      ASSESSMENT:  No worrisome lesions, history of basal cell.        PLAN:  Return in 1 year for reexamination.         DAVIDSON COVINGTON MD             D: 2018   T: 2018   MT: CANDACE      Name:     ZAN LOPEZ   MRN:      [id number]        Account:       [id number]   :      1955           Consult Date:  2018      Document: [id number]       cc: Yaneli Reeves DO

## 2019-01-28 ENCOUNTER — OFFICE VISIT (OUTPATIENT)
Dept: FAMILY MEDICINE | Facility: OTHER | Age: 64
End: 2019-01-28
Attending: NURSE PRACTITIONER
Payer: COMMERCIAL

## 2019-01-28 ENCOUNTER — ANCILLARY PROCEDURE (OUTPATIENT)
Dept: GENERAL RADIOLOGY | Facility: OTHER | Age: 64
End: 2019-01-28
Attending: NURSE PRACTITIONER
Payer: COMMERCIAL

## 2019-01-28 VITALS
DIASTOLIC BLOOD PRESSURE: 84 MMHG | RESPIRATION RATE: 14 BRPM | HEART RATE: 70 BPM | OXYGEN SATURATION: 93 % | BODY MASS INDEX: 28.73 KG/M2 | SYSTOLIC BLOOD PRESSURE: 124 MMHG | TEMPERATURE: 97.2 F | WEIGHT: 206 LBS

## 2019-01-28 DIAGNOSIS — R09.89 CHEST CONGESTION: Primary | ICD-10-CM

## 2019-01-28 DIAGNOSIS — J20.9 ACUTE BRONCHITIS, UNSPECIFIED ORGANISM: ICD-10-CM

## 2019-01-28 PROCEDURE — 71046 X-RAY EXAM CHEST 2 VIEWS: CPT | Mod: TC

## 2019-01-28 PROCEDURE — 99214 OFFICE O/P EST MOD 30 MIN: CPT | Performed by: NURSE PRACTITIONER

## 2019-01-28 RX ORDER — ALBUTEROL SULFATE 90 UG/1
2 AEROSOL, METERED RESPIRATORY (INHALATION) EVERY 6 HOURS
Qty: 1 INHALER | Refills: 5 | Status: SHIPPED | OUTPATIENT
Start: 2019-01-28 | End: 2023-08-16

## 2019-01-28 RX ORDER — PREDNISONE 20 MG/1
20 TABLET ORAL 2 TIMES DAILY
Qty: 10 TABLET | Refills: 0 | Status: SHIPPED | OUTPATIENT
Start: 2019-01-28 | End: 2021-10-04

## 2019-01-28 RX ORDER — AZITHROMYCIN 250 MG/1
TABLET, FILM COATED ORAL
Qty: 11 TABLET | Refills: 0 | Status: SHIPPED | OUTPATIENT
Start: 2019-01-28 | End: 2021-10-04

## 2019-01-28 RX ORDER — ALBUTEROL SULFATE 90 UG/1
2 AEROSOL, METERED RESPIRATORY (INHALATION) EVERY 6 HOURS PRN
Qty: 1 INHALER | Refills: 3 | Status: CANCELLED | OUTPATIENT
Start: 2019-01-28

## 2019-01-28 ASSESSMENT — PAIN SCALES - GENERAL: PAINLEVEL: MILD PAIN (2)

## 2019-01-28 NOTE — PROGRESS NOTES
SUBJECTIVE:   Jose C Corona is a 63 year old male who presents to clinic today for the following health issues:          Acute Illness   Acute illness concerns: Cough  Onset: 1 week    Fever: YES    Chills/Sweats: YES    Headache (location?): YES    Sinus Pressure:YES    Conjunctivitis:  no    Ear Pain: no    Rhinorrhea: YES    Congestion: YES- chest    Sore Throat: no      Cough: YES - productive    Wheeze: YES    Decreased Appetite: YES    Nausea: no     Vomiting: no     Diarrhea:  YES    Dysuria/Freq.: YES    Fatigue/Achiness: YES    Sick/Strep Exposure: no      Therapies Tried and outcome: Day and nightqil, halls lozengers, some relief ata and off      Problem list and histories reviewed & adjusted, as indicated.  Additional history: as documented    Patient Active Problem List   Diagnosis     Mild intermittent asthma without complication     ACP (advance care planning)     Past Surgical History:   Procedure Laterality Date     CIRCUMCISION       COLONOSCOPY  2007    normal     COLONOSCOPY  06/2018    2 small polyps recheck 10 years per patient     EXCISE LESION FACE WITH FLAP PEDICLE Left 8/22/2018    Procedure: EXCISE LESION FACE WITH FLAP PEDICLE;  EXCISION LEFT NASAL BASAL CELL CARCINOMA WITH FROZENS AND FLAP REPAIR  1 x 0.6  3;  Surgeon: Yaneli Reeves MD;  Location: [facility]     ORTHOPEDIC SURGERY  12/2013    left knee meniscus repair       Social History     Tobacco Use     Smoking status: Never Smoker     Smokeless tobacco: Never Used   Substance Use Topics     Alcohol use: Yes     Comment: weekends, beer     Family History   Problem Relation Age of Onset     Cancer Mother 54        lung     Heart Disease Father 75        MI     Other Cancer Brother 65        PASSED FROM PROSTATE CANCER     Genitourinary Problems Brother         enlarged prostate         Current Outpatient Medications   Medication Sig Dispense Refill     albuterol (PROAIR HFA/PROVENTIL HFA/VENTOLIN HFA) 108 (90 Base) MCG/ACT  inhaler Inhale 2 puffs into the lungs every 6 hours 1 Inhaler 5     fluticasone (FLOVENT HFA) 110 MCG/ACT Inhaler Inhale 1 puff into the lungs 2 times daily 12 g 5     Allergies   Allergen Reactions     Seasonal Allergies      Recent Labs   Lab Test 04/27/18  1541 09/01/16  1515 01/08/15  1105 12/05/13  1401   LDL 93  --  101  --    HDL 65  --  77  --    TRIG 90  --  75  --    ALT  --  35  --   --    CR  --  0.98  --  1.10   GFRESTIMATED  --  78  --  69   GFRESTBLACK  --  >90   GFR Calc    --  84   POTASSIUM  --  4.6  --  4.0      BP Readings from Last 3 Encounters:   01/28/19 124/84   11/13/18 126/74   08/31/18 128/62    Wt Readings from Last 3 Encounters:   01/28/19 93.4 kg (206 lb)   11/13/18 94.8 kg (209 lb)   08/31/18 94.8 kg (209 lb)                  Labs reviewed in EPIC    Reviewed and updated as needed this visit by clinical staff       Reviewed and updated as needed this visit by Provider         ROS:  Constitutional, HEENT, cardiovascular, pulmonary, gi and gu systems are negative, except as otherwise noted.    OBJECTIVE:     /84 (BP Location: Left arm, Patient Position: Sitting, Cuff Size: Adult Large)   Pulse 70   Temp 97.2  F (36.2  C)   Resp 14   Wt 93.4 kg (206 lb)   SpO2 93%   BMI 28.73 kg/m    Body mass index is 28.73 kg/m .     GENERAL: healthy, alert and no distress  EYES: Eyes grossly normal to inspection, PERRL and conjunctivae and sclerae normal  HENT: ear canals and TM's normal, nose and mouth without ulcers or lesions  NECK: no adenopathy, no asymmetry, masses, or scars and thyroid normal to palpation  RESP: diffuse wheezing, deep cough  CV: regular rate and rhythm, normal S1 S2, no S3 or S4, no murmur, click or rub, no peripheral edema and peripheral pulses strong  SKIN: no suspicious lesions or rashes      CXR completed pending radiology review      ASSESSMENT/PLAN:     1. Chest congestion  - XR CHEST 2 VW (Clinic Performed)  - albuterol (PROAIR HFA/PROVENTIL  HFA/VENTOLIN HFA) 108 (90 Base) MCG/ACT inhaler; Inhale 2 puffs into the lungs every 6 hours  Dispense: 1 Inhaler; Refill: 5  - azithromycin (ZITHROMAX) 250 MG tablet; Take 2 tablets (500 mg) by mouth daily for 1 day, THEN 1 tablet (250 mg) daily for 9 days.  Dispense: 11 tablet; Refill: 0  - predniSONE (DELTASONE) 20 MG tablet; Take 20 mg by mouth 2 times daily for 5 days.  Dispense: 10 tablet; Refill: 0    2. Acute bronchitis, unspecified organism  - azithromycin (ZITHROMAX) 250 MG tablet; Take 2 tablets (500 mg) by mouth daily for 1 day, THEN 1 tablet (250 mg) daily for 9 days.  Dispense: 11 tablet; Refill: 0  - predniSONE (DELTASONE) 20 MG tablet; Take 20 mg by mouth 2 times daily for 5 days.  Dispense: 10 tablet; Refill: 0      Fluids  Rest  Temp control at home  Humidity at home - add bacteriostatic solution to humidifier  OTC Mucinex liquid cough and cold  Add Zicam or other zinc daily until you feel better  To ER or UC if symptoms increase  Return if you do not improve        Lynn Chau NP  [facility]

## 2019-01-28 NOTE — RESULT ENCOUNTER NOTE
Right middle lobe infiltrate, was diagnosed with bronchitis - treatment plan in place, should do really well.  Follow-up CXR 4 weeks to confirm resolution of this finding.  Follow-up with new or ongoing concern    Lynn [facility]ELMan[facility] Psychiatric Center  [phone]

## 2019-01-28 NOTE — NURSING NOTE
"Chief Complaint   Patient presents with     URI       Initial /84 (BP Location: Left arm, Patient Position: Sitting, Cuff Size: Adult Large)   Pulse 70   Temp 97.2  F (36.2  C)   Resp 14   Wt 93.4 kg (206 lb)   SpO2 93%   BMI 28.73 kg/m   Estimated body mass index is 28.73 kg/m  as calculated from the following:    Height as of 11/13/18: 1.803 m (5' 11\").    Weight as of this encounter: 93.4 kg (206 lb).  Medication Reconciliation: complete    Adrianne Mancia LPN    "

## 2019-01-28 NOTE — PATIENT INSTRUCTIONS
ASSESSMENT/PLAN:     1. Chest congestion  - XR CHEST 2 VW (Clinic Performed)  - albuterol (PROAIR HFA/PROVENTIL HFA/VENTOLIN HFA) 108 (90 Base) MCG/ACT inhaler; Inhale 2 puffs into the lungs every 6 hours  Dispense: 1 Inhaler; Refill: 5  - azithromycin (ZITHROMAX) 250 MG tablet; Take 2 tablets (500 mg) by mouth daily for 1 day, THEN 1 tablet (250 mg) daily for 9 days.  Dispense: 11 tablet; Refill: 0  - predniSONE (DELTASONE) 20 MG tablet; Take 20 mg by mouth 2 times daily for 5 days.  Dispense: 10 tablet; Refill: 0    2. Acute bronchitis, unspecified organism  - azithromycin (ZITHROMAX) 250 MG tablet; Take 2 tablets (500 mg) by mouth daily for 1 day, THEN 1 tablet (250 mg) daily for 9 days.  Dispense: 11 tablet; Refill: 0  - predniSONE (DELTASONE) 20 MG tablet; Take 20 mg by mouth 2 times daily for 5 days.  Dispense: 10 tablet; Refill: 0      Fluids  Rest  Temp control at home  Humidity at home - add bacteriostatic solution to humidifier  OTC Mucinex liquid cough and cold  Add Zicam or other zinc daily until you feel better  To ER or UC if symptoms increase  Return if you do not improve        Lynn Chau NP  [facility]

## 2019-02-25 ENCOUNTER — ANCILLARY PROCEDURE (OUTPATIENT)
Dept: GENERAL RADIOLOGY | Facility: OTHER | Age: 64
End: 2019-02-25
Attending: NURSE PRACTITIONER
Payer: COMMERCIAL

## 2019-02-25 DIAGNOSIS — Z09 FOLLOW UP: ICD-10-CM

## 2019-02-25 PROCEDURE — 71046 X-RAY EXAM CHEST 2 VIEWS: CPT | Mod: TC

## 2019-08-13 NOTE — PROGRESS NOTES
SUBJECTIVE:   CC: Jose C Corona is an 64 year old male who presents for preventive health visit.     Healthy Habits:    Do you get at least three servings of calcium containing foods daily (dairy, green leafy vegetables, etc.)? yes    Amount of exercise or daily activities, outside of work: 7 day(s) per week    Problems taking medications regularly No    Medication side effects: No    Have you had an eye exam in the past two years? yes    Do you see a dentist twice per year? yes    Do you have sleep apnea, excessive snoring or daytime drowsiness?no          Today's PHQ-2 Score:   PHQ-2 ( 1999 [employer]) 1/28/2019 9/2/2016   Q1: Little interest or pleasure in doing things 0 0   Q2: Feeling down, depressed or hopeless 0 0   PHQ-2 Score 0 0       Abuse: Current or Past(Physical, Sexual or Emotional)- No  Do you feel safe in your environment? Yes    Social History     Tobacco Use     Smoking status: Never Smoker     Smokeless tobacco: Never Used   Substance Use Topics     Alcohol use: Yes     Comment: weekends, beer     If you drink alcohol do you typically have >3 drinks per day or >7 drinks per week? No                      Last PSA:   PSA   Date Value Ref Range Status   04/27/2018 3.84 0 - 4 ug/L Final     Comment:     Assay Method:  Chemiluminescence using Siemens Vista analyzer     Jose C presents today for routine annual physical.  He is doing well and has no specific complaints today.  Both his brothers have a history of prostate cancer and 1 brother passed from this.  Jose C otherwise is doing fine.  He sees dermatology routinely.  He denies any chest pain, SALCIDO, or dizziness.       Reviewed orders with patient. Reviewed health maintenance and updated orders accordingly - Yes  Lab work is in process  Labs reviewed in EPIC  BP Readings from Last 3 Encounters:   08/20/19 133/74   01/28/19 124/84   11/13/18 126/74    Wt Readings from Last 3 Encounters:   08/20/19 93 kg (205 lb)   01/28/19 93.4 kg (206 lb)   11/13/18  94.8 kg (209 lb)                  Patient Active Problem List   Diagnosis     Mild intermittent asthma without complication     ACP (advance care planning)     Past Surgical History:   Procedure Laterality Date     CIRCUMCISION       COLONOSCOPY  2007    normal     COLONOSCOPY  06/2018    2 small polyps recheck 10 years per patient     EXCISE LESION FACE WITH FLAP PEDICLE Left 8/22/2018    Procedure: EXCISE LESION FACE WITH FLAP PEDICLE;  EXCISION LEFT NASAL BASAL CELL CARCINOMA WITH FROZENS AND FLAP REPAIR  1 x 0.6  3;  Surgeon: Yaneli Reeves MD;  Location: [facility]     ORTHOPEDIC SURGERY  12/2013    left knee meniscus repair       Social History     Tobacco Use     Smoking status: Never Smoker     Smokeless tobacco: Never Used   Substance Use Topics     Alcohol use: Yes     Comment: weekends, beer     Family History   Problem Relation Age of Onset     Cancer Mother 54        lung     Heart Disease Father 75        MI     Other Cancer Brother 65        PASSED FROM PROSTATE CANCER     Genitourinary Problems Brother         enlarged prostate     Prostate Cancer Brother          Current Outpatient Medications   Medication Sig Dispense Refill     fluticasone (FLOVENT HFA) 110 MCG/ACT inhaler INHALE 1 PUFF INTO THE LUNGS TWICE DAILY 12 g 11     albuterol (PROAIR HFA/PROVENTIL HFA/VENTOLIN HFA) 108 (90 Base) MCG/ACT inhaler Inhale 2 puffs into the lungs every 6 hours 1 Inhaler 5     Allergies   Allergen Reactions     Seasonal Allergies        Reviewed and updated as needed this visit by clinical staff         Reviewed and updated as needed this visit by Provider        Past Medical History:   Diagnosis Date     Mild intermittent asthma without complication 6/26/2013    Asthma action plan:  6/26/13      Mild persistent asthma 6/26/2013      Past Surgical History:   Procedure Laterality Date     CIRCUMCISION       COLONOSCOPY  2007    normal     COLONOSCOPY  06/2018    2 small polyps recheck 10 years per patient      EXCISE LESION FACE WITH FLAP PEDICLE Left 8/22/2018    Procedure: EXCISE LESION FACE WITH FLAP PEDICLE;  EXCISION LEFT NASAL BASAL CELL CARCINOMA WITH FROZENS AND FLAP REPAIR  1 x 0.6  3;  Surgeon: Yaneli Reeves MD;  Location: [facility]     ORTHOPEDIC SURGERY  12/2013    left knee meniscus repair       ROS:  CONSTITUTIONAL: NEGATIVE for fever, chills, change in weight  INTEGUMENTARY/SKIN: NEGATIVE for worrisome rashes, moles or lesions  EYES: NEGATIVE for vision changes or irritation  ENT: NEGATIVE for ear, mouth and throat problems  RESP: NEGATIVE for significant cough or SOB  CV: NEGATIVE for chest pain, palpitations or peripheral edema  GI: NEGATIVE for nausea, abdominal pain, heartburn, or change in bowel habits   male: negative for dysuria, hematuria, decreased urinary stream, erectile dysfunction, urethral discharge  MUSCULOSKELETAL: NEGATIVE for significant arthralgias or myalgia  NEURO: NEGATIVE for weakness, dizziness or paresthesias  HEME/ALLERGY/IMMUNE: NEGATIVE for bleeding problems  PSYCHIATRIC: NEGATIVE for changes in mood or affect    OBJECTIVE:   There were no vitals taken for this visit.  EXAM:  GENERAL: healthy, alert and no distress  EYES: Eyes grossly normal to inspection, PERRL and conjunctivae and sclerae normal  HENT: ear canals and TM's normal, nose and mouth without ulcers or lesions  NECK: no adenopathy, no asymmetry, masses, or scars and thyroid normal to palpation  RESP: lungs clear to auscultation - no rales, rhonchi or wheezes  CV: regular rate and rhythm, normal S1 S2, no S3 or S4, no murmur, click or rub, no peripheral edema and peripheral pulses strong  ABDOMEN: soft, nontender, no hepatosplenomegaly, no masses and bowel sounds normal  MS: no gross musculoskeletal defects noted, no edema  SKIN: no suspicious lesions or rashes  NEURO: Normal strength and tone, mentation intact and speech normal  PSYCH: mentation appears normal, affect normal/bright    Diagnostic Test  "Results:  Pending labs    EKG with LVH which dates back years for him.  Will discuss follow up Echo as he had this evaluated by Echo in the past.      ASSESSMENT/PLAN:   Jose C was seen today for physical.    Diagnoses and all orders for this visit:    Routine general medical examination at a health care facility  -     CBC with platelets and differential  -     Comprehensive metabolic panel (BMP + Alb, Alk Phos, ALT, AST, Total. Bili, TP)  -     EKG 12-lead complete w/read - (Clinic Performed)    Screening for hyperlipidemia  -     Lipid Profile (Chol, Trig, HDL, LDL calc)    Screening for prostate cancer  -     PSA, screen    Mild persistent asthma without complication  -     fluticasone (FLOVENT HFA) 110 MCG/ACT inhaler; INHALE 1 PUFF INTO THE LUNGS TWICE DAILY    Need for vaccination  -     SHINGRIX [86986]  -     1st  Administration  [[id number]]        COUNSELING:  Reviewed preventive health counseling, as reflected in patient instructions       Regular exercise       Healthy diet/nutrition       Prostate cancer screening    Estimated body mass index is 28.73 kg/m  as calculated from the following:    Height as of 11/13/18: 1.803 m (5' 11\").    Weight as of 1/28/19: 93.4 kg (206 lb).         reports that he has never smoked. He has never used smokeless tobacco.      Counseling Resources:  ATP IV Guidelines  Pooled Cohorts Equation Calculator  FRAX Risk Assessment  ICSI Preventive Guidelines  Dietary Guidelines for Americans, 2010  USDA's MyPlate  ASA Prophylaxis  Lung CA Screening    Rosalio Lee, DO  [facility] [place] [facility]  "

## 2019-08-20 ENCOUNTER — OFFICE VISIT (OUTPATIENT)
Dept: INTERNAL MEDICINE | Facility: OTHER | Age: 64
End: 2019-08-20
Attending: INTERNAL MEDICINE
Payer: COMMERCIAL

## 2019-08-20 VITALS
WEIGHT: 205 LBS | HEART RATE: 51 BPM | TEMPERATURE: 96.9 F | BODY MASS INDEX: 27.77 KG/M2 | DIASTOLIC BLOOD PRESSURE: 74 MMHG | OXYGEN SATURATION: 98 % | SYSTOLIC BLOOD PRESSURE: 133 MMHG | HEIGHT: 72 IN

## 2019-08-20 DIAGNOSIS — Z13.220 SCREENING FOR HYPERLIPIDEMIA: ICD-10-CM

## 2019-08-20 DIAGNOSIS — Z00.00 ROUTINE GENERAL MEDICAL EXAMINATION AT A HEALTH CARE FACILITY: Primary | ICD-10-CM

## 2019-08-20 DIAGNOSIS — J45.30 MILD PERSISTENT ASTHMA WITHOUT COMPLICATION: ICD-10-CM

## 2019-08-20 DIAGNOSIS — Z12.5 SCREENING FOR PROSTATE CANCER: ICD-10-CM

## 2019-08-20 DIAGNOSIS — Z23 NEED FOR VACCINATION: ICD-10-CM

## 2019-08-20 LAB
ALBUMIN SERPL-MCNC: 4 G/DL (ref 3.4–5)
ALP SERPL-CCNC: 102 U/L (ref 40–150)
ALT SERPL W P-5'-P-CCNC: 41 U/L (ref 0–70)
ANION GAP SERPL CALCULATED.3IONS-SCNC: 7 MMOL/L (ref 3–14)
AST SERPL W P-5'-P-CCNC: 24 U/L (ref 0–45)
BASOPHILS # BLD AUTO: 0.1 10E9/L (ref 0–0.2)
BASOPHILS NFR BLD AUTO: 1.1 %
BILIRUB SERPL-MCNC: 0.7 MG/DL (ref 0.2–1.3)
BUN SERPL-MCNC: 10 MG/DL (ref 7–30)
CALCIUM SERPL-MCNC: 8.8 MG/DL (ref 8.5–10.1)
CHLORIDE SERPL-SCNC: 106 MMOL/L (ref 94–109)
CHOLEST SERPL-MCNC: 189 MG/DL
CO2 SERPL-SCNC: 26 MMOL/L (ref 20–32)
CREAT SERPL-MCNC: 0.87 MG/DL (ref 0.66–1.25)
DIFFERENTIAL METHOD BLD: NORMAL
EOSINOPHIL # BLD AUTO: 0.3 10E9/L (ref 0–0.7)
EOSINOPHIL NFR BLD AUTO: 5.8 %
ERYTHROCYTE [DISTWIDTH] IN BLOOD BY AUTOMATED COUNT: 12.8 % (ref 10–15)
GFR SERPL CREATININE-BSD FRML MDRD: >90 ML/MIN/{1.73_M2}
GLUCOSE SERPL-MCNC: 90 MG/DL (ref 70–99)
HCT VFR BLD AUTO: 43.2 % (ref 40–53)
HDLC SERPL-MCNC: 85 MG/DL
HGB BLD-MCNC: 14.9 G/DL (ref 13.3–17.7)
LDLC SERPL CALC-MCNC: 85 MG/DL
LYMPHOCYTES # BLD AUTO: 1.8 10E9/L (ref 0.8–5.3)
LYMPHOCYTES NFR BLD AUTO: 32.2 %
MCH RBC QN AUTO: 31.9 PG (ref 26.5–33)
MCHC RBC AUTO-ENTMCNC: 34.5 G/DL (ref 31.5–36.5)
MCV RBC AUTO: 93 FL (ref 78–100)
MONOCYTES # BLD AUTO: 0.5 10E9/L (ref 0–1.3)
MONOCYTES NFR BLD AUTO: 8 %
NEUTROPHILS # BLD AUTO: 3 10E9/L (ref 1.6–8.3)
NEUTROPHILS NFR BLD AUTO: 52.9 %
NONHDLC SERPL-MCNC: 104 MG/DL
PLATELET # BLD AUTO: 297 10E9/L (ref 150–450)
POTASSIUM SERPL-SCNC: 4.1 MMOL/L (ref 3.4–5.3)
PROT SERPL-MCNC: 7.6 G/DL (ref 6.8–8.8)
PSA SERPL-ACNC: 4.82 UG/L (ref 0–4)
RBC # BLD AUTO: 4.67 10E12/L (ref 4.4–5.9)
SODIUM SERPL-SCNC: 139 MMOL/L (ref 133–144)
TRIGL SERPL-MCNC: 97 MG/DL
WBC # BLD AUTO: 5.7 10E9/L (ref 4–11)

## 2019-08-20 PROCEDURE — G0103 PSA SCREENING: HCPCS | Performed by: INTERNAL MEDICINE

## 2019-08-20 PROCEDURE — 36415 COLL VENOUS BLD VENIPUNCTURE: CPT | Performed by: INTERNAL MEDICINE

## 2019-08-20 PROCEDURE — 80053 COMPREHEN METABOLIC PANEL: CPT | Performed by: INTERNAL MEDICINE

## 2019-08-20 PROCEDURE — 80061 LIPID PANEL: CPT | Performed by: INTERNAL MEDICINE

## 2019-08-20 PROCEDURE — 90471 IMMUNIZATION ADMIN: CPT | Performed by: INTERNAL MEDICINE

## 2019-08-20 PROCEDURE — 85025 COMPLETE CBC W/AUTO DIFF WBC: CPT | Performed by: INTERNAL MEDICINE

## 2019-08-20 PROCEDURE — 90750 HZV VACC RECOMBINANT IM: CPT | Performed by: INTERNAL MEDICINE

## 2019-08-20 PROCEDURE — 93000 ELECTROCARDIOGRAM COMPLETE: CPT | Performed by: INTERNAL MEDICINE

## 2019-08-20 PROCEDURE — 99396 PREV VISIT EST AGE 40-64: CPT | Mod: 25 | Performed by: INTERNAL MEDICINE

## 2019-08-20 RX ORDER — FLUTICASONE PROPIONATE 110 UG/1
AEROSOL, METERED RESPIRATORY (INHALATION)
Qty: 12 G | Refills: 11 | Status: SHIPPED | OUTPATIENT
Start: 2019-08-20 | End: 2020-08-06

## 2019-08-20 ASSESSMENT — ANXIETY QUESTIONNAIRES
1. FEELING NERVOUS, ANXIOUS, OR ON EDGE: NOT AT ALL
3. WORRYING TOO MUCH ABOUT DIFFERENT THINGS: NOT AT ALL
5. BEING SO RESTLESS THAT IT IS HARD TO SIT STILL: NOT AT ALL
GAD7 TOTAL SCORE: 0
7. FEELING AFRAID AS IF SOMETHING AWFUL MIGHT HAPPEN: NOT AT ALL
2. NOT BEING ABLE TO STOP OR CONTROL WORRYING: NOT AT ALL
6. BECOMING EASILY ANNOYED OR IRRITABLE: NOT AT ALL
4. TROUBLE RELAXING: NOT AT ALL

## 2019-08-20 ASSESSMENT — PAIN SCALES - GENERAL: PAINLEVEL: NO PAIN (0)

## 2019-08-20 ASSESSMENT — PATIENT HEALTH QUESTIONNAIRE - PHQ9: SUM OF ALL RESPONSES TO PHQ QUESTIONS 1-9: 0

## 2019-08-20 ASSESSMENT — MIFFLIN-ST. JEOR: SCORE: 1759.93

## 2019-08-20 NOTE — NURSING NOTE
"Chief Complaint   Patient presents with     Physical       Initial /74 (BP Location: Left arm, Patient Position: Chair, Cuff Size: Adult Regular)   Pulse 51   Temp 96.9  F (36.1  C) (Tympanic)   Ht 1.803 m (5' 11\")   Wt 93 kg (205 lb)   SpO2 98%   BMI 28.59 kg/m   Estimated body mass index is 28.59 kg/m  as calculated from the following:    Height as of this encounter: 1.803 m (5' 11\").    Weight as of this encounter: 93 kg (205 lb).  Medication Reconciliation: complete  "

## 2019-08-21 DIAGNOSIS — R94.31 NONSPECIFIC ABNORMAL ELECTROCARDIOGRAM (ECG) (EKG): Primary | ICD-10-CM

## 2019-08-21 DIAGNOSIS — R97.20 ELEVATED PROSTATE SPECIFIC ANTIGEN (PSA): ICD-10-CM

## 2019-08-21 ASSESSMENT — ANXIETY QUESTIONNAIRES: GAD7 TOTAL SCORE: 0

## 2019-08-26 ENCOUNTER — HOSPITAL ENCOUNTER (OUTPATIENT)
Dept: CARDIOLOGY | Facility: HOSPITAL | Age: 64
Discharge: HOME | End: 2019-08-26
Attending: INTERNAL MEDICINE | Admitting: INTERNAL MEDICINE
Payer: COMMERCIAL

## 2019-08-26 DIAGNOSIS — R94.31 NONSPECIFIC ABNORMAL ELECTROCARDIOGRAM (ECG) (EKG): ICD-10-CM

## 2019-08-26 PROCEDURE — 93306 TTE W/DOPPLER COMPLETE: CPT | Mod: 26 | Performed by: INTERNAL MEDICINE

## 2019-08-26 PROCEDURE — 93306 TTE W/DOPPLER COMPLETE: CPT | Mod: TC

## 2019-09-11 ENCOUNTER — OFFICE VISIT (OUTPATIENT)
Dept: UROLOGY | Facility: OTHER | Age: 64
End: 2019-09-11
Attending: INTERNAL MEDICINE
Payer: COMMERCIAL

## 2019-09-11 VITALS
OXYGEN SATURATION: 98 % | RESPIRATION RATE: 16 BRPM | SYSTOLIC BLOOD PRESSURE: 130 MMHG | TEMPERATURE: 96.9 F | HEART RATE: 74 BPM | DIASTOLIC BLOOD PRESSURE: 82 MMHG

## 2019-09-11 DIAGNOSIS — R97.20 ELEVATED PSA: Primary | ICD-10-CM

## 2019-09-11 DIAGNOSIS — R97.20 ELEVATED PROSTATE SPECIFIC ANTIGEN (PSA): ICD-10-CM

## 2019-09-11 PROCEDURE — 99244 OFF/OP CNSLTJ NEW/EST MOD 40: CPT | Performed by: UROLOGY

## 2019-09-11 RX ORDER — CIPROFLOXACIN 500 MG/1
TABLET, FILM COATED ORAL
Qty: 6 TABLET | Refills: 0 | Status: SHIPPED | OUTPATIENT
Start: 2019-09-11 | End: 2020-01-21

## 2019-09-11 ASSESSMENT — PAIN SCALES - GENERAL: PAINLEVEL: NO PAIN (0)

## 2019-09-11 NOTE — NURSING NOTE
"Chief Complaint   Patient presents with     Elevated PSA     Per Dr Jones       Initial /82   Pulse 74   Temp 96.9  F (36.1  C) (Tympanic)   Resp 16   SpO2 98%  Estimated body mass index is 28.19 kg/m  as calculated from the following:    Height as of 8/20/19: 1.816 m (5' 11.5\").    Weight as of 8/20/19: 93 kg (205 lb).  Medication Reconciliation: complete   Review of Systems:    Weight loss:    No     Recent fever/chills:  No   Night sweats:   No  Current skin rash:  No   Recent hair loss:  No  Heat intolerance:  No   Cold intolerance:  No  Chest pain:   No   Palpitations:   No  Shortness of breath:  No   Wheezing:   yes  Constipation:    No   Diarrhea:   No   Nausea:   No   Vomiting:   No   Kidney/side pain:  No   Back pain:   yes  Frequent headaches:  No   Dizziness:     No  Leg swelling:   No   Calf pain:    No        "

## 2019-09-11 NOTE — PROGRESS NOTES
I was asked to see this patient by Dr Lee and provide my opinion about the following:  Elevated PSA    Type of Visit  Consult    Chief Complaint  Elevated PSA    HPI  Mr. Corona is a 64 year old male who presents with an elevated PSA.  He does have a family history of prostate cancer: brother diagnosed at 62 and dies from prostate cancer at 65, and another brother diagnosed at age 70.  The patient has not previously undergone prostate biopsy.  No associated worsening LUTS, dysuria or prostatitis at the time of the PSA.  The most recent PSA was collected 3 weeks ago.      Past Medical History  He  has a past medical history of Mild intermittent asthma without complication (6/26/2013) and Mild persistent asthma (6/26/2013).  Patient Active Problem List   Diagnosis     Mild intermittent asthma without complication     ACP (advance care planning)       Past Surgical History  He  has a past surgical history that includes Circumcision; orthopedic surgery (12/2013); colonoscopy (2007); colonoscopy (06/2018); and Excise lesion face with flap pedicle (Left, 8/22/2018).    Medications  He has a current medication list which includes the following prescription(s): albuterol and fluticasone.    Allergies  Allergies   Allergen Reactions     Seasonal Allergies      Social History  He  reports that he has never smoked. He has never used smokeless tobacco. He reports that he drinks alcohol. He reports that he does not use drugs.  No drug abuse.    Family History  Family History   Problem Relation Age of Onset     Cancer Mother 54        lung     Heart Disease Father 75        MI     Other Cancer Brother 65        PASSED FROM PROSTATE CANCER     Genitourinary Problems Brother         enlarged prostate     Prostate Cancer Brother        Review of Systems  I personally reviewed the ROS with the patient.    Nursing Notes:   Samia Zepeda LPN  9/11/2019  8:43 AM  Incomplete  Chief Complaint   Patient presents with      "Elevated PSA     Per Dr Jones       Initial /82   Pulse 74   Temp 96.9  F (36.1  C) (Tympanic)   Resp 16   SpO2 98%  Estimated body mass index is 28.19 kg/m  as calculated from the following:    Height as of 8/20/19: 1.816 m (5' 11.5\").    Weight as of 8/20/19: 93 kg (205 lb).  Medication Reconciliation: complete   Review of Systems:    Weight loss:    No     Recent fever/chills:  No   Night sweats:   No  Current skin rash:  No   Recent hair loss:  No  Heat intolerance:  No   Cold intolerance:  No  Chest pain:   No   Palpitations:   No  Shortness of breath:  No   Wheezing:   yes  Constipation:    No   Diarrhea:   No   Nausea:   No   Vomiting:   No   Kidney/side pain:  No   Back pain:   yes  Frequent headaches:  No   Dizziness:     No  Leg swelling:   No   Calf pain:    No          Physical Exam  Vitals:    09/11/19 0839   BP: 130/82   Pulse: 74   Resp: 16   Temp: 96.9  F (36.1  C)   TempSrc: Tympanic   SpO2: 98%   Constitutional: No acute distress.  Alert and cooperative   Head: NCAT  Eyes: Conjunctivae normal  Cardiovascular: Regular rate.  Pulmonary/Chest: Respirations are even and non-labored bilaterally, no audible wheezing  Abdominal: Soft. No distension, tenderness, masses or guarding.   Neurological: A + O x 3.  Cranial Nerves II-XII grossly intact.  Extremities: TAMRA x 4, Warm. No clubbing.  No cyanosis.    Skin: Pink, warm and dry.  No visible rashes noted.  Psychiatric:  Normal mood and affect  Back:  No left CVA tenderness.  No right CVA tenderness.  Genitourinary:  Nonpalpable bladder    Labs  Results for ZAN LOPEZ (MRN [id number]) as of 9/11/2019 08:43   1/8/2015 11:05 9/1/2016 15:15 4/27/2018 15:41 8/20/2019 14:27   PSA 3.37 3.78 3.84 4.82 (H)     Assessment  Mr. Lopez is a 64 year old male who presents with elevated PSA.  Strong family history of prostate cancer.    Discussed the risks of biopsy leading to overdiagnosis and overtreatment.    We discussed options regarding the " elevated PSA including continuing to check serial levels, Select MDx urine testing to further stratify his personal risk level, MRI of the prostate, prostate ultrasound.    We discussed the risks of biopsy possibly leading to over diagnosis and over treatment.    I explained to the patient that an elevated PSA is a marker of risk of prostate cancer and a prostate biopsy would be the next step in diagnosis.  I explained that sampling error can occur with any biopsy and there is a risk of potentially missing a cancer that may be present.    I discussed the risks, benefits, and alternatives to prostate biopsy, including hematuria, hematochezia, and hematospermia.  I also discussed the risk of diagnosing a clinically-insignificant prostate cancer.  I discussed the risks of sepsis, which can be minimized by prophylactic antibiotics.     Plan  Recommended TRUS biopsy of prostate in the OR wit MAC sedation   Gentamicin 120mg IM prior to the biopsy.   Ciprofloxacin 500mg po bid for 3 days to start the day prior to biopsy.   Patient denies taking antiplatelets or anticoagulant medication.

## 2019-09-12 NOTE — H&P (VIEW-ONLY)
[facility]  [address]  [place] [address]  [phone]  Dept: [phone]    PRE-OP EVALUATION:  Today's date: 2019    Jose C Corona (: 1955) presents for pre-operative evaluation assessment as requested by Dr. Williamson.  He requires evaluation and anesthesia risk assessment prior to undergoing surgery/procedure for treatment of prostate  .    Proposed Surgery/ Procedure: Transrectal US Guided Biopty of Prostate   Date of Surgery/ Procedure: 10/08/2019  Time of Surgery/ Procedure: [facility]/Surgical Facility:[facility]   Primary Physician: Rosalio Lee  Type of Anesthesia Anticipated: to be determined    Patient has a Health Care Directive or Living Will:  YES on file    1. NO - Do you have a history of heart attack, stroke, stent, bypass or surgery on an artery in the head, neck, heart or legs?  2. NO - Do you ever have any pain or discomfort in your chest?  3. NO - Do you have a history of  Heart Failure?  4. NO - Are you troubled by shortness of breath when: walking on the level, up a slight hill or at night?  5. NO - Do you currently have a cold, bronchitis or other respiratory infection?  6. NO - Do you have a cough, shortness of breath or wheezing?  7. NO - Do you sometimes get pains in the calves of your legs when you walk?  8. NO - Do you or anyone in your family have previous history of blood clots?  9. NO - Do you or does anyone in your family have a serious bleeding problem such as prolonged bleeding following surgeries or cuts?  10. NO - Have you ever had problems with anemia or been told to take iron pills?  11. NO - Have you had any abnormal blood loss such as black, tarry or bloody stools, or abnormal vaginal bleeding?  12. NO - Have you ever had a blood transfusion?  13. NO - Have you or any of your relatives ever had problems with anesthesia?  14. NO - Do you have sleep apnea, excessive snoring or daytime drowsiness?  15.  NO - Do you have any prosthetic heart valves?  16. NO - Do you have prosthetic joints?  17. NO - Is there any chance that you may be pregnant?      HPI:     HPI related to upcoming procedure: Prostate Bx      ASTHMA - Patient has a longstanding history of moderate-severe Asthma . Patient has been doing well overall noting WHEEZING and continues on medication regimen consisting of inhalers without adverse reactions or side effects.       MEDICAL HISTORY:     Patient Active Problem List    Diagnosis Date Noted     ACP (advance care planning) 09/02/2016     Priority: Medium     Advance Care Planning 11/11/2016: Receipt of ACP document:  Received: Health Care Directive which was witnessed or notarized on 8-12-16.  Document previously scanned on 9-7-16.  Validation form completed and sent to be scanned.  Code Status reflects choices in most recent ACP document.  Confirmed/documented designated decision maker(s).  Added by Leann Diego RN Advance Care Planning Liaison with Carlito Mcgee  Advance Care Planning 9/14/2016: ACP Review of Chart / Resources Provided:  Reviewed chart for advance care plan.  Jose C Corona has an up to date advance care plan on file.  Added by Alyssa Gill  Advance Care Planning 9/2/2016: ACP Review of Chart / Resources Provided:  Reviewed chart for advance care plan.  Jose C ESPAÑA Marpam has been provided information and resources to begin or update their advance care plan.  Added by Alyssa Gill             Mild intermittent asthma without complication 06/26/2013     Priority: Medium     Asthma action plan:  6/26/13        Past Medical History:   Diagnosis Date     Mild intermittent asthma without complication 6/26/2013    Asthma action plan:  6/26/13      Mild persistent asthma 6/26/2013     Past Surgical History:   Procedure Laterality Date     CIRCUMCISION       COLONOSCOPY  2007    normal     COLONOSCOPY  06/2018    2 small polyps recheck 10 years per patient     EXCISE LESION FACE  WITH FLAP PEDICLE Left 8/22/2018    Procedure: EXCISE LESION FACE WITH FLAP PEDICLE;  EXCISION LEFT NASAL BASAL CELL CARCINOMA WITH FROZENS AND FLAP REPAIR  1 x 0.6  3;  Surgeon: Yaneli Reeves MD;  Location: [facility] OR     ORTHOPEDIC SURGERY  12/2013    left knee meniscus repair     Current Outpatient Medications   Medication Sig Dispense Refill     albuterol (PROAIR HFA/PROVENTIL HFA/VENTOLIN HFA) 108 (90 Base) MCG/ACT inhaler Inhale 2 puffs into the lungs every 6 hours 1 Inhaler 5     ciprofloxacin (CIPRO) 500 MG tablet Take one tablet twice daily, start the day before the biopsy and continue x 3 days 6 tablet 0     fluticasone (FLOVENT HFA) 110 MCG/ACT inhaler INHALE 1 PUFF INTO THE LUNGS TWICE DAILY 12 g 11     OTC products: None, except as noted above    Allergies   Allergen Reactions     Seasonal Allergies       Latex Allergy: NO    Social History     Tobacco Use     Smoking status: Never Smoker     Smokeless tobacco: Never Used   Substance Use Topics     Alcohol use: Yes     Comment: weekends, beer     History   Drug Use No       REVIEW OF SYSTEMS:   Constitutional, neuro, ENT, endocrine, pulmonary, cardiac, gastrointestinal, genitourinary, musculoskeletal, integument and psychiatric systems are negative, except as otherwise noted.    EXAM:   There were no vitals taken for this visit.    GENERAL APPEARANCE: healthy, alert and no distress     EYES: EOMI,  PERRL     HENT: ear canals and TM's normal and nose and mouth without ulcers or lesions     NECK: no adenopathy, no asymmetry, masses, or scars and thyroid normal to palpation     RESP: lungs clear to auscultation - no rales, rhonchi or wheezes     CV: regular rates and rhythm, normal S1 S2, no S3 or S4 and no murmur, click or rub     ABDOMEN:  soft, nontender, no HSM or masses and bowel sounds normal     MS: extremities normal- no gross deformities noted, no evidence of inflammation in joints, FROM in all extremities.     SKIN: no suspicious lesions or  rashes     NEURO: Normal strength and tone, sensory exam grossly normal, mentation intact and speech normal     PSYCH: mentation appears normal. and affect normal/bright     LYMPHATICS: No cervical adenopathy    DIAGNOSTICS:     EKG: sinus bradycardia, EKG criteria for LVH but no LVH on recent Echo      Labs Resulted Today:   Results for orders placed or performed during the hospital encounter of 19   Echocardiogram Complete    Narrative    [id number]  [id number]  [id number]  [id number]^HANANE^ARSLAN^P           [facility]  Echocardiography Laboratory  [address] [place] [id number]     Name: ZAN LOPEZ  MRN: [id number]  : 1955  Study Date: 2019 08:47 AM  Age: 64 yrs  Gender: Male  Patient Location: [facility]  Reason For Study: Nonspecific abnormal electrocardiogram (ECG) (EKG)  History: abnormal ekg  Ordering Physician: ARSLAN KOO  Referring Physician: ARSLAN KOO  Performed By: Lenora Cherry     BSA: 2.1 m2  Height: 71 in  Weight: 203 lb  _____________________________________________________________________________  __     _____________________________________________________________________________  __        Interpretation Summary  No pericardial effusion is present.  Global and regional left ventricular function is normal with an EF of 60-65%.  Grade I or early diastolic dysfunction.  The right ventricle is normal size.  Both atria appear normal.  The mitral valve is normal.  Trace to mild aortic insufficiency is present.  The tricuspid valve is normal.  The pulmonic valve is normal.  The aorta root is normal.  _____________________________________________________________________________  __        Left Ventricle  Global and regional left ventricular function is normal with an EF of 60-65%.  Grade I or early diastolic dysfunction.     Right Ventricle  The right ventricle is normal size.     Atria  Both atria appear normal.     Mitral  Valve  The mitral valve is normal.     Aortic Valve  Trace to mild aortic insufficiency is present.        Tricuspid Valve  The tricuspid valve is normal.     Pulmonic Valve  The pulmonic valve is normal.     Vessels  The aorta root is normal.     Pericardium  No pericardial effusion is present.     _____________________________________________________________________________  __  MMode/2D Measurements & Calculations  IVSd: 0.94 cm  IVSs: 1.5 cm  LVIDd: 5.5 cm  LVIDs: 3.5 cm  LVPWd: 0.84 cm  LVPWs: 1.4 cm  FS: 37.7 %  LV mass(C)d: 185.9 grams  LV mass(C)dI: 87.6 grams/m2  LV mass(C)sI: 81.3 grams/m2  Ao root diam: 3.6 cm  LA dimension: 3.0 cm  LA/Ao: 0.83  LVOT diam: 2.6 cm  LVOT area: 5.1 cm2     RWT: 0.30     Time Measurements  Pulm. HR: 214.8 BPM     Doppler Measurements & Calculations  MV E max santy: 53.9 cm/sec  MV A max santy: 75.5 cm/sec  MV E/A: 0.71  MV dec slope: 185.5 cm/sec2  MV dec time: 0.29 sec  Ao V2 max: 116.2 cm/sec  Ao max P.4 mmHg  Ao V2 mean: 80.7 cm/sec  Ao mean PG: 3.1 mmHg  Ao V2 VTI: 24.2 cm  SHIMA(I,D): 4.7 cm2  SHIMA(V,D): 4.1 cm2  LV V1 max PG: 3.4 mmHg  LV V1 max: 92.5 cm/sec  LV V1 VTI: 22.3 cm  CO(LVOT): 23.0 l/min  CI(LVOT): 10.8 l/min/m2  SV(LVOT): 114.9 ml  SI(LVOT): 54.1 ml/m2  PA V2 max: 99.2 cm/sec  PA max PG: 3.9 mmHg  PA mean P.2 mmHg  PA V2 VTI: 19.0 cm  AV Santy Ratio (DI): 0.80  SHIMA Index (cm2/m2): 2.2  E/E': 6.0  Peak E' Santy: 8.9 cm/sec        _____________________________________________________________________________  __           Report approved by: Roseanna Diallo 2019 08:00 AM          Recent Labs   Lab Test 19  1427 18  1408 16  1515 13  1401   HGB 14.9 15.0 14.8 14.3    258 289 261   INR  --   --   --  1.0     --  140 139   POTASSIUM 4.1  --  4.6 4.0   CR 0.87  --  0.98 1.10      CBC RESULTS:   Recent Labs   Lab Test 19  1427   WBC 5.7   RBC 4.67   HGB 14.9   HCT 43.2   MCV 93   MCH 31.9   MCHC 34.5   RDW 12.8         Last Comprehensive Metabolic Panel:  Sodium   Date Value Ref Range Status   08/20/2019 139 133 - 144 mmol/L Final     Potassium   Date Value Ref Range Status   08/20/2019 4.1 3.4 - 5.3 mmol/L Final     Chloride   Date Value Ref Range Status   08/20/2019 106 94 - 109 mmol/L Final     Carbon Dioxide   Date Value Ref Range Status   08/20/2019 26 20 - 32 mmol/L Final     Anion Gap   Date Value Ref Range Status   08/20/2019 7 3 - 14 mmol/L Final     Glucose   Date Value Ref Range Status   08/20/2019 90 70 - 99 mg/dL Final     Comment:     Non Fasting     Urea Nitrogen   Date Value Ref Range Status   08/20/2019 10 7 - 30 mg/dL Final     Creatinine   Date Value Ref Range Status   08/20/2019 0.87 0.66 - 1.25 mg/dL Final     GFR Estimate   Date Value Ref Range Status   08/20/2019 >90 >60 mL/min/[1.73_m2] Final     Comment:     Non  GFR Calc  Starting 12/18/2018, serum creatinine based estimated GFR (eGFR) will be   calculated using the Chronic Kidney Disease Epidemiology Collaboration   (CKD-EPI) equation.       Calcium   Date Value Ref Range Status   08/20/2019 8.8 8.5 - 10.1 mg/dL Final     IMPRESSION:   Reason for surgery/procedure: elevated PSA    The proposed surgical procedure is considered LOW risk.    REVISED CARDIAC RISK INDEX  The patient has the following serious cardiovascular risks for perioperative complications such as (MI, PE, VFib and 3  AV Block):  No serious cardiac risks  INTERPRETATION: 0 risks: Class I (very low risk - 0.4% complication rate)    The patient has the following additional risks for perioperative complications:  No identified additional risks  The 10-year ASCVD risk score (Pranav DOV Jr., et al., 2013) is: 7.7%    Values used to calculate the score:      Age: 64 years      Sex: Male      Is Non- : No      Diabetic: No      Tobacco smoker: No      Systolic Blood Pressure: 138 mmHg      Is BP treated: No      HDL Cholesterol: 85 mg/dL       Total Cholesterol: 189 mg/dL    No diagnosis found.    RECOMMENDATIONS:         APPROVAL GIVEN to proceed with proposed procedure, without further diagnostic evaluation       Signed Electronically by: Rosalio Lee DO    Copy of this evaluation report is provided to requesting physician.    [facility] Preop Guidelines    Revised Cardiac Risk Index

## 2019-09-12 NOTE — PROGRESS NOTES
[facility]  [address]  [place] [address]  [phone]  Dept: [phone]    PRE-OP EVALUATION:  Today's date: 2019    Jose C Corona (: 1955) presents for pre-operative evaluation assessment as requested by Dr. Williamson.  He requires evaluation and anesthesia risk assessment prior to undergoing surgery/procedure for treatment of prostate  .    Proposed Surgery/ Procedure: Transrectal US Guided Biopty of Prostate   Date of Surgery/ Procedure: 10/08/2019  Time of Surgery/ Procedure: [facility]/Surgical Facility:[facility]   Primary Physician: Rosalio Lee  Type of Anesthesia Anticipated: to be determined    Patient has a Health Care Directive or Living Will:  YES on file    1. NO - Do you have a history of heart attack, stroke, stent, bypass or surgery on an artery in the head, neck, heart or legs?  2. NO - Do you ever have any pain or discomfort in your chest?  3. NO - Do you have a history of  Heart Failure?  4. NO - Are you troubled by shortness of breath when: walking on the level, up a slight hill or at night?  5. NO - Do you currently have a cold, bronchitis or other respiratory infection?  6. NO - Do you have a cough, shortness of breath or wheezing?  7. NO - Do you sometimes get pains in the calves of your legs when you walk?  8. NO - Do you or anyone in your family have previous history of blood clots?  9. NO - Do you or does anyone in your family have a serious bleeding problem such as prolonged bleeding following surgeries or cuts?  10. NO - Have you ever had problems with anemia or been told to take iron pills?  11. NO - Have you had any abnormal blood loss such as black, tarry or bloody stools, or abnormal vaginal bleeding?  12. NO - Have you ever had a blood transfusion?  13. NO - Have you or any of your relatives ever had problems with anesthesia?  14. NO - Do you have sleep apnea, excessive snoring or daytime drowsiness?  15.  NO - Do you have any prosthetic heart valves?  16. NO - Do you have prosthetic joints?  17. NO - Is there any chance that you may be pregnant?      HPI:     HPI related to upcoming procedure: Prostate Bx      ASTHMA - Patient has a longstanding history of moderate-severe Asthma . Patient has been doing well overall noting WHEEZING and continues on medication regimen consisting of inhalers without adverse reactions or side effects.       MEDICAL HISTORY:     Patient Active Problem List    Diagnosis Date Noted     ACP (advance care planning) 09/02/2016     Priority: Medium     Advance Care Planning 11/11/2016: Receipt of ACP document:  Received: Health Care Directive which was witnessed or notarized on 8-12-16.  Document previously scanned on 9-7-16.  Validation form completed and sent to be scanned.  Code Status reflects choices in most recent ACP document.  Confirmed/documented designated decision maker(s).  Added by Leann Diego RN Advance Care Planning Liaison with Carlito Mcgee  Advance Care Planning 9/14/2016: ACP Review of Chart / Resources Provided:  Reviewed chart for advance care plan.  Jose C Corona has an up to date advance care plan on file.  Added by Alyssa Gill  Advance Care Planning 9/2/2016: ACP Review of Chart / Resources Provided:  Reviewed chart for advance care plan.  Jose C ESPAÑA Marpam has been provided information and resources to begin or update their advance care plan.  Added by Alyssa Gill             Mild intermittent asthma without complication 06/26/2013     Priority: Medium     Asthma action plan:  6/26/13        Past Medical History:   Diagnosis Date     Mild intermittent asthma without complication 6/26/2013    Asthma action plan:  6/26/13      Mild persistent asthma 6/26/2013     Past Surgical History:   Procedure Laterality Date     CIRCUMCISION       COLONOSCOPY  2007    normal     COLONOSCOPY  06/2018    2 small polyps recheck 10 years per patient     EXCISE LESION FACE  WITH FLAP PEDICLE Left 8/22/2018    Procedure: EXCISE LESION FACE WITH FLAP PEDICLE;  EXCISION LEFT NASAL BASAL CELL CARCINOMA WITH FROZENS AND FLAP REPAIR  1 x 0.6  3;  Surgeon: Yaneli Reeves MD;  Location: [facility] OR     ORTHOPEDIC SURGERY  12/2013    left knee meniscus repair     Current Outpatient Medications   Medication Sig Dispense Refill     albuterol (PROAIR HFA/PROVENTIL HFA/VENTOLIN HFA) 108 (90 Base) MCG/ACT inhaler Inhale 2 puffs into the lungs every 6 hours 1 Inhaler 5     ciprofloxacin (CIPRO) 500 MG tablet Take one tablet twice daily, start the day before the biopsy and continue x 3 days 6 tablet 0     fluticasone (FLOVENT HFA) 110 MCG/ACT inhaler INHALE 1 PUFF INTO THE LUNGS TWICE DAILY 12 g 11     OTC products: None, except as noted above    Allergies   Allergen Reactions     Seasonal Allergies       Latex Allergy: NO    Social History     Tobacco Use     Smoking status: Never Smoker     Smokeless tobacco: Never Used   Substance Use Topics     Alcohol use: Yes     Comment: weekends, beer     History   Drug Use No       REVIEW OF SYSTEMS:   Constitutional, neuro, ENT, endocrine, pulmonary, cardiac, gastrointestinal, genitourinary, musculoskeletal, integument and psychiatric systems are negative, except as otherwise noted.    EXAM:   There were no vitals taken for this visit.    GENERAL APPEARANCE: healthy, alert and no distress     EYES: EOMI,  PERRL     HENT: ear canals and TM's normal and nose and mouth without ulcers or lesions     NECK: no adenopathy, no asymmetry, masses, or scars and thyroid normal to palpation     RESP: lungs clear to auscultation - no rales, rhonchi or wheezes     CV: regular rates and rhythm, normal S1 S2, no S3 or S4 and no murmur, click or rub     ABDOMEN:  soft, nontender, no HSM or masses and bowel sounds normal     MS: extremities normal- no gross deformities noted, no evidence of inflammation in joints, FROM in all extremities.     SKIN: no suspicious lesions or  rashes     NEURO: Normal strength and tone, sensory exam grossly normal, mentation intact and speech normal     PSYCH: mentation appears normal. and affect normal/bright     LYMPHATICS: No cervical adenopathy    DIAGNOSTICS:     EKG: sinus bradycardia, EKG criteria for LVH but no LVH on recent Echo      Labs Resulted Today:   Results for orders placed or performed during the hospital encounter of 19   Echocardiogram Complete    Narrative    [id number]  [id number]  [id number]  [id number]^HANANE^ARSLAN^P           [facility]  Echocardiography Laboratory  [address] [place] [id number]     Name: ZAN LOPEZ  MRN: [id number]  : 1955  Study Date: 2019 08:47 AM  Age: 64 yrs  Gender: Male  Patient Location: [facility]  Reason For Study: Nonspecific abnormal electrocardiogram (ECG) (EKG)  History: abnormal ekg  Ordering Physician: ARSLAN KOO  Referring Physician: ARSLAN KOO  Performed By: Lenora Cherry     BSA: 2.1 m2  Height: 71 in  Weight: 203 lb  _____________________________________________________________________________  __     _____________________________________________________________________________  __        Interpretation Summary  No pericardial effusion is present.  Global and regional left ventricular function is normal with an EF of 60-65%.  Grade I or early diastolic dysfunction.  The right ventricle is normal size.  Both atria appear normal.  The mitral valve is normal.  Trace to mild aortic insufficiency is present.  The tricuspid valve is normal.  The pulmonic valve is normal.  The aorta root is normal.  _____________________________________________________________________________  __        Left Ventricle  Global and regional left ventricular function is normal with an EF of 60-65%.  Grade I or early diastolic dysfunction.     Right Ventricle  The right ventricle is normal size.     Atria  Both atria appear normal.     Mitral  Valve  The mitral valve is normal.     Aortic Valve  Trace to mild aortic insufficiency is present.        Tricuspid Valve  The tricuspid valve is normal.     Pulmonic Valve  The pulmonic valve is normal.     Vessels  The aorta root is normal.     Pericardium  No pericardial effusion is present.     _____________________________________________________________________________  __  MMode/2D Measurements & Calculations  IVSd: 0.94 cm  IVSs: 1.5 cm  LVIDd: 5.5 cm  LVIDs: 3.5 cm  LVPWd: 0.84 cm  LVPWs: 1.4 cm  FS: 37.7 %  LV mass(C)d: 185.9 grams  LV mass(C)dI: 87.6 grams/m2  LV mass(C)sI: 81.3 grams/m2  Ao root diam: 3.6 cm  LA dimension: 3.0 cm  LA/Ao: 0.83  LVOT diam: 2.6 cm  LVOT area: 5.1 cm2     RWT: 0.30     Time Measurements  Pulm. HR: 214.8 BPM     Doppler Measurements & Calculations  MV E max santy: 53.9 cm/sec  MV A max santy: 75.5 cm/sec  MV E/A: 0.71  MV dec slope: 185.5 cm/sec2  MV dec time: 0.29 sec  Ao V2 max: 116.2 cm/sec  Ao max P.4 mmHg  Ao V2 mean: 80.7 cm/sec  Ao mean PG: 3.1 mmHg  Ao V2 VTI: 24.2 cm  SHIMA(I,D): 4.7 cm2  SHIMA(V,D): 4.1 cm2  LV V1 max PG: 3.4 mmHg  LV V1 max: 92.5 cm/sec  LV V1 VTI: 22.3 cm  CO(LVOT): 23.0 l/min  CI(LVOT): 10.8 l/min/m2  SV(LVOT): 114.9 ml  SI(LVOT): 54.1 ml/m2  PA V2 max: 99.2 cm/sec  PA max PG: 3.9 mmHg  PA mean P.2 mmHg  PA V2 VTI: 19.0 cm  AV Santy Ratio (DI): 0.80  SHIMA Index (cm2/m2): 2.2  E/E': 6.0  Peak E' Santy: 8.9 cm/sec        _____________________________________________________________________________  __           Report approved by: Roseanna Diallo 2019 08:00 AM          Recent Labs   Lab Test 19  1427 18  1408 16  1515 13  1401   HGB 14.9 15.0 14.8 14.3    258 289 261   INR  --   --   --  1.0     --  140 139   POTASSIUM 4.1  --  4.6 4.0   CR 0.87  --  0.98 1.10      CBC RESULTS:   Recent Labs   Lab Test 19  1427   WBC 5.7   RBC 4.67   HGB 14.9   HCT 43.2   MCV 93   MCH 31.9   MCHC 34.5   RDW 12.8         Last Comprehensive Metabolic Panel:  Sodium   Date Value Ref Range Status   08/20/2019 139 133 - 144 mmol/L Final     Potassium   Date Value Ref Range Status   08/20/2019 4.1 3.4 - 5.3 mmol/L Final     Chloride   Date Value Ref Range Status   08/20/2019 106 94 - 109 mmol/L Final     Carbon Dioxide   Date Value Ref Range Status   08/20/2019 26 20 - 32 mmol/L Final     Anion Gap   Date Value Ref Range Status   08/20/2019 7 3 - 14 mmol/L Final     Glucose   Date Value Ref Range Status   08/20/2019 90 70 - 99 mg/dL Final     Comment:     Non Fasting     Urea Nitrogen   Date Value Ref Range Status   08/20/2019 10 7 - 30 mg/dL Final     Creatinine   Date Value Ref Range Status   08/20/2019 0.87 0.66 - 1.25 mg/dL Final     GFR Estimate   Date Value Ref Range Status   08/20/2019 >90 >60 mL/min/[1.73_m2] Final     Comment:     Non  GFR Calc  Starting 12/18/2018, serum creatinine based estimated GFR (eGFR) will be   calculated using the Chronic Kidney Disease Epidemiology Collaboration   (CKD-EPI) equation.       Calcium   Date Value Ref Range Status   08/20/2019 8.8 8.5 - 10.1 mg/dL Final     IMPRESSION:   Reason for surgery/procedure: elevated PSA    The proposed surgical procedure is considered LOW risk.    REVISED CARDIAC RISK INDEX  The patient has the following serious cardiovascular risks for perioperative complications such as (MI, PE, VFib and 3  AV Block):  No serious cardiac risks  INTERPRETATION: 0 risks: Class I (very low risk - 0.4% complication rate)    The patient has the following additional risks for perioperative complications:  No identified additional risks  The 10-year ASCVD risk score (Pranav DOV Jr., et al., 2013) is: 7.7%    Values used to calculate the score:      Age: 64 years      Sex: Male      Is Non- : No      Diabetic: No      Tobacco smoker: No      Systolic Blood Pressure: 138 mmHg      Is BP treated: No      HDL Cholesterol: 85 mg/dL       Total Cholesterol: 189 mg/dL    No diagnosis found.    RECOMMENDATIONS:         APPROVAL GIVEN to proceed with proposed procedure, without further diagnostic evaluation       Signed Electronically by: Rosalio Lee DO    Copy of this evaluation report is provided to requesting physician.    [place] Preop Guidelines    Revised Cardiac Risk Index

## 2019-09-18 ENCOUNTER — OFFICE VISIT (OUTPATIENT)
Dept: INTERNAL MEDICINE | Facility: OTHER | Age: 64
End: 2019-09-18
Attending: INTERNAL MEDICINE
Payer: COMMERCIAL

## 2019-09-18 VITALS
HEART RATE: 60 BPM | HEIGHT: 72 IN | WEIGHT: 206 LBS | DIASTOLIC BLOOD PRESSURE: 86 MMHG | BODY MASS INDEX: 27.9 KG/M2 | SYSTOLIC BLOOD PRESSURE: 138 MMHG | TEMPERATURE: 97.3 F | OXYGEN SATURATION: 98 %

## 2019-09-18 DIAGNOSIS — Z01.818 PREOP GENERAL PHYSICAL EXAM: Primary | ICD-10-CM

## 2019-09-18 PROCEDURE — 99214 OFFICE O/P EST MOD 30 MIN: CPT | Performed by: INTERNAL MEDICINE

## 2019-09-18 ASSESSMENT — PAIN SCALES - GENERAL: PAINLEVEL: NO PAIN (0)

## 2019-09-18 ASSESSMENT — ASTHMA QUESTIONNAIRES
QUESTION_1 LAST FOUR WEEKS HOW MUCH OF THE TIME DID YOUR ASTHMA KEEP YOU FROM GETTING AS MUCH DONE AT WORK, SCHOOL OR AT HOME: NONE OF THE TIME
QUESTION_5 LAST FOUR WEEKS HOW WOULD YOU RATE YOUR ASTHMA CONTROL: COMPLETELY CONTROLLED
ACT_TOTALSCORE: 25
QUESTION_4 LAST FOUR WEEKS HOW OFTEN HAVE YOU USED YOUR RESCUE INHALER OR NEBULIZER MEDICATION (SUCH AS ALBUTEROL): NOT AT ALL
QUESTION_2 LAST FOUR WEEKS HOW OFTEN HAVE YOU HAD SHORTNESS OF BREATH: NOT AT ALL
QUESTION_3 LAST FOUR WEEKS HOW OFTEN DID YOUR ASTHMA SYMPTOMS (WHEEZING, COUGHING, SHORTNESS OF BREATH, CHEST TIGHTNESS OR PAIN) WAKE YOU UP AT NIGHT OR EARLIER THAN USUAL IN THE MORNING: NOT AT ALL

## 2019-09-18 ASSESSMENT — MIFFLIN-ST. JEOR: SCORE: 1764.47

## 2019-09-18 NOTE — NURSING NOTE
"Chief Complaint   Patient presents with     Pre-Op Exam       Initial /86 (BP Location: Left arm, Patient Position: Chair, Cuff Size: Adult Regular)   Pulse 60   Temp 97.3  F (36.3  C) (Tympanic)   Ht 1.816 m (5' 11.5\")   Wt 93.4 kg (206 lb)   SpO2 98%   BMI 28.33 kg/m   Estimated body mass index is 28.33 kg/m  as calculated from the following:    Height as of this encounter: 1.816 m (5' 11.5\").    Weight as of this encounter: 93.4 kg (206 lb).  Medication Reconciliation: complete  MIRIAM YUAN LPN  "

## 2019-09-19 ASSESSMENT — ASTHMA QUESTIONNAIRES: ACT_TOTALSCORE: 25

## 2019-10-07 ENCOUNTER — ANESTHESIA EVENT (OUTPATIENT)
Dept: SURGERY | Facility: OTHER | Age: 64
End: 2019-10-07
Payer: COMMERCIAL

## 2019-10-08 ENCOUNTER — ANESTHESIA (OUTPATIENT)
Dept: SURGERY | Facility: OTHER | Age: 64
End: 2019-10-08
Payer: COMMERCIAL

## 2019-10-08 ENCOUNTER — HOSPITAL ENCOUNTER (OUTPATIENT)
Facility: OTHER | Age: 64
Discharge: HOME | End: 2019-10-08
Attending: UROLOGY | Admitting: UROLOGY
Payer: COMMERCIAL

## 2019-10-08 VITALS
OXYGEN SATURATION: 98 % | SYSTOLIC BLOOD PRESSURE: 120 MMHG | RESPIRATION RATE: 16 BRPM | DIASTOLIC BLOOD PRESSURE: 80 MMHG | TEMPERATURE: 97.7 F

## 2019-10-08 PROCEDURE — 27210794 ZZH OR GENERAL SUPPLY STERILE: Performed by: UROLOGY

## 2019-10-08 PROCEDURE — 55700 AS BIOPSY PROSTATE NEEDLE/PUNCH: CPT | Performed by: NURSE ANESTHETIST, CERTIFIED REGISTERED

## 2019-10-08 PROCEDURE — 25000566 ZZH SEVOFLURANE, EA 15 MIN: Performed by: UROLOGY

## 2019-10-08 PROCEDURE — 25000125 ZZHC RX 250: Performed by: NURSE ANESTHETIST, CERTIFIED REGISTERED

## 2019-10-08 PROCEDURE — 25800030 ZZH RX IP 258 OP 636: Performed by: NURSE ANESTHETIST, CERTIFIED REGISTERED

## 2019-10-08 PROCEDURE — 36000050 ZZH SURGERY LEVEL 2 1ST 30 MIN: Performed by: UROLOGY

## 2019-10-08 PROCEDURE — 55700 ZZHC BIOPSY PROSTATE NEEDLE/PUNCH: CPT | Performed by: UROLOGY

## 2019-10-08 PROCEDURE — 25000128 H RX IP 250 OP 636: Performed by: NURSE ANESTHETIST, CERTIFIED REGISTERED

## 2019-10-08 PROCEDURE — 37000008 ZZH ANESTHESIA TECHNICAL FEE, 1ST 30 MIN: Performed by: UROLOGY

## 2019-10-08 PROCEDURE — 40000306 ZZH STATISTIC PRE PROC ASSESS II: Performed by: UROLOGY

## 2019-10-08 PROCEDURE — 76942 ECHO GUIDE FOR BIOPSY: CPT | Mod: 26 | Performed by: UROLOGY

## 2019-10-08 PROCEDURE — 88305 TISSUE EXAM BY PATHOLOGIST: CPT

## 2019-10-08 PROCEDURE — 71000027 ZZH RECOVERY PHASE 2 EACH 15 MINS: Performed by: UROLOGY

## 2019-10-08 PROCEDURE — 76872 US TRANSRECTAL: CPT | Mod: 26 | Performed by: UROLOGY

## 2019-10-08 PROCEDURE — 25000128 H RX IP 250 OP 636: Performed by: UROLOGY

## 2019-10-08 RX ORDER — LIDOCAINE 40 MG/G
CREAM TOPICAL
Status: DISCONTINUED | OUTPATIENT
Start: 2019-10-08 | End: 2019-10-08 | Stop reason: HOSPADM

## 2019-10-08 RX ORDER — SODIUM CHLORIDE 9 MG/ML
INJECTION, SOLUTION INTRAVENOUS CONTINUOUS
Status: DISCONTINUED | OUTPATIENT
Start: 2019-10-08 | End: 2019-10-08 | Stop reason: HOSPADM

## 2019-10-08 RX ORDER — NALOXONE HYDROCHLORIDE 0.4 MG/ML
.1-.4 INJECTION, SOLUTION INTRAMUSCULAR; INTRAVENOUS; SUBCUTANEOUS
Status: DISCONTINUED | OUTPATIENT
Start: 2019-10-08 | End: 2019-10-08 | Stop reason: HOSPADM

## 2019-10-08 RX ORDER — FENTANYL CITRATE 50 UG/ML
25-50 INJECTION, SOLUTION INTRAMUSCULAR; INTRAVENOUS
Status: DISCONTINUED | OUTPATIENT
Start: 2019-10-08 | End: 2019-10-08 | Stop reason: HOSPADM

## 2019-10-08 RX ORDER — GENTAMICIN SULFATE 60 MG/50ML
120 INJECTION, SOLUTION INTRAVENOUS
Status: COMPLETED | OUTPATIENT
Start: 2019-10-08 | End: 2019-10-08

## 2019-10-08 RX ORDER — ONDANSETRON 4 MG/1
4 TABLET, ORALLY DISINTEGRATING ORAL EVERY 30 MIN PRN
Status: DISCONTINUED | OUTPATIENT
Start: 2019-10-08 | End: 2019-10-08 | Stop reason: HOSPADM

## 2019-10-08 RX ORDER — PROPOFOL 10 MG/ML
INJECTION, EMULSION INTRAVENOUS CONTINUOUS PRN
Status: DISCONTINUED | OUTPATIENT
Start: 2019-10-08 | End: 2019-10-08

## 2019-10-08 RX ORDER — MEPERIDINE HYDROCHLORIDE 50 MG/ML
12.5 INJECTION INTRAMUSCULAR; INTRAVENOUS; SUBCUTANEOUS
Status: DISCONTINUED | OUTPATIENT
Start: 2019-10-08 | End: 2019-10-08 | Stop reason: HOSPADM

## 2019-10-08 RX ORDER — LIDOCAINE HYDROCHLORIDE 20 MG/ML
INJECTION, SOLUTION INFILTRATION; PERINEURAL PRN
Status: DISCONTINUED | OUTPATIENT
Start: 2019-10-08 | End: 2019-10-08

## 2019-10-08 RX ORDER — ONDANSETRON 2 MG/ML
4 INJECTION INTRAMUSCULAR; INTRAVENOUS EVERY 30 MIN PRN
Status: DISCONTINUED | OUTPATIENT
Start: 2019-10-08 | End: 2019-10-08 | Stop reason: HOSPADM

## 2019-10-08 RX ADMIN — LIDOCAINE HYDROCHLORIDE 60 MG: 20 INJECTION, SOLUTION INFILTRATION; PERINEURAL at 07:35

## 2019-10-08 RX ADMIN — LIDOCAINE HYDROCHLORIDE 0.1 ML: 10 INJECTION, SOLUTION EPIDURAL; INFILTRATION; INTRACAUDAL; PERINEURAL at 07:28

## 2019-10-08 RX ADMIN — PROPOFOL 140 MCG/KG/MIN: 10 INJECTION, EMULSION INTRAVENOUS at 07:35

## 2019-10-08 RX ADMIN — SODIUM CHLORIDE: 9 INJECTION, SOLUTION INTRAVENOUS at 07:27

## 2019-10-08 RX ADMIN — GENTAMICIN SULFATE 120 MG: 60 INJECTION, SOLUTION INTRAVENOUS at 07:32

## 2019-10-08 NOTE — ANESTHESIA POSTPROCEDURE EVALUATION
Patient: Jose C Corona    Procedure(s):  Transrectal Ultrasound Guided Biopsy of Prostate    Diagnosis:elevated psa  Diagnosis Additional Information: No value filed.    Anesthesia Type:  MAC    Note:  Anesthesia Post Evaluation    Patient location during evaluation: Phase 2  Patient participation: Able to fully participate in evaluation  Level of consciousness: awake and alert  Pain management: adequate  Airway patency: patent  Cardiovascular status: acceptable  Respiratory status: acceptable  Hydration status: euvolemic  PONV: none             Last vitals:  Vitals:    10/08/19 0701 10/08/19 0751   BP: (!) 161/92    Resp: 16    Temp: 97.4  F (36.3  C) 97.7  F (36.5  C)   SpO2: 99% 96%         Electronically Signed By: David Kellerman, APRN CRNA  October 8, 2019  8:35 AM

## 2019-10-08 NOTE — ANESTHESIA CARE TRANSFER NOTE
Patient: Jose C Corona    Procedure(s):  Transrectal Ultrasound Guided Biopsy of Prostate    Diagnosis: elevated psa  Diagnosis Additional Information: No value filed.    Anesthesia Type:   MAC     Note:  Airway :Room Air  Patient transferred to:Phase II  Handoff Report: Identifed the Patient, Identified the Reponsible Provider, Reviewed the pertinent medical history, Discussed the surgical course, Reviewed Intra-OP anesthesia mangement and issues during anesthesia, Set expectations for post-procedure period and Allowed opportunity for questions and acknowledgement of understanding      Vitals: (Last set prior to Anesthesia Care Transfer)    CRNA VITALS  10/8/2019 0719 - 10/8/2019 0749      10/8/2019             Pulse:  71    Ht Rate:  71    SpO2:  95 %    Resp Rate (set):  10                Electronically Signed By: YAKELIN Gallego CRNA  October 8, 2019  7:49 AM

## 2019-10-08 NOTE — ANESTHESIA PREPROCEDURE EVALUATION
Anesthesia Pre-Procedure Evaluation    Patient: Jose C Corona   MRN: [id number] : 1955          Preoperative Diagnosis: elevated psa    Procedure(s):  Transrectal Ultrasound Guided Biopsy of Prostate    Past Medical History:   Diagnosis Date     Mild intermittent asthma without complication 2013    Asthma action plan:  13      Mild persistent asthma 2013     Past Surgical History:   Procedure Laterality Date     CIRCUMCISION       COLONOSCOPY      normal     COLONOSCOPY  2018    2 small polyps recheck 10 years per patient     EXCISE LESION FACE WITH FLAP PEDICLE Left 2018    Procedure: EXCISE LESION FACE WITH FLAP PEDICLE;  EXCISION LEFT NASAL BASAL CELL CARCINOMA WITH FROZENS AND FLAP REPAIR  1 x 0.6  3;  Surgeon: Yaneli Reeves MD;  Location: [facility] OR     ORTHOPEDIC SURGERY  2013    left knee meniscus repair       Anesthesia Evaluation     . Pt has had prior anesthetic.            ROS/MED HX    ENT/Pulmonary:     (+)Intermittent asthma Treatment: Inhaler daily and Inhaler prn,  , . .    Neurologic:  - neg neurologic ROS     Cardiovascular:  - neg cardiovascular ROS       METS/Exercise Tolerance:  >4 METS   Hematologic:  - neg hematologic  ROS       Musculoskeletal:  - neg musculoskeletal ROS       GI/Hepatic:  - neg GI/hepatic ROS       Renal/Genitourinary:  - ROS Renal section negative       Endo:  - neg endo ROS       Psychiatric:  - neg psychiatric ROS       Infectious Disease:  - neg infectious disease ROS       Malignancy:      - no malignancy   Other:    - neg other ROS                      Physical Exam  Normal systems: cardiovascular, pulmonary and dental    Airway   Mallampati: I  TM distance: >3 FB  Neck ROM: full    Dental     Cardiovascular   Rhythm and rate: regular and normal      Pulmonary             Lab Results   Component Value Date    WBC 5.7 2019    HGB 14.9 2019    HCT 43.2 2019     2019     2019    POTASSIUM  "4.1 08/20/2019    CHLORIDE 106 08/20/2019    CO2 26 08/20/2019    BUN 10 08/20/2019    CR 0.87 08/20/2019    GLC 90 08/20/2019    MARINA 8.8 08/20/2019    ALBUMIN 4.0 08/20/2019    PROTTOTAL 7.6 08/20/2019    ALT 41 08/20/2019    AST 24 08/20/2019    ALKPHOS 102 08/20/2019    BILITOTAL 0.7 08/20/2019    INR 1.0 12/05/2013       Preop Vitals  BP Readings from Last 3 Encounters:   10/08/19 (!) 161/92   09/18/19 138/86   09/11/19 130/82    Pulse Readings from Last 3 Encounters:   09/18/19 60   09/11/19 74   08/20/19 51      Resp Readings from Last 3 Encounters:   10/08/19 16   09/11/19 16   01/28/19 14    SpO2 Readings from Last 3 Encounters:   10/08/19 99%   09/18/19 98%   09/11/19 98%      Temp Readings from Last 1 Encounters:   10/08/19 97.4  F (36.3  C) (Tympanic)    Ht Readings from Last 1 Encounters:   09/18/19 1.816 m (5' 11.5\")      Wt Readings from Last 1 Encounters:   09/18/19 93.4 kg (206 lb)    Estimated body mass index is 28.33 kg/m  as calculated from the following:    Height as of 9/18/19: 1.816 m (5' 11.5\").    Weight as of 9/18/19: 93.4 kg (206 lb).       Anesthesia Plan      History & Physical Review      ASA Status:  2 .    NPO Status:  > 8 hours    Plan for MAC with Intravenous induction. Maintenance will be Balanced.      Risks, benefits and alternatives discussed and would like to proceed. General anesthesia ok if indicated.         Postoperative Care      Consents  Anesthetic plan, risks, benefits and alternatives discussed with:  Patient and Spouse.  Use of blood products discussed: No .   .                 YAKELIN Gallego CRNA  "

## 2019-10-08 NOTE — OP NOTE
"Preoperative diagnosis  Elevated PSA    Postoperative diagnosis  Elevated PSA    Procedure  Prostate biopsy  Transrectal ultrasound of the prostate  Transrectal ultrasound guidance of needle biopsy    Surgeon  Juliano Williamson MD    Surgeon(s)/Proceduralist(s) and Assistants (if any)  Surgeon(s):  Juliano Williamson MD  Circulator: Gabby Goldstein RN  First Assistant: Omayra Avila RN  Pre-Op Nurse: Radha Campuzano RN    (EBL) Estimated blood loss (ml)  5    Anesthesia  MAC  2% lidocaine solution, periprostatic injection, 10mL    Complications  None    Specimen  Prostate biopsy x 12 cores    Indications  Mr. Corona is a 64 year old year old male with an elevated PSA.  After discussing his options, the patient decided to proceed with prostate biopsy.  Informed consent was obtained. Possible complications were discussed with the patient during his last visit including, but not limited to, hematuria, hematochezia, prostatitis, urinary tract infection, sepsis, and urinary retention.    Exam  Prostate:   40 grams, symmetric, no nodules or induration    Procedure  The patient was positioned and prepped in a left lateral position with lower extremities flexed.  Lidocaine jelly, 2%, was injected per rectum and gentamicin 120mg was injected intramuscularly. ALYSSA was performed.  The rectal ultrasound probe was slowly introduced into the rectum.  A 22 gauge, 8\" needle was used to perform a periprostatic injection of lidocaine 1%, 10mL through the ultrasound probe.  The prostate and seminal vesicles were inspected systematically using cross and sagittal views with the ultrasound.  There was a hypoechoic area within the prostate left lateral apex.  The dimensions of the prostate were measured to be 42mm X 46mm X 46mm, for a calculated volume of 46g.  Using a true cut 14 Fr biopsy needle, 12 prostate cores were collected. The specific locations on the left were the following: lateral base, lateral mid, lateral apex, medial base, " medial mid, and medial apex.  The right side was sampled in a similar manner.  The ultrasound probe was removed.  The patient tolerated the procedure well.    Plan  Complete course of antibiotics and follow up in about 1 week for path report.

## 2019-10-10 ENCOUNTER — TELEPHONE (OUTPATIENT)
Dept: UROLOGY | Facility: OTHER | Age: 64
End: 2019-10-10

## 2019-10-10 NOTE — TELEPHONE ENCOUNTER
I called the patient and informed him of the good news of the biopsy from 10/8/2019.    The results were 12 out of 12 cores negative for prostate cancer.    I discussed over the phone the the risk of sample error that is present with any random biopsy of a solid organ.    He does have a strong family history of prostate cancer so I recommended he continue obtaining annual PSAs through his PCP.    If the PSA continues to increase I recommended he follow-up with me to consider possible MRI of his prostate.

## 2019-10-31 ENCOUNTER — ALLIED HEALTH/NURSE VISIT (OUTPATIENT)
Dept: FAMILY MEDICINE | Facility: OTHER | Age: 64
End: 2019-10-31
Attending: INTERNAL MEDICINE
Payer: COMMERCIAL

## 2019-10-31 DIAGNOSIS — Z23 NEED FOR PROPHYLACTIC VACCINATION AND INOCULATION AGAINST INFLUENZA: Primary | ICD-10-CM

## 2019-10-31 PROCEDURE — 90471 IMMUNIZATION ADMIN: CPT

## 2019-10-31 PROCEDURE — 90750 HZV VACC RECOMBINANT IM: CPT

## 2019-10-31 PROCEDURE — 90472 IMMUNIZATION ADMIN EACH ADD: CPT

## 2019-10-31 PROCEDURE — 90682 RIV4 VACC RECOMBINANT DNA IM: CPT

## 2020-01-21 ENCOUNTER — OFFICE VISIT (OUTPATIENT)
Dept: DERMATOLOGY | Facility: OTHER | Age: 65
End: 2020-01-21
Attending: DERMATOLOGY
Payer: COMMERCIAL

## 2020-01-21 VITALS
HEART RATE: 54 BPM | RESPIRATION RATE: 18 BRPM | SYSTOLIC BLOOD PRESSURE: 138 MMHG | OXYGEN SATURATION: 97 % | DIASTOLIC BLOOD PRESSURE: 78 MMHG

## 2020-01-21 DIAGNOSIS — Z12.83 SCREENING FOR SKIN CANCER: ICD-10-CM

## 2020-01-21 DIAGNOSIS — D22.9 MULTIPLE BENIGN NEVI: ICD-10-CM

## 2020-01-21 DIAGNOSIS — Z85.828 HISTORY OF NONMELANOMA SKIN CANCER: ICD-10-CM

## 2020-01-21 DIAGNOSIS — L82.1 SEBORRHEIC KERATOSES: Primary | ICD-10-CM

## 2020-01-21 PROCEDURE — 99213 OFFICE O/P EST LOW 20 MIN: CPT | Performed by: DERMATOLOGY

## 2020-01-21 ASSESSMENT — PAIN SCALES - GENERAL: PAINLEVEL: NO PAIN (0)

## 2020-01-21 NOTE — LETTER
1/21/2020       RE: Jose C Corona  [address] [facility] [address]     Dear Colleague,    Thank you for referring your patient, Jose C Corona, to the [facility]. Please see a copy of my visit note below.    dictated    Again, thank you for allowing me to participate in the care of your patient.      Sincerely,    DAVIDSON Roberson MD

## 2020-01-21 NOTE — NURSING NOTE
"Chief Complaint   Patient presents with     Skin Check       Initial /78 (BP Location: Left arm, Patient Position: Sitting, Cuff Size: Adult Regular)   Pulse 54   Resp 18   SpO2 97%  Estimated body mass index is 28.33 kg/m  as calculated from the following:    Height as of 9/18/19: 1.816 m (5' 11.5\").    Weight as of 9/18/19: 93.4 kg (206 lb).  Medication Reconciliation: dallas Melgar  "

## 2020-01-22 NOTE — CONSULTS
Consult Date:  2020      SUBJECTIVE:  Recheck visit for Jose C who has had a basal cell on the nose in the past.  He comes in for a skin check.  His wife is an RN.      OBJECTIVE:  Shows a healthy gentleman in no distress.  We checked his face, neck, chest and back.  Found some scattered seborrheic keratoses and nevi, but no evidence of any precancers or cancers.  Overall, excellent quality skin at this point.  No evidence of recurrence of the basal cell on his nose.      ASSESSMENT:  No worrisome lesions.      PLAN:  Reassured.  Return again in 1 year.  Have his wife check his back from time to time for evidence of an expanding dark nevus that could be a skin cancer.      MEDICATIONS AND ALLERGIES:  Reviewed.         DAVIDSON COVINGTON MD             D: 2020   T: 2020   MT: KYLEE      Name:     JOSE C LOPEZ   MRN:      [id number]        Account:       [id number]   :      1955           Consult Date:  2020      Document: [id number]

## 2020-03-02 ENCOUNTER — HEALTH MAINTENANCE LETTER (OUTPATIENT)
Age: 65
End: 2020-03-02

## 2020-03-23 ENCOUNTER — TELEPHONE (OUTPATIENT)
Dept: INTERNAL MEDICINE | Facility: OTHER | Age: 65
End: 2020-03-23

## 2020-03-23 NOTE — TELEPHONE ENCOUNTER
Pt canceled appointment that was scheduled for today and wants to re-schedule. Please call pt to discuss a telephone visit and/or advise pt when this appt can be rescheduled.

## 2020-05-31 NOTE — LETTER
2018         RE: Jose C Corona  [address] [place] [address]        Dear Colleague,    Thank you for referring your patient, Jose C Corona, to the [facility] [place]. Please see a copy of my visit note below.    Otolaryngology Consultation    Patient: Jose C Corona  : 1955    Patient presents with:  Consult: Facial/nose lesion non healing, been about 1 year per Dr Kendall    This patient presents today for a nasal and forehead facial skin lesion.  Present over 6 months, changing and growing.  Nasal lesion is not healing.  No known history of carcinoma skin or melanoma.  Excess sun exposure with sun burns throughout life. No immunodeficiency.      Never tobacco user    He has had a recent excision of a scalp epidermal inclusion cyst on May 10 with Dr. Kendall      Current Outpatient Rx   Medication Sig Dispense Refill     Beclomethasone Dipropionate (BECLOVENT IN) Inhale 1-2 puffs into the lungs as needed        fluticasone (FLOVENT HFA) 110 MCG/ACT Inhaler Inhale 1 puff into the lungs 2 times daily 12 g 5     [DISCONTINUED] albuterol (PROAIR HFA, PROVENTIL HFA, VENTOLIN HFA) 108 (90 BASE) MCG/ACT inhaler Inhale 2 puffs into the lungs every 6 hours         Allergies: Seasonal allergies     Past Medical History:   Diagnosis Date     Mild persistent asthma 2013       Past Surgical History:   Procedure Laterality Date     CIRCUMCISION       COLONOSCOPY      normal     COLONOSCOPY  2018    2 small polyps recheck 10 years per patient     ORTHOPEDIC SURGERY  2013    left knee miniscus repair       ENT family history reviewed    Social History   Substance Use Topics     Smoking status: Never Smoker     Smokeless tobacco: Never Used     Alcohol use Yes      Comment: weekends, beer       Review of Systems  ROS: 10 point ROS neg other than the symptoms noted above in the HPI and shortness of breath on exertion, wheezing, sneezing, itchy eyes    Physical Exam  /68  Pulse  "58  Temp 96.8  F (36  C) (Tympanic)  Resp 16  Ht 5' 11\" (1.803 m)  Wt 206 lb (93.4 kg)  SpO2 98%  BMI 28.73 kg/m2  General - The patient is well nourished and well developed, and appears to have good nutritional status.  Alert and oriented to person and place, answers questions and cooperates with examination appropriately.   Skin-right upper forehead raised hypervascular nevi 1.0 cm, just to left of central nasal dorsum there is a superficially ulcerated irregular macule 1.0 cm  Posterior scalp closure well healed  Head and Face - Normocephalic and atraumatic, with no gross asymmetry noted.  The facial nerve is intact, with strong symmetric movements.  Voice and Breathing - The patient was breathing comfortably without the use of accessory muscles. There was no wheezing, stridor, or stertor.  The patients voice was clear and strong, and had appropriate pitch and quality.  No nusrat peripheral digital clubbing or cyanosis   Eyes - Extraocular movements intact, and the pupils were reactive to light.  Sclera were not icteric or injected, conjunctiva were pink and moist.  Neck - Normal midline excursion of the laryngotracheal complex during swallowing.  Full range of motion on passive movement.  Palpation of the occipital, submental, submandibular, internal jugular chain, and supraclavicular nodes did not demonstrate any abnormal lymph nodes or masses.  Palpation of the thyroid was soft and smooth, with no nodules or goiter appreciated.  The trachea was mobile and midline.  Nose - External contour is symmetric, no gross deflection or scars.  Nasal mucosa is pink and moist with no abnormal mucus.   No polyps, masses, or purulence noted on examination.    Office Procedure:   I discussed the risks and complications of skin lesion excision, including local anesthesia, bleeding, infection, injury to major/minor arteries, nerves and veins, scar formation, hypertrophic healing or keloid, numbness to area, recurrence of " lesion, benign versus malignant pathology and possible need for further surgery. All questions were answered.    After informed consent was discussed and a time- out taken, I proceeded to cleanse the skin around the lesion with alcohol.  I then demarcated the lesion parallel to relaxed skin tension lines in a natural crease at the right forehead and left nasal dorsum.  The area was prepped and draped in the normal sterile fashion.  I then infiltrated the skin with 1% lidocaine with 1:200,000 epinephrine.  I  used a 15-blade to create an elliptical incision around the lesion, with margins of 1-2 mm of grossly normal skin.  I then elevated the skin ellipse and a thin cuff of underlying subdermal fat with curved iris scissors.  I dissected down through normal subcutaneous tissue for complete removal of the lesion.  After the lesions were completely removed, I then placed it in formalin for permanent section.  Hemostasis was achieved with direct pressure and hand held cautery. I then irrigated the wound and gently suctioned the area.  I advanced the adjacent skin for tension free closure using tenotomy scissors.  The total length of the incisions were  1.3 cm nose and 1.4 cm forehead.    I then proceeded to close the incision by using simple interrupted buried knot sutures, using 5-0 Vicryl.  The skin edges were then reapproximated using 5-0 nylon, simple interrupted sutures.  Antibiotic ointment was then applied and the wound was dressed.  The patient tolerated the procedure without any difficulty.    A/P  This patient had a skin lesion excised today.  I have instructed the patient on wound care and signs of infection.  Written instructions provided.  We will contact the patient with pathology results.    If any additional surgery needs to be scheduled we will discuss this after pathology results are finalized.     No soaking of the wound for 3 weeks, may shower.     Follow up with LPN in 1 week for suture removal, or  as otherwise indicated.      If you prefer, you may remove your own nylon sutures in 1 week per our instructions, followed by careful application of benzoin and steri strips.    If the final pathology reveals a carcinoma, he will need additional excision with 3 mm clear margins  and outpatient surgery with frozen section possible flap repair this was discussed today.  Photos taken    Sunscreen use and skin cancer preventive measures discussed         Impression and Plan- Jose C Corona is a 63 year old male with:    ICD-10-CM    1. Non-healing skin lesion of nose L98.9    2. Skin lesion of forehead L98.9              Yaneli Reeves D.O.  Otolaryngology/Head and Neck Surgery  Allergy      Again, thank you for allowing me to participate in the care of your patient.        Sincerely,        Yaneli Reeves MD     [place]

## 2020-08-06 ENCOUNTER — OFFICE VISIT (OUTPATIENT)
Dept: INTERNAL MEDICINE | Facility: OTHER | Age: 65
End: 2020-08-06
Attending: INTERNAL MEDICINE
Payer: COMMERCIAL

## 2020-08-06 VITALS
HEART RATE: 57 BPM | SYSTOLIC BLOOD PRESSURE: 120 MMHG | WEIGHT: 205 LBS | OXYGEN SATURATION: 98 % | HEIGHT: 71 IN | TEMPERATURE: 96.9 F | DIASTOLIC BLOOD PRESSURE: 81 MMHG | BODY MASS INDEX: 28.7 KG/M2

## 2020-08-06 DIAGNOSIS — Z00.00 ROUTINE GENERAL MEDICAL EXAMINATION AT A HEALTH CARE FACILITY: Primary | ICD-10-CM

## 2020-08-06 DIAGNOSIS — Z23 NEED FOR VACCINATION: ICD-10-CM

## 2020-08-06 DIAGNOSIS — Z12.5 SCREENING FOR PROSTATE CANCER: ICD-10-CM

## 2020-08-06 DIAGNOSIS — J45.30 MILD PERSISTENT ASTHMA WITHOUT COMPLICATION: ICD-10-CM

## 2020-08-06 DIAGNOSIS — B07.8 COMMON WART: ICD-10-CM

## 2020-08-06 PROCEDURE — 90471 IMMUNIZATION ADMIN: CPT | Performed by: INTERNAL MEDICINE

## 2020-08-06 PROCEDURE — 99396 PREV VISIT EST AGE 40-64: CPT | Mod: 25 | Performed by: INTERNAL MEDICINE

## 2020-08-06 PROCEDURE — 90714 TD VACC NO PRESV 7 YRS+ IM: CPT | Performed by: INTERNAL MEDICINE

## 2020-08-06 RX ORDER — DEXAMETHASONE 4 MG/1
1 TABLET ORAL DAILY
Qty: 12 G | Refills: 11 | Status: SHIPPED | OUTPATIENT
Start: 2020-08-06 | End: 2021-08-26

## 2020-08-06 ASSESSMENT — MIFFLIN-ST. JEOR: SCORE: 1747

## 2020-08-06 ASSESSMENT — ANXIETY QUESTIONNAIRES
5. BEING SO RESTLESS THAT IT IS HARD TO SIT STILL: NOT AT ALL
4. TROUBLE RELAXING: NOT AT ALL
2. NOT BEING ABLE TO STOP OR CONTROL WORRYING: NOT AT ALL
7. FEELING AFRAID AS IF SOMETHING AWFUL MIGHT HAPPEN: NOT AT ALL
6. BECOMING EASILY ANNOYED OR IRRITABLE: NOT AT ALL
3. WORRYING TOO MUCH ABOUT DIFFERENT THINGS: NOT AT ALL

## 2020-08-06 ASSESSMENT — ASTHMA QUESTIONNAIRES
QUESTION_2 LAST FOUR WEEKS HOW OFTEN HAVE YOU HAD SHORTNESS OF BREATH: NOT AT ALL
QUESTION_5 LAST FOUR WEEKS HOW WOULD YOU RATE YOUR ASTHMA CONTROL: COMPLETELY CONTROLLED
ACUTE_EXACERBATION_TODAY: NO
ACT_TOTALSCORE: 25
QUESTION_4 LAST FOUR WEEKS HOW OFTEN HAVE YOU USED YOUR RESCUE INHALER OR NEBULIZER MEDICATION (SUCH AS ALBUTEROL): NOT AT ALL
QUESTION_1 LAST FOUR WEEKS HOW MUCH OF THE TIME DID YOUR ASTHMA KEEP YOU FROM GETTING AS MUCH DONE AT WORK, SCHOOL OR AT HOME: NONE OF THE TIME
QUESTION_3 LAST FOUR WEEKS HOW OFTEN DID YOUR ASTHMA SYMPTOMS (WHEEZING, COUGHING, SHORTNESS OF BREATH, CHEST TIGHTNESS OR PAIN) WAKE YOU UP AT NIGHT OR EARLIER THAN USUAL IN THE MORNING: NOT AT ALL

## 2020-08-06 ASSESSMENT — PATIENT HEALTH QUESTIONNAIRE - PHQ9: SUM OF ALL RESPONSES TO PHQ QUESTIONS 1-9: 0

## 2020-08-06 ASSESSMENT — PAIN SCALES - GENERAL: PAINLEVEL: NO PAIN (0)

## 2020-08-06 NOTE — PROGRESS NOTES
3  SUBJECTIVE:   CC: Jose C Corona is an 65 year old male who presents for preventive health visit.     Healthy Habits:    Do you get at least three servings of calcium containing foods daily (dairy, green leafy vegetables, etc.)? yes and No    Amount of exercise or daily activities, outside of work: 7 day(s) per week    Problems taking medications regularly No    Medication side effects: No    Have you had an eye exam in the past two years? yes    Do you see a dentist twice per year? yes    Do you have sleep apnea, excessive snoring or daytime drowsiness?no      Asthma Follow-Up    Was ACT completed today?    Yes    ACT Total Scores 8/6/2020   ACT TOTAL SCORE -   ASTHMA ER VISITS -   ASTHMA HOSPITALIZATIONS -   ACT TOTAL SCORE (Goal Greater than or Equal to 20) 25   In the past 12 months, how many times did you visit the emergency room for your asthma without being admitted to the hospital? 0   In the past 12 months, how many times were you hospitalized overnight because of your asthma? 0         How many days per week do you miss taking your asthma controller medication?  0    Please describe any recent triggers for your asthma: dust mites, pollens, animal dander and humidity    Have you had any Emergency Room Visits, Urgent Care Visits, or Hospital Admissions since your last office visit?  No      Jose C presents today for routine follow up.  He has no specific complaints except for a wart on his left forearm.  He otherwise denies chest pain or SOB.  He needs a refill of his inhaler.  He asthma is well controlled. He is due for annual labs and PSA.  He has a history of slightly elevated PSA and bx done last year which was negative.       Today's PHQ-2 Score:   PHQ-2 ( 1999 Pfizer) 8/6/2020 9/18/2019   Q1: Little interest or pleasure in doing things 0 0   Q2: Feeling down, depressed or hopeless 0 0   PHQ-2 Score 0 0       Abuse: Current or Past(Physical, Sexual or Emotional)- No  Do you feel safe in your  environment? Yes        Social History     Tobacco Use     Smoking status: Never Smoker     Smokeless tobacco: Never Used   Substance Use Topics     Alcohol use: Yes     Comment: weekends, beer     If you drink alcohol do you typically have >3 drinks per day or >7 drinks per week? No                      Last PSA:   PSA   Date Value Ref Range Status   08/20/2019 4.82 (H) 0 - 4 ug/L Final     Comment:     Assay Method:  Chemiluminescence using Siemens Vista analyzer       Reviewed orders with patient. Reviewed health maintenance and updated orders accordingly - Yes  Lab work is in process  Labs reviewed in EPIC  BP Readings from Last 3 Encounters:   08/06/20 120/81   01/21/20 138/78   10/08/19 120/80    Wt Readings from Last 3 Encounters:   08/06/20 93 kg (205 lb)   09/18/19 93.4 kg (206 lb)   08/20/19 93 kg (205 lb)                  Patient Active Problem List   Diagnosis     Mild intermittent asthma without complication     ACP (advance care planning)     Past Surgical History:   Procedure Laterality Date     BIOPSY PROSTATE TRANSRECTAL N/A 10/8/2019    Procedure: Transrectal Ultrasound Guided Biopsy of Prostate;  Surgeon: Juliano Williamson MD;  Location: [facility] OR     CIRCUMCISION       COLONOSCOPY  2007    normal     COLONOSCOPY  06/2018    2 small polyps recheck 10 years per patient     EXCISE LESION FACE WITH FLAP PEDICLE Left 8/22/2018    Procedure: EXCISE LESION FACE WITH FLAP PEDICLE;  EXCISION LEFT NASAL BASAL CELL CARCINOMA WITH FROZENS AND FLAP REPAIR  1 x 0.6  3;  Surgeon: Yaneli Reeves MD;  Location: [facility] OR     ORTHOPEDIC SURGERY  12/2013    left knee meniscus repair       Social History     Tobacco Use     Smoking status: Never Smoker     Smokeless tobacco: Never Used   Substance Use Topics     Alcohol use: Yes     Comment: weekends, beer     Family History   Problem Relation Age of Onset     Cancer Mother 54        lung     Heart Disease Father 75        MI     Other Cancer Brother 65        PASSED  FROM PROSTATE CANCER     Genitourinary Problems Brother         enlarged prostate     Prostate Cancer Brother          Current Outpatient Medications   Medication Sig Dispense Refill     fluticasone (FLOVENT HFA) 110 MCG/ACT inhaler Inhale 1 puff into the lungs daily INHALE 1 PUFF INTO THE LUNGS TWICE DAILY 12 g 11     albuterol (PROAIR HFA/PROVENTIL HFA/VENTOLIN HFA) 108 (90 Base) MCG/ACT inhaler Inhale 2 puffs into the lungs every 6 hours 1 Inhaler 5     Allergies   Allergen Reactions     Shellfish-Derived Products Rash     Also gets flushed face     Seasonal Allergies        Reviewed and updated as needed this visit by clinical staff  Tobacco  Allergies  Meds  Med Hx  Surg Hx  Fam Hx  Soc Hx        Reviewed and updated as needed this visit by Provider        Past Medical History:   Diagnosis Date     Mild intermittent asthma without complication 6/26/2013    Asthma action plan:  6/26/13      Mild persistent asthma 6/26/2013      Past Surgical History:   Procedure Laterality Date     BIOPSY PROSTATE TRANSRECTAL N/A 10/8/2019    Procedure: Transrectal Ultrasound Guided Biopsy of Prostate;  Surgeon: Juliano Williamson MD;  Location:  OR     CIRCUMCISION       COLONOSCOPY  2007    normal     COLONOSCOPY  06/2018    2 small polyps recheck 10 years per patient     EXCISE LESION FACE WITH FLAP PEDICLE Left 8/22/2018    Procedure: EXCISE LESION FACE WITH FLAP PEDICLE;  EXCISION LEFT NASAL BASAL CELL CARCINOMA WITH FROZENS AND FLAP REPAIR  1 x 0.6  3;  Surgeon: Yaneli Reeves MD;  Location: [facility] OR     ORTHOPEDIC SURGERY  12/2013    left knee meniscus repair       ROS:  CONSTITUTIONAL: NEGATIVE for fever, chills, change in weight  INTEGUMENTARY/SKIN: Wart on lft forearm  EYES: NEGATIVE for vision changes or irritation  ENT: NEGATIVE for ear, mouth and throat problems  RESP: NEGATIVE for significant cough or SOB  CV: NEGATIVE for chest pain, palpitations or peripheral edema  GI: NEGATIVE for nausea, abdominal  "pain, heartburn, or change in bowel habits   male: negative for dysuria, hematuria, decreased urinary stream, erectile dysfunction, urethral discharge  MUSCULOSKELETAL: NEGATIVE for significant arthralgias or myalgia  NEURO: NEGATIVE for weakness, dizziness or paresthesias  PSYCHIATRIC: NEGATIVE for changes in mood or affect    OBJECTIVE:   /81 (BP Location: Left arm, Patient Position: Chair, Cuff Size: Adult Regular)   Pulse 57   Temp 96.9  F (36.1  C) (Tympanic)   Ht 1.803 m (5' 11\")   Wt 93 kg (205 lb)   SpO2 98%   BMI 28.59 kg/m    EXAM:  GENERAL: Alert and no distress  EYES: Eyes grossly normal to inspection, PERRL and conjunctivae and sclerae normal  HENT: ear canals and TM's normal  NECK: no adenopathy, no asymmetry, masses  RESP: lungs clear to auscultation - no rales, rhonchi or wheezes  CV: regular rate and rhythm, normal S1 S2, no S3 or S4, no murmurs  ABDOMEN: soft, nontender, no hepatosplenomegaly, no masses and bowel sounds normal  MS: no gross musculoskeletal defects noted, no edema  SKIN:  wart - Left forearm  NEURO: Normal strength and tone, mentation intact and speech normal  PSYCH: mentation appears normal, affect normal/bright    Diagnostic Test Results:  Labs reviewed in Epic    ASSESSMENT/PLAN:   Jose C was seen today for physical.    Diagnoses and all orders for this visit:    Routine general medical examination at a health care facility  -     Lipid Profile (Chol, Trig, HDL, LDL calc); Future  -     Comprehensive metabolic panel (BMP + Alb, Alk Phos, ALT, AST, Total. Bili, TP); Future  -     CBC with platelets and differential; Future    Mild persistent asthma without complication  -     fluticasone (FLOVENT HFA) 110 MCG/ACT inhaler; Inhale 1 puff into the lungs daily INHALE 1 PUFF INTO THE LUNGS TWICE DAILY    Screening for prostate cancer  -     PSA, screen; Future    Common wart:  Liquid nitrogen was applied to this left forearm wart x 4        COUNSELING:  Reviewed preventive " "health counseling, as reflected in patient instructions       Regular exercise       Healthy diet/nutrition       Prostate cancer screening    Estimated body mass index is 28.59 kg/m  as calculated from the following:    Height as of this encounter: 1.803 m (5' 11\").    Weight as of this encounter: 93 kg (205 lb).         reports that he has never smoked. He has never used smokeless tobacco.      Counseling Resources:  ATP IV Guidelines  Pooled Cohorts Equation Calculator  FRAX Risk Assessment  ICSI Preventive Guidelines  Dietary Guidelines for Americans, 2010  USDA's MyPlate  ASA Prophylaxis  Lung CA Screening    Rosalio Lee, DO  [facility]  "

## 2020-08-06 NOTE — LETTER
My Asthma Action Plan    Name: Jose C Corona   YOB: 1955  Date: 8/6/2020   My doctor: Rosalio Lee, DO   My clinic: [facility]        My Control Medicine: Fluticasone propionate (Flovent HFA) - 110 mcg  BID  My Rescue Medicine: Albuterol (Proair/Ventolin/Proventil HFA) 2-4 puffs EVERY 4 HOURS as needed. Use a spacer if recommended by your provider.   My Asthma Severity:   Mild Persistent  Know your asthma triggers: dust mites, mold and humidity  pollens  animal dander            GREEN ZONE   Good Control    I feel good    No cough or wheeze    Can work, sleep and play without asthma symptoms       Take your asthma control medicine every day.     1. If exercise triggers your asthma, take your rescue medication    15 minutes before exercise or sports, and    During exercise if you have asthma symptoms  2. Spacer to use with inhaler: If you have a spacer, make sure to use it with your inhaler             YELLOW ZONE Getting Worse  I have ANY of these:    I do not feel good    Cough or wheeze    Chest feels tight    Wake up at night   1. Keep taking your Green Zone medications  2. Start taking your rescue medicine:    every 20 minutes for up to 1 hour. Then every 4 hours for 24-48 hours.  3. If you stay in the Yellow Zone for more than 12-24 hours, contact your doctor.  4. If you do not return to the Green Zone in 12-24 hours or you get worse, start taking your oral steroid medicine if prescribed by your provider.           RED ZONE Medical Alert - Get Help  I have ANY of these:    I feel awful    Medicine is not helping    Breathing getting harder    Trouble walking or talking    Nose opens wide to breathe       1. Take your rescue medicine NOW  2. If your provider has prescribed an oral steroid medicine, start taking it NOW  3. Call your doctor NOW  4. If you are still in the Red Zone after 20 minutes and you have not reached your doctor:    Take your rescue medicine  again and    Call 911 or go to the emergency room right away    See your regular doctor within 2 weeks of an Emergency Room or Urgent Care visit for follow-up treatment.          Annual Reminders:  Meet with Asthma Educator,  Flu Shot in the Fall, consider Pneumonia Vaccination for patients with asthma (aged 19 and older).    Pharmacy:    [id number] - [place] - [address] AT [facility] [address]  [facility] - [place], [place] - [address]    Electronically signed by Rosalio Lee, DO   Date: 08/06/20                      Asthma Triggers  How To Control Things That Make Your Asthma Worse    Triggers are things that make your asthma worse.  Look at the list below to help you find your triggers and what you can do about them.  You can help prevent asthma flare-ups by staying away from your triggers.      Trigger                                                          What you can do   Cigarette Smoke  Tobacco smoke can make asthma worse. Do not allow smoking in your home, car or around you.  Be sure no one smokes at a child s day care or school.  If you smoke, ask your health care provider for ways to help you quit.  Ask family members to quit too.  Ask your health care provider for a referral to Quit Plan to help you quit smoking, or call 2-025-839-PLAN.     Colds, Flu, Bronchitis  These are common triggers of asthma. Wash your hands often.  Don t touch your eyes, nose or mouth.  Get a flu shot every year.     Dust Mites  These are tiny bugs that live in cloth or carpet. They are too small to see. Wash sheets and blankets in hot water every week.   Encase pillows and mattress in dust mite proof covers.  Avoid having carpet if you can. If you have carpet, vacuum weekly.   Use a dust mask and HEPA vacuum.   Pollen and Outdoor Mold  Some people are allergic to trees, grass, or weed pollen, or molds. Try to keep your windows closed.  Limit time out doors when  pollen count is high.   Ask you health care provider about taking medicine during allergy season.     Animal Dander  Some people are allergic to skin flakes, urine or saliva from pets with fur or feathers. Keep pets with fur or feathers out of your home.    If you can t keep the pet outdoors, then keep the pet out of your bedroom.  Keep the bedroom door closed.  Keep pets off cloth furniture and away from stuffed toys.     Mice, Rats, and Cockroaches   Some people are allergic to the waste from these pests.   Cover food and garbage.  Clean up spills and food crumbs.  Store grease in the refrigerator.   Keep food out of the bedroom.   Indoor Mold  This can be a trigger if your home has high moisture. Fix leaking faucets, pipes, or other sources of water.   Clean moldy surfaces.  Dehumidify basement if it is damp and smelly.   Smoke, Strong Odors, and Sprays  These can reduce air quality. Stay away from strong odors and sprays, such as perfume, powder, hair spray, paints, smoke incense, paint, cleaning products, candles and new carpet.   Exercise or Sports  Some people with asthma have this trigger. Be active!  Ask your doctor about taking medicine before sports or exercise to prevent symptoms.    Warm up for 5-10 minutes before and after sports or exercise.     Other Triggers of Asthma  Cold air:  Cover your nose and mouth with a scarf.  Sometimes laughing or crying can be a trigger.  Some medicines and food can trigger asthma.

## 2020-08-06 NOTE — NURSING NOTE
"Chief Complaint   Patient presents with     Physical       Initial /81 (BP Location: Left arm, Patient Position: Chair, Cuff Size: Adult Regular)   Pulse 57   Temp 96.9  F (36.1  C) (Tympanic)   Ht 1.803 m (5' 11\")   Wt 93 kg (205 lb)   SpO2 98%   BMI 28.59 kg/m   Estimated body mass index is 28.59 kg/m  as calculated from the following:    Height as of this encounter: 1.803 m (5' 11\").    Weight as of this encounter: 93 kg (205 lb).  Medication Reconciliation: complete  MIRIAM YUAN LPN  "

## 2020-08-07 ASSESSMENT — ASTHMA QUESTIONNAIRES: ACT_TOTALSCORE: 25

## 2020-08-21 DIAGNOSIS — Z12.5 SCREENING FOR PROSTATE CANCER: ICD-10-CM

## 2020-08-21 DIAGNOSIS — Z00.00 ROUTINE GENERAL MEDICAL EXAMINATION AT A HEALTH CARE FACILITY: ICD-10-CM

## 2020-08-21 LAB
ALBUMIN SERPL-MCNC: 3.9 G/DL (ref 3.4–5)
ALP SERPL-CCNC: 91 U/L (ref 40–150)
ALT SERPL W P-5'-P-CCNC: 33 U/L (ref 0–70)
ANION GAP SERPL CALCULATED.3IONS-SCNC: 6 MMOL/L (ref 3–14)
AST SERPL W P-5'-P-CCNC: 22 U/L (ref 0–45)
BASOPHILS # BLD AUTO: 0.1 10E9/L (ref 0–0.2)
BASOPHILS NFR BLD AUTO: 0.9 %
BILIRUB SERPL-MCNC: 0.5 MG/DL (ref 0.2–1.3)
BUN SERPL-MCNC: 10 MG/DL (ref 7–30)
CALCIUM SERPL-MCNC: 9.2 MG/DL (ref 8.5–10.1)
CHLORIDE SERPL-SCNC: 107 MMOL/L (ref 94–109)
CHOLEST SERPL-MCNC: 190 MG/DL
CO2 SERPL-SCNC: 26 MMOL/L (ref 20–32)
CREAT SERPL-MCNC: 0.89 MG/DL (ref 0.66–1.25)
DIFFERENTIAL METHOD BLD: NORMAL
EOSINOPHIL # BLD AUTO: 0.4 10E9/L (ref 0–0.7)
EOSINOPHIL NFR BLD AUTO: 5.3 %
ERYTHROCYTE [DISTWIDTH] IN BLOOD BY AUTOMATED COUNT: 12.8 % (ref 10–15)
GFR SERPL CREATININE-BSD FRML MDRD: 89 ML/MIN/{1.73_M2}
GLUCOSE SERPL-MCNC: 94 MG/DL (ref 70–99)
HCT VFR BLD AUTO: 44.2 % (ref 40–53)
HDLC SERPL-MCNC: 83 MG/DL
HGB BLD-MCNC: 15.2 G/DL (ref 13.3–17.7)
LDLC SERPL CALC-MCNC: 96 MG/DL
LYMPHOCYTES # BLD AUTO: 1.9 10E9/L (ref 0.8–5.3)
LYMPHOCYTES NFR BLD AUTO: 29.4 %
MCH RBC QN AUTO: 31.3 PG (ref 26.5–33)
MCHC RBC AUTO-ENTMCNC: 34.4 G/DL (ref 31.5–36.5)
MCV RBC AUTO: 91 FL (ref 78–100)
MONOCYTES # BLD AUTO: 0.6 10E9/L (ref 0–1.3)
MONOCYTES NFR BLD AUTO: 8.5 %
NEUTROPHILS # BLD AUTO: 3.7 10E9/L (ref 1.6–8.3)
NEUTROPHILS NFR BLD AUTO: 55.9 %
NONHDLC SERPL-MCNC: 107 MG/DL
PLATELET # BLD AUTO: 290 10E9/L (ref 150–450)
POTASSIUM SERPL-SCNC: 4.5 MMOL/L (ref 3.4–5.3)
PROT SERPL-MCNC: 7.7 G/DL (ref 6.8–8.8)
PSA SERPL-ACNC: 5.23 UG/L (ref 0–4)
RBC # BLD AUTO: 4.85 10E12/L (ref 4.4–5.9)
SODIUM SERPL-SCNC: 139 MMOL/L (ref 133–144)
TRIGL SERPL-MCNC: 56 MG/DL
WBC # BLD AUTO: 6.6 10E9/L (ref 4–11)

## 2020-08-21 PROCEDURE — 80061 LIPID PANEL: CPT | Performed by: INTERNAL MEDICINE

## 2020-08-21 PROCEDURE — 80053 COMPREHEN METABOLIC PANEL: CPT | Performed by: INTERNAL MEDICINE

## 2020-08-21 PROCEDURE — G0103 PSA SCREENING: HCPCS | Performed by: INTERNAL MEDICINE

## 2020-08-21 PROCEDURE — 85025 COMPLETE CBC W/AUTO DIFF WBC: CPT | Performed by: INTERNAL MEDICINE

## 2020-08-21 PROCEDURE — 36415 COLL VENOUS BLD VENIPUNCTURE: CPT | Performed by: INTERNAL MEDICINE

## 2020-08-25 DIAGNOSIS — R97.20 ELEVATED PROSTATE SPECIFIC ANTIGEN (PSA): Primary | ICD-10-CM

## 2020-08-27 ENCOUNTER — OFFICE VISIT (OUTPATIENT)
Dept: UROLOGY | Facility: OTHER | Age: 65
End: 2020-08-27
Attending: UROLOGY
Payer: COMMERCIAL

## 2020-08-27 VITALS
SYSTOLIC BLOOD PRESSURE: 102 MMHG | BODY MASS INDEX: 28.56 KG/M2 | WEIGHT: 204.8 LBS | DIASTOLIC BLOOD PRESSURE: 64 MMHG | HEART RATE: 56 BPM | RESPIRATION RATE: 16 BRPM | TEMPERATURE: 97.3 F

## 2020-08-27 DIAGNOSIS — R97.20 ELEVATED PSA: Primary | ICD-10-CM

## 2020-08-27 PROCEDURE — 99214 OFFICE O/P EST MOD 30 MIN: CPT | Performed by: UROLOGY

## 2020-08-27 ASSESSMENT — PAIN SCALES - GENERAL: PAINLEVEL: NO PAIN (0)

## 2020-08-27 NOTE — NURSING NOTE
"Chief Complaint   Patient presents with     RECHECK     F/U elevated PSA       Initial /64 (BP Location: Right arm, Patient Position: Sitting, Cuff Size: Adult Regular)   Pulse 56   Temp 97.3  F (36.3  C) (Temporal)   Resp 16   Wt 92.9 kg (204 lb 12.8 oz)   BMI 28.56 kg/m   Estimated body mass index is 28.56 kg/m  as calculated from the following:    Height as of 8/6/20: 1.803 m (5' 11\").    Weight as of this encounter: 92.9 kg (204 lb 12.8 oz).  Medication Reconciliation: Completed     Review of Systems:    Weight loss:    No     Recent fever/chills:  No   Night sweats:   No  Current skin rash:  No   Recent hair loss:  No  Heat intolerance:  No   Cold intolerance:  No  Chest pain:   No   Palpitations:   No  Shortness of breath:  No   Wheezing:   No  Constipation:    No   Diarrhea:   No   Nausea:   No   Vomiting:   No   Kidney/side pain:  No   Back pain:   No  Frequent headaches:  No   Dizziness:     No  Leg swelling:   No   Calf pain:    No          Lety Wilson LPN  "

## 2020-08-27 NOTE — PROGRESS NOTES
"PCP:  Dr Lee    Type of Visit  EST    Chief Complaint  Elevated PSA    HPI  Mr. Corona is a 64 year old male who follows up with an elevated PSA.  He does have a family history of prostate cancer: brother diagnosed at 62 and  from prostate cancer at 65, and another brother diagnosed at age 70.  The patient underwent a biopsy last October which was negative for prostate cancer.  The patient underwent a repeat PSA last week revealing a continued upward trend.  He denies acute urinary symptoms or dysuria at the time of the PSA collection.  He denies any health changes since the last visit.  He denies unintentional weight loss, bone or pelvic pain.      Family History  Family History   Problem Relation Age of Onset     Cancer Mother 54        lung     Heart Disease Father 75        MI     Other Cancer Brother 65        PASSED FROM PROSTATE CANCER     Genitourinary Problems Brother         enlarged prostate     Prostate Cancer Brother        Review of Systems  I personally reviewed the ROS with the patient.    Nursing Notes:   Lety Wilson LPN  2020  8:31 AM  Signed  Chief Complaint   Patient presents with     RECHECK     F/U elevated PSA       Initial /64 (BP Location: Right arm, Patient Position: Sitting, Cuff Size: Adult Regular)   Pulse 56   Temp 97.3  F (36.3  C) (Temporal)   Resp 16   Wt 92.9 kg (204 lb 12.8 oz)   BMI 28.56 kg/m   Estimated body mass index is 28.56 kg/m  as calculated from the following:    Height as of 20: 1.803 m (5' 11\").    Weight as of this encounter: 92.9 kg (204 lb 12.8 oz).  Medication Reconciliation: Completed     Review of Systems:    Weight loss:    No     Recent fever/chills:  No   Night sweats:   No  Current skin rash:  No   Recent hair loss:  No  Heat intolerance:  No   Cold intolerance:  No  Chest pain:   No   Palpitations:   No  Shortness of breath:  No   Wheezing:   No  Constipation: "    No   Diarrhea:   No   Nausea:   No   Vomiting:   No   Kidney/side pain:  No   Back pain:   No  Frequent headaches:  No   Dizziness:     No  Leg swelling:   No   Calf pain:    No          Lety Wilson LPN      Physical Exam  Vitals:    08/27/20 0820   BP: 102/64   BP Location: Right arm   Patient Position: Sitting   Cuff Size: Adult Regular   Pulse: 56   Resp: 16   Temp: 97.3  F (36.3  C)   TempSrc: Temporal   Weight: 92.9 kg (204 lb 12.8 oz)   Constitutional: No acute distress.  Alert and cooperative   Head: NCAT  Eyes: Conjunctivae normal  Cardiovascular: Regular rate.  Pulmonary/Chest: Respirations are even and non-labored bilaterally, no audible wheezing  Abdominal: Soft. No distension, tenderness, masses or guarding.   Neurological: A + O x 3.  Cranial Nerves II-XII grossly intact.  Extremities: TAMRA x 4, Warm. No clubbing.  No cyanosis.    Skin: Pink, warm and dry.  No visible rashes noted.  Psychiatric:  Normal mood and affect  Back:  No left CVA tenderness.  No right CVA tenderness.  Genitourinary:  Nonpalpable bladder  Prostate:          40 grams, symmetric, no nodules or induration    Labs  Results for ZAN LOPEZ (MRN [id number]) as of 9/11/2019 08:43   1/8/2015 11:05 9/1/2016 15:15 4/27/2018 15:41 8/20/2019 14:27   PSA 3.37 3.78 3.84 4.82 (H)     Pathology  TRUS Biopsy  10/8/2019  12 cores negative  46 g    Assessment  Mr. Lopez is a 64 year old male who follows up with persistently elevated PSA with a very strong family history of prostate cancer.  I explained to the patient that I am being a bit more aggressive with his specific care as his PSA is elevated but not dramatically.  I explained that his family history is a significant factor affecting decision making and I have recommended we proceed to a prostate MRI.    Plan  Prostate MRI

## 2020-09-01 ENCOUNTER — HOSPITAL ENCOUNTER (OUTPATIENT)
Dept: MRI IMAGING | Facility: OTHER | Age: 65
Discharge: HOME | End: 2020-09-01
Attending: UROLOGY | Admitting: UROLOGY
Payer: COMMERCIAL

## 2020-09-01 DIAGNOSIS — R97.20 ELEVATED PSA: ICD-10-CM

## 2020-09-01 PROCEDURE — A9575 INJ GADOTERATE MEGLUMI 0.1ML: HCPCS | Performed by: UROLOGY

## 2020-09-01 PROCEDURE — 25500064 ZZH RX 255 OP 636: Performed by: UROLOGY

## 2020-09-01 PROCEDURE — 72197 MRI PELVIS W/O & W/DYE: CPT

## 2020-09-01 RX ADMIN — GADOTERATE MEGLUMINE 20 ML: 376.9 INJECTION INTRAVENOUS at 09:18

## 2020-09-03 ENCOUNTER — OFFICE VISIT (OUTPATIENT)
Dept: UROLOGY | Facility: OTHER | Age: 65
End: 2020-09-03
Attending: UROLOGY
Payer: COMMERCIAL

## 2020-09-03 VITALS
BODY MASS INDEX: 28.26 KG/M2 | RESPIRATION RATE: 12 BRPM | WEIGHT: 202.6 LBS | DIASTOLIC BLOOD PRESSURE: 86 MMHG | HEART RATE: 56 BPM | SYSTOLIC BLOOD PRESSURE: 136 MMHG

## 2020-09-03 DIAGNOSIS — R97.20 ELEVATED PSA: ICD-10-CM

## 2020-09-03 PROCEDURE — 99214 OFFICE O/P EST MOD 30 MIN: CPT | Performed by: UROLOGY

## 2020-09-03 RX ORDER — CIPROFLOXACIN 500 MG/1
TABLET, FILM COATED ORAL
Qty: 6 TABLET | Refills: 0 | Status: SHIPPED | OUTPATIENT
Start: 2020-09-03 | End: 2021-01-12

## 2020-09-03 RX ORDER — GENTAMICIN SULFATE 60 MG/50ML
120 INJECTION, SOLUTION INTRAVENOUS
Status: CANCELLED | OUTPATIENT
Start: 2020-09-04

## 2020-09-03 ASSESSMENT — PAIN SCALES - GENERAL: PAINLEVEL: NO PAIN (0)

## 2020-09-03 NOTE — PROGRESS NOTES
PCP:  Dr Lee    Type of Visit  EST    Chief Complaint  Elevated PSA    HPI  Mr. Corona is a 64 year old male who follows up with an elevated PSA.  He does have a significant family history of prostate cancer: brother diagnosed at 62 and  from prostate cancer at 65, and another brother diagnosed at age 70.  The patient underwent a biopsy last October which was negative for prostate cancer.  The patient underwent a repeat PSA last week revealing a continued upward trend.  He denies acute urinary symptoms or dysuria at the time of the PSA collection.  Because of this I recommended an MRI of the prostate which he recently underwent.  MRI revealed 2 lesions clinically consistent with low risk tumors.  He follows up with his wife today to discuss the results and also discuss the next plans in work-up.      Family History  Family History   Problem Relation Age of Onset     Cancer Mother 54        lung     Heart Disease Father 75        MI     Other Cancer Brother 65        PASSED FROM PROSTATE CANCER     Genitourinary Problems Brother         enlarged prostate     Prostate Cancer Brother        Review of Systems  I personally reviewed the ROS with the patient.    Nursing Notes:   Skylar Mancia RN  9/3/2020 11:52 AM  Signed  Review of Systems:    Weight loss:    No     Recent fever/chills:  No   Night sweats:   No  Current skin rash:  No   Recent hair loss:  No  Heat intolerance:  No   Cold intolerance:  No  Chest pain:   No   Palpitations:   No  Shortness of breath:  No   Wheezing:   No  Constipation:    No   Diarrhea:   No   Nausea:   No   Vomiting:   No   Kidney/side pain:  No   Back pain:   No  Frequent headaches:  No   Dizziness:     No  Leg swelling:   No   Calf pain:    No      Physical Exam  Vitals:    20 1150   BP: 136/86   BP Location: Left arm   Patient Position: Sitting   Cuff Size: Adult Regular   Pulse: 56   Resp: 12   Weight: 91.9 kg (202 lb 9.6 oz)   Constitutional: No acute distress.   Alert and cooperative   Head: NCAT  Eyes: Conjunctivae normal  Cardiovascular: Regular rate.  Pulmonary/Chest: Respirations are even and non-labored bilaterally, no audible wheezing  Abdominal: Soft. No distension, tenderness, masses or guarding.   Neurological: A + O x 3.  Cranial Nerves II-XII grossly intact.  Extremities: TAMRA x 4, Warm. No clubbing.  No cyanosis.    Skin: Pink, warm and dry.  No visible rashes noted.  Psychiatric:  Normal mood and affect  Back:  No left CVA tenderness.  No right CVA tenderness.  Genitourinary:  Nonpalpable bladder  Prostate:          40 grams, symmetric, no nodules or induration    Labs  Results for ZAN LOPEZ (MRN [id number]) as of 9/3/2020 12:10   8/21/2020 08:45   PSA 5.23 (H)     Results for ZAN LOPEZ (MRN [id number]) as of 9/11/2019 08:43   1/8/2015 11:05 9/1/2016 15:15 4/27/2018 15:41 8/20/2019 14:27   PSA 3.37 3.78 3.84 4.82 (H)     Pathology  TRUS Biopsy  10/8/2019  12 cores negative  46 g    Assessment  Mr. Lopez is a 64 year old male who follows up with persistently elevated PSA with a very strong family history of prostate cancer.  I explained to the patient that I am being a bit more aggressive with his specific care as his PSA is elevated but not dramatically.  I explained that his family history is a significant factor affecting decision making and I have recommended we proceed to a prostate MRI.    We discussed additional options for work-up including serial PSA monitoring, urine testing in the form of Select MDx, repeat ultrasound-guided prostate biopsy, MRI guided biopsy.    I explained to the patient that an elevated PSA is a marker of risk of prostate cancer and a prostate biopsy would be the next step in diagnosis.    I explained that sampling error can occur with any biopsy and there is a risk of potentially missing a cancer that may be present.    I discussed the risks, benefits, and alternatives to prostate biopsy, including hematuria,  "hematochezia, and hematospermia.  I also discussed the risk of diagnosing a clinically-insignificant prostate cancer.  I discussed the risks of sepsis, which can be minimized by prophylactic antibiotics.     Plan  Recommended \"transrectal MRI-guided biopsy of the prostate\" to be performed in the OR under MAC sedation   Gentamicin 120mg IM prior to the biopsy.   Ciprofloxacin 500mg po bid for 3 days to start the day prior to biopsy.   Patient denies taking antiplatelets or anticoagulant medication.  "

## 2020-10-03 ENCOUNTER — ALLIED HEALTH/NURSE VISIT (OUTPATIENT)
Dept: FAMILY MEDICINE | Facility: OTHER | Age: 65
End: 2020-10-03
Attending: UROLOGY
Payer: COMMERCIAL

## 2020-10-03 DIAGNOSIS — R97.20 ELEVATED PSA: ICD-10-CM

## 2020-10-03 PROCEDURE — 99207 PR NO CHARGE NURSE ONLY: CPT

## 2020-10-03 PROCEDURE — U0003 INFECTIOUS AGENT DETECTION BY NUCLEIC ACID (DNA OR RNA); SEVERE ACUTE RESPIRATORY SYNDROME CORONAVIRUS 2 (SARS-COV-2) (CORONAVIRUS DISEASE [COVID-19]), AMPLIFIED PROBE TECHNIQUE, MAKING USE OF HIGH THROUGHPUT TECHNOLOGIES AS DESCRIBED BY CMS-2020-01-R: HCPCS | Mod: ZL | Performed by: UROLOGY

## 2020-10-03 PROCEDURE — C9803 HOPD COVID-19 SPEC COLLECT: HCPCS

## 2020-10-04 LAB
SARS-COV-2 RNA SPEC QL NAA+PROBE: NOT DETECTED
SPECIMEN SOURCE: NORMAL

## 2020-10-05 ENCOUNTER — ANESTHESIA EVENT (OUTPATIENT)
Dept: SURGERY | Facility: OTHER | Age: 65
End: 2020-10-05
Payer: COMMERCIAL

## 2020-10-05 RX ORDER — FENTANYL CITRATE 50 UG/ML
25-50 INJECTION, SOLUTION INTRAMUSCULAR; INTRAVENOUS
Status: CANCELLED | OUTPATIENT
Start: 2020-10-05

## 2020-10-06 ENCOUNTER — ANESTHESIA (OUTPATIENT)
Dept: SURGERY | Facility: OTHER | Age: 65
End: 2020-10-06
Payer: COMMERCIAL

## 2020-10-06 ENCOUNTER — HOSPITAL ENCOUNTER (OUTPATIENT)
Facility: OTHER | Age: 65
Discharge: HOME | End: 2020-10-06
Attending: UROLOGY | Admitting: UROLOGY
Payer: COMMERCIAL

## 2020-10-06 VITALS
RESPIRATION RATE: 16 BRPM | HEART RATE: 56 BPM | TEMPERATURE: 96.7 F | BODY MASS INDEX: 28.26 KG/M2 | HEIGHT: 71 IN | DIASTOLIC BLOOD PRESSURE: 86 MMHG | OXYGEN SATURATION: 99 % | SYSTOLIC BLOOD PRESSURE: 157 MMHG

## 2020-10-06 DIAGNOSIS — R97.20 ELEVATED PSA: ICD-10-CM

## 2020-10-06 PROCEDURE — 250N000009 HC RX 250: Performed by: NURSE ANESTHETIST, CERTIFIED REGISTERED

## 2020-10-06 PROCEDURE — 250N000009 HC RX 250: Performed by: UROLOGY

## 2020-10-06 PROCEDURE — 55700 PR BIOPSY OF PROSTATE,NEEDLE/PUNCH: CPT | Performed by: UROLOGY

## 2020-10-06 PROCEDURE — 76942 ECHO GUIDE FOR BIOPSY: CPT | Mod: 26 | Performed by: UROLOGY

## 2020-10-06 PROCEDURE — 370N000002 HC ANESTHESIA TECHNICAL FEE, EACH ADDTL 15 MIN: Performed by: UROLOGY

## 2020-10-06 PROCEDURE — 55700 AS BIOPSY PROSTATE NEEDLE/PUNCH: CPT | Performed by: NURSE ANESTHETIST, CERTIFIED REGISTERED

## 2020-10-06 PROCEDURE — 999N000136 HC STATISTIC PRE PROC ASSESS II: Performed by: UROLOGY

## 2020-10-06 PROCEDURE — 250N000011 HC RX IP 250 OP 636: Performed by: UROLOGY

## 2020-10-06 PROCEDURE — 258N000003 HC RX IP 258 OP 636: Performed by: UROLOGY

## 2020-10-06 PROCEDURE — 360N000038 HC SURGERY LEVEL 6 1ST 30 MIN: Performed by: UROLOGY

## 2020-10-06 PROCEDURE — 761N000007 HC RECOVERY PHASE 2 EACH 15 MINS: Performed by: UROLOGY

## 2020-10-06 PROCEDURE — 88305 TISSUE EXAM BY PATHOLOGIST: CPT

## 2020-10-06 PROCEDURE — 370N000001 HC ANESTHESIA TECHNICAL FEE, 1ST 30 MIN: Performed by: UROLOGY

## 2020-10-06 PROCEDURE — 272N000001 HC OR GENERAL SUPPLY STERILE: Performed by: UROLOGY

## 2020-10-06 PROCEDURE — 250N000011 HC RX IP 250 OP 636: Performed by: NURSE ANESTHETIST, CERTIFIED REGISTERED

## 2020-10-06 RX ORDER — FENTANYL CITRATE 50 UG/ML
25-50 INJECTION, SOLUTION INTRAMUSCULAR; INTRAVENOUS
Status: DISCONTINUED | OUTPATIENT
Start: 2020-10-06 | End: 2020-10-06 | Stop reason: HOSPADM

## 2020-10-06 RX ORDER — GENTAMICIN SULFATE 60 MG/50ML
120 INJECTION, SOLUTION INTRAVENOUS
Status: COMPLETED | OUTPATIENT
Start: 2020-10-06 | End: 2020-10-06

## 2020-10-06 RX ORDER — LIDOCAINE HYDROCHLORIDE 20 MG/ML
JELLY TOPICAL PRN
Status: DISCONTINUED | OUTPATIENT
Start: 2020-10-06 | End: 2020-10-06 | Stop reason: HOSPADM

## 2020-10-06 RX ORDER — ONDANSETRON 2 MG/ML
4 INJECTION INTRAMUSCULAR; INTRAVENOUS EVERY 30 MIN PRN
Status: DISCONTINUED | OUTPATIENT
Start: 2020-10-06 | End: 2020-10-06 | Stop reason: HOSPADM

## 2020-10-06 RX ORDER — PROPOFOL 10 MG/ML
INJECTION, EMULSION INTRAVENOUS PRN
Status: DISCONTINUED | OUTPATIENT
Start: 2020-10-06 | End: 2020-10-06

## 2020-10-06 RX ORDER — LIDOCAINE HYDROCHLORIDE 20 MG/ML
INJECTION, SOLUTION INFILTRATION; PERINEURAL PRN
Status: DISCONTINUED | OUTPATIENT
Start: 2020-10-06 | End: 2020-10-06

## 2020-10-06 RX ORDER — ONDANSETRON 4 MG/1
4 TABLET, ORALLY DISINTEGRATING ORAL EVERY 30 MIN PRN
Status: DISCONTINUED | OUTPATIENT
Start: 2020-10-06 | End: 2020-10-06 | Stop reason: HOSPADM

## 2020-10-06 RX ORDER — SODIUM CHLORIDE 9 MG/ML
INJECTION, SOLUTION INTRAVENOUS CONTINUOUS
Status: DISCONTINUED | OUTPATIENT
Start: 2020-10-06 | End: 2020-10-06 | Stop reason: HOSPADM

## 2020-10-06 RX ORDER — NALOXONE HYDROCHLORIDE 0.4 MG/ML
.1-.4 INJECTION, SOLUTION INTRAMUSCULAR; INTRAVENOUS; SUBCUTANEOUS
Status: DISCONTINUED | OUTPATIENT
Start: 2020-10-06 | End: 2020-10-06 | Stop reason: HOSPADM

## 2020-10-06 RX ORDER — SODIUM CHLORIDE, SODIUM LACTATE, POTASSIUM CHLORIDE, CALCIUM CHLORIDE 600; 310; 30; 20 MG/100ML; MG/100ML; MG/100ML; MG/100ML
INJECTION, SOLUTION INTRAVENOUS CONTINUOUS
Status: DISCONTINUED | OUTPATIENT
Start: 2020-10-06 | End: 2020-10-06 | Stop reason: HOSPADM

## 2020-10-06 RX ORDER — PROPOFOL 10 MG/ML
INJECTION, EMULSION INTRAVENOUS CONTINUOUS PRN
Status: DISCONTINUED | OUTPATIENT
Start: 2020-10-06 | End: 2020-10-06

## 2020-10-06 RX ORDER — MEPERIDINE HYDROCHLORIDE 50 MG/ML
12.5 INJECTION INTRAMUSCULAR; INTRAVENOUS; SUBCUTANEOUS
Status: DISCONTINUED | OUTPATIENT
Start: 2020-10-06 | End: 2020-10-06 | Stop reason: HOSPADM

## 2020-10-06 RX ORDER — LIDOCAINE 40 MG/G
CREAM TOPICAL
Status: DISCONTINUED | OUTPATIENT
Start: 2020-10-06 | End: 2020-10-06 | Stop reason: HOSPADM

## 2020-10-06 RX ADMIN — SODIUM CHLORIDE: 9 INJECTION, SOLUTION INTRAVENOUS at 08:37

## 2020-10-06 RX ADMIN — LIDOCAINE HYDROCHLORIDE 80 MG: 20 INJECTION, SOLUTION INFILTRATION; PERINEURAL at 09:25

## 2020-10-06 RX ADMIN — GENTAMICIN SULFATE 120 MG: 60 INJECTION, SOLUTION INTRAVENOUS at 09:24

## 2020-10-06 RX ADMIN — LIDOCAINE HYDROCHLORIDE 0.1 ML: 10 INJECTION, SOLUTION EPIDURAL; INFILTRATION; INTRACAUDAL; PERINEURAL at 08:32

## 2020-10-06 RX ADMIN — PROPOFOL 30 MG: 10 INJECTION, EMULSION INTRAVENOUS at 09:29

## 2020-10-06 RX ADMIN — PROPOFOL 140 MCG/KG/MIN: 10 INJECTION, EMULSION INTRAVENOUS at 09:25

## 2020-10-06 RX ADMIN — PROPOFOL 80 MG: 10 INJECTION, EMULSION INTRAVENOUS at 09:25

## 2020-10-06 NOTE — DISCHARGE INSTRUCTIONS
[facility] Procedure   Adult Discharge Orders & Instructions      For 24 hours after surgery:  1. Get plenty of rest.  A responsible adult must stay with you for at least 24 hours after you leave the hospital.   2. You may feel lightheaded.  IF so, sit for a few minutes before standing.  Have someone help you get up.   3. You may have a slight fever. Call the doctor if your fever is over 101 F (38.3 C) (taken under the tongue) or lasts longer than 24 hours.  4. You may have a dry mouth, a sore throat, muscle aches or trouble sleeping.  These should go away after 24 hours.  5. Do not make important or legal decisions.  6.   Do not drive or use heavy equipment.  If you have weakness or tingling, don't drive or use heavy equipment until this feeling goes away.                                                                                                                                                                         To contact a doctor, call    [phone]______________

## 2020-10-06 NOTE — ANESTHESIA PREPROCEDURE EVALUATION
Anesthesia Pre-Procedure Evaluation    Patient: Jose C Corona   MRN: [id number] : 1955          Preoperative Diagnosis: Elevated PSA [R97.20]    Procedure(s):  MRI guided prostate biopsy    Past Medical History:   Diagnosis Date     Mild intermittent asthma without complication 2013    Asthma action plan:  13      Mild persistent asthma 2013     Past Surgical History:   Procedure Laterality Date     BIOPSY PROSTATE TRANSRECTAL N/A 10/8/2019    Procedure: Transrectal Ultrasound Guided Biopsy of Prostate;  Surgeon: Juliano Williamson MD;  Location: [facility] OR     CIRCUMCISION       COLONOSCOPY      normal     COLONOSCOPY  2018    2 small polyps recheck 10 years per patient     EXCISE LESION FACE WITH FLAP PEDICLE Left 2018    Procedure: EXCISE LESION FACE WITH FLAP PEDICLE;  EXCISION LEFT NASAL BASAL CELL CARCINOMA WITH FROZENS AND FLAP REPAIR  1 x 0.6  3;  Surgeon: Yaneli Reeves MD;  Location: [facility]     ORTHOPEDIC SURGERY  2013    left knee meniscus repair       Anesthesia Evaluation     . Pt has had prior anesthetic.     No history of anesthetic complications          ROS/MED HX    ENT/Pulmonary:     (+)Intermittent asthma Treatment: Inhaler daily,  , . .    Neurologic:  - neg neurologic ROS     Cardiovascular:  - neg cardiovascular ROS       METS/Exercise Tolerance:  >4 METS   Hematologic:  - neg hematologic  ROS       Musculoskeletal:  - neg musculoskeletal ROS       GI/Hepatic:  - neg GI/hepatic ROS       Renal/Genitourinary:  - ROS Renal section negative       Endo:  - neg endo ROS       Psychiatric:  - neg psychiatric ROS       Infectious Disease:  - neg infectious disease ROS       Malignancy:      - no malignancy   Other:    - neg other ROS                      Physical Exam  Normal systems: cardiovascular, pulmonary and dental    Airway   Mallampati: II  TM distance: >3 FB  Neck ROM: full    Dental     Cardiovascular   Rhythm and rate: regular and normal      Pulmonary  "   breath sounds clear to auscultation            Lab Results   Component Value Date    WBC 6.6 08/21/2020    HGB 15.2 08/21/2020    HCT 44.2 08/21/2020     08/21/2020     08/21/2020    POTASSIUM 4.5 08/21/2020    CHLORIDE 107 08/21/2020    CO2 26 08/21/2020    BUN 10 08/21/2020    CR 0.89 08/21/2020    GLC 94 08/21/2020    MARINA 9.2 08/21/2020    ALBUMIN 3.9 08/21/2020    PROTTOTAL 7.7 08/21/2020    ALT 33 08/21/2020    AST 22 08/21/2020    ALKPHOS 91 08/21/2020    BILITOTAL 0.5 08/21/2020    INR 1.0 12/05/2013       Preop Vitals  BP Readings from Last 3 Encounters:   10/06/20 (!) 165/93   09/03/20 136/86   08/27/20 102/64    Pulse Readings from Last 3 Encounters:   09/03/20 56   08/27/20 56   08/06/20 57      Resp Readings from Last 3 Encounters:   10/06/20 18   09/03/20 12   08/27/20 16    SpO2 Readings from Last 3 Encounters:   10/06/20 98%   08/06/20 98%   01/21/20 97%      Temp Readings from Last 1 Encounters:   10/06/20 97.6  F (36.4  C) (Tympanic)    Ht Readings from Last 1 Encounters:   10/06/20 1.803 m (5' 11\")      Wt Readings from Last 1 Encounters:   09/03/20 91.9 kg (202 lb 9.6 oz)    Estimated body mass index is 28.26 kg/m  as calculated from the following:    Height as of this encounter: 1.803 m (5' 11\").    Weight as of 9/3/20: 91.9 kg (202 lb 9.6 oz).       Anesthesia Plan      History & Physical Review      ASA Status:  2 .    NPO Status:  > 6 hours    Plan for MAC with Propofol induction.            Postoperative Care      Consents  Anesthetic plan, risks, benefits and alternatives discussed with:  Patient..                 YAKELIN ONEILL CRNA  "

## 2020-10-06 NOTE — H&P
Type of Note  Preop    HPI  The patient is a 65 year old male who plans to undergo surgery.    Past Medical History  He  has a past medical history of Mild intermittent asthma without complication (6/26/2013) and Mild persistent asthma (6/26/2013).  Patient Active Problem List   Diagnosis     Mild intermittent asthma without complication     ACP (advance care planning)     Elevated PSA       Past Surgical History  He  has a past surgical history that includes Circumcision; orthopedic surgery (12/2013); colonoscopy (2007); colonoscopy (06/2018); Excise lesion face with flap pedicle (Left, 8/22/2018); and Biopsy prostate transrectal (N/A, 10/8/2019).    Medications    Current Facility-Administered Medications:      gentamicin (GARAMYCIN) infusion 120 mg, 120 mg, Intravenous, Pre-Op/Pre-procedure x 1 dose, Juliano Williamson MD     lactated ringers infusion, , Intravenous, Continuous, Hubert Bautista APRN CRNA     lidocaine (LMX4) cream, , Topical, Q1H PRN, Hubert Bautista APRN CRNA     lidocaine 1 % 0.1-1 mL, 0.1-1 mL, Other, Q1H PRN, Hubert Bautista APRN CRNA, 0.1 mL at 10/06/20 0832     Provider ordered ALTERNATE pre op antibiotic., 1 each, As instructed, Continuous, Juliano Williamson MD     sodium chloride (PF) 0.9% PF flush 3 mL, 3 mL, Intracatheter, q1 min prn, Hubert Bautista APRN CRNA     sodium chloride (PF) 0.9% PF flush 3 mL, 3 mL, Intracatheter, Q8H, Hubert Bautista APRN CRNA     sodium chloride 0.9% infusion, , Intravenous, Continuous, Juliano Williamson MD, Last Rate: 100 mL/hr at 10/06/20 0837    Allergies  Allergies   Allergen Reactions     Shellfish-Derived Products Rash     Also gets flushed face     Seasonal Allergies        Social History  He  reports that he has never smoked. He has never used smokeless tobacco. He reports current alcohol use. He reports that he does not use drugs.  No drug abuse.    Family History  Family History   Problem Relation Age of Onset     Cancer Mother 54        lung  "    Heart Disease Father 75        MI     Other Cancer Brother 65        PASSED FROM PROSTATE CANCER     Genitourinary Problems Brother         enlarged prostate     Prostate Cancer Brother        Review of Systems  I personally reviewed the ROS with the patient.    Recent fever/chills: No  Night sweats: No   Current skin rash: No   Recent hair loss: No   Heat intolerance: No   Cold intolerance: No   Chest pain: No   Palpitations: No   Shortness of breath: No  Wheezing: No   Constipation: No   Diarrhea: No   Nausea: No    Vomiting: No   Kidney/side pain: No   Back pain: No  Frequent headaches: No  Dizziness: No   Leg swelling: No   Calf pain: No      Physical Exam  Vitals:    10/06/20 0808   BP: (!) 165/93   Cuff Size: Adult Regular   Resp: 18   Temp: 97.6  F (36.4  C)   TempSrc: Tympanic   SpO2: 98%   Height: 1.803 m (5' 11\")     Constitutional: NAD, WDWN.   Head: NCAT  Eyes: Conjunctivae normal  Cardiovascular: Regular rate and rhythm  Pulmonary/Chest: Clear to auscultation bilaterally  Abdominal: Soft. No distension, tenderness, masses or guarding.  Neurological: A + O x 3.  Cranial Nerves II-XII grossly intact.  Extremities: TAMRA x 4, Warm. No clubbing.  No cyanosis.    Skin: Pink, warm and dry.  No rashes noted.  Psychiatric:  Normal mood and affect    Assessment & Plan  The patient is a 65 year old male who plans to undergo surgery.    "

## 2020-10-06 NOTE — ANESTHESIA CARE TRANSFER NOTE
Patient: Jose C Corona    Procedure(s):  MRI guided prostate biopsy    Diagnosis: Elevated PSA [R97.20]  Diagnosis Additional Information: No value filed.    Anesthesia Type:   MAC     Note:  Airway :Nasal Cannula  Patient transferred to:Phase II  Handoff Report: Identifed the Patient, Identified the Reponsible Provider, Reviewed the pertinent medical history, Discussed the surgical course, Reviewed Intra-OP anesthesia mangement and issues during anesthesia, Set expectations for post-procedure period and Allowed opportunity for questions and acknowledgement of understanding      Vitals: (Last set prior to Anesthesia Care Transfer)    CRNA VITALS  10/6/2020 0917 - 10/6/2020 0951      10/6/2020             Resp Rate (set):  10                Electronically Signed By: YAKELIN BILL CRNA  October 6, 2020  9:51 AM

## 2020-10-06 NOTE — ANESTHESIA POSTPROCEDURE EVALUATION
Patient: Jose C Corona    Procedure(s):  MRI guided prostate biopsy    Diagnosis:Elevated PSA [R97.20]  Diagnosis Additional Information: No value filed.    Anesthesia Type:  MAC    Note:  Anesthesia Post Evaluation    Patient location during evaluation: Phase 2  Patient participation: Able to fully participate in evaluation  Level of consciousness: awake and alert  Pain management: adequate  Airway patency: patent  Cardiovascular status: acceptable  Respiratory status: acceptable  Hydration status: acceptable  PONV: none     Anesthetic complications: None          Last vitals:  Vitals:    10/06/20 0808   BP: (!) 165/93   Resp: 18   Temp: 97.6  F (36.4  C)   SpO2: 98%         Electronically Signed By: YAKELIN BILL CRNA  October 6, 2020  9:51 AM   [facility]DL

## 2020-10-06 NOTE — OP NOTE
Preoperative diagnosis  Elevated PSA    Postoperative diagnosis  Elevated PSA    Procedure  Prostate biopsy  Transrectal ultrasound of the prostate  Transrectal ultrasound guidanceof needle biopsy  MRI guidance with Biojet software    Surgeon  Juliano Williamson MD    Surgeon(s)/Proceduralist(s) and Assistants (if any)  Surgeon(s):  Juliano Williamson MD  Circulator: Josiah Mc RN; Lety Melo RN  Scrub Person: Lian Duran; Jaqui Hurt  First Assistant: Omayra Dorantes RN  Pre-Op Nurse: Radha Campuzano RN    (EBL) Estimated blood loss (ml)  2    Anesthesia  MAC    Complications  None    Specimen  Prostate biopsy x 16 cores in 14 containers   (standard 12 core plus target lesion #1 and #2)    Indications  Mr. Rogel a 65 year old year old male with an elevated PSA.      After discussing his options, the patient decided to proceed with prostate biopsy.  Informed consent was obtained. Possible complications were discussed with the patient during his last visit including, but not limited to, hematuria, hematochezia, prostatitis, urinary tractinfection, sepsis, and urinary retention.    Exam  Prostate:   60 grams, symmetric, no nodules or induration    Procedure  The patient was positioned and prepped in a left lateral position with lower extremities flexed.  Lidocaine jelly, 2%, was injectedper rectum and gentamicin 120mg was instilled via IV. ALYSSA was performed.  The rectal ultrasound probe was slowly introduced into the rectum.  The prostate and seminal vesicles were inspected systematically using cross andsagittal views with the ultrasound.  There were not hypoechoic areas within the prostate.  The dimensions of the prostate were measured to be 38mm X 49mm X 51mm, for a calculated volume of 51g.      Using anarticulating arm and the Biojet software the target lesion #1 and #2 were biopsied x 2 each.  Using a true cut 14 Fr biopsy needle, 12 prostate cores were collected. The specific locations on the  left were thefollowing: lateral base, lateral mid, lateral apex, medial base, medial mid, and medial apex.  The right side was sampled in a similar manner.  The ultrasound probe was removed.  The patient tolerated the procedurewell.    Plan  Complete course of antibiotics  Clinic will coordinate follow up appt

## 2020-10-12 ENCOUNTER — VIRTUAL VISIT (OUTPATIENT)
Dept: UROLOGY | Facility: OTHER | Age: 65
End: 2020-10-12
Attending: UROLOGY
Payer: COMMERCIAL

## 2020-10-12 VITALS — BODY MASS INDEX: 27.2 KG/M2 | WEIGHT: 195 LBS

## 2020-10-12 DIAGNOSIS — R97.20 ELEVATED PSA: Primary | ICD-10-CM

## 2020-10-12 PROCEDURE — 99212 OFFICE O/P EST SF 10 MIN: CPT | Mod: 95 | Performed by: UROLOGY

## 2020-10-12 ASSESSMENT — PAIN SCALES - GENERAL: PAINLEVEL: NO PAIN (0)

## 2020-10-12 NOTE — PROGRESS NOTES
"Jose C Corona is a 65 year old male who is being evaluated via a billable telephone visit.      The patient has been notified of following:     \"This telephone visit will be conducted via a call between you and your physician/provider. We have found that certain health care needs can be provided without the need for a physical exam.  This service lets us provide the care you need with a short phone conversation.  If a prescription is necessary we can send it directly to your pharmacy.  If lab work is needed we can place an order for that and you can then stop by our lab to have the test done at a later time.    If during the course of the call the physician/provider feels a telephone visit is not appropriate, you will not be charged for this service.\"     Patient has given verbal consent for Telephone visit?  Yes    Jose C Corona complains of    Chief Complaint   Patient presents with     Follow Up     follow up after TRUS     I have reviewed and updated the patient's Past Medical History, Social History, Family History and Medication List.    ALLERGIES  Shellfish-derived products and Seasonal allergies    Notes and Assessment    Chief Complaint  Elevated PSA    HPI  Mr. Corona is a 65 year old male who follows up over the phone after MRI guided prostate biopsy last week.  Patient has recovered and has no complaints.  Patient is calling to discuss pathology report.    He does have a significant family history of prostate cancer: brother diagnosed at 62 and  from prostate cancer at 65, and another brother diagnosed at age 70.  The patient underwent a biopsy last October which was negative for prostate cancer.      Family History  Family History   Problem Relation Age of Onset     Cancer Mother 54        lung     Heart Disease Father 75        MI     Other Cancer Brother 65        PASSED FROM PROSTATE CANCER     Genitourinary Problems Brother         enlarged prostate     Prostate Cancer Brother        Review " of Systems  I personally reviewed the ROS with the patient.    Nursing Notes:   Krystal Curran, LPN  10/12/2020  3:23 PM  Signed  Pt presents to clinic for billable telephone call to follow up after TRUS      Physical Exam  Vitals:    10/12/20 1523   Weight: 88.5 kg (195 lb)     Labs  Results for ZAN LOPEZ (MRN [id number]) as of 9/3/2020 12:10   8/21/2020 08:45   PSA 5.23 (H)     Results for ZAN LOPEZ (MRN [id number]) as of 9/11/2019 08:43   1/8/2015 11:05 9/1/2016 15:15 4/27/2018 15:41 8/20/2019 14:27   PSA 3.37 3.78 3.84 4.82 (H)     Imaging  MRI prostate  9/1/2020  IMPRESSION:  2 indeterminate lesions within the left prostate.  Assessment: PI-RADS 3  45g    Pathology  MRI guided biopsy  10/6/2020  12 core plus 2 lesions all negative  51g    ^^^^^^^^^^^^^^^^^^^^^^^^^^^^^    TRUS Biopsy  10/8/2019  12 cores negative  46 g    Assessment  Mr. Lopez is a 65 year old male who follows up with persistently elevated PSA with a very strong family history of prostate cancer who recently underwent MRI guided prostate biopsy which was negative.  The patient has now undergone 2 prostate biopsies 1 year apart, 1 of which was MRI guided.  All samples have been negative.    Plan  Follow-up with PCP for annual labs including PSA.  If the PSA becomes a concern I can see him back at any time to discuss.        Juliano Williamson MD      Phone call duration: 11 minutes

## 2021-01-12 ENCOUNTER — OFFICE VISIT (OUTPATIENT)
Dept: DERMATOLOGY | Facility: OTHER | Age: 66
End: 2021-01-12
Attending: DERMATOLOGY
Payer: COMMERCIAL

## 2021-01-12 VITALS
HEIGHT: 72 IN | HEART RATE: 61 BPM | SYSTOLIC BLOOD PRESSURE: 120 MMHG | BODY MASS INDEX: 26.68 KG/M2 | WEIGHT: 197 LBS | OXYGEN SATURATION: 98 % | DIASTOLIC BLOOD PRESSURE: 70 MMHG | TEMPERATURE: 96.2 F

## 2021-01-12 DIAGNOSIS — D22.9 MULTIPLE BENIGN NEVI: ICD-10-CM

## 2021-01-12 DIAGNOSIS — Z12.83 SCREENING FOR SKIN CANCER: ICD-10-CM

## 2021-01-12 DIAGNOSIS — Z85.828 HISTORY OF NONMELANOMA SKIN CANCER: Primary | ICD-10-CM

## 2021-01-12 PROCEDURE — 99212 OFFICE O/P EST SF 10 MIN: CPT | Performed by: DERMATOLOGY

## 2021-01-12 ASSESSMENT — MIFFLIN-ST. JEOR: SCORE: 1726.59

## 2021-01-12 ASSESSMENT — PAIN SCALES - GENERAL: PAINLEVEL: NO PAIN (0)

## 2021-01-12 NOTE — LETTER
2021       RE: Jose C Lopez  [address] [facility] [address]     Dear Colleague,    Thank you for referring your patient, Jose C Lopez, to the [facility]. Please see a copy of my visit note below.    Visit Date:   2021      SUBJECTIVE:  Jose C comes in because in the past he has had a basal cell on his nose treated in the past by Moh's, and he comes in for a followup.      OBJECTIVE:  Shows a healthy gentleman in no distress.  Present on the left side is some scarring related to repair after Mohs surgery.      We checked his face, neck, chest, arms, legs, hands, feet, and we did not see any concerning lesions.  He was not aware of any bleeding, crusting or similar to the skin cancer lesions that he had before.      ASSESSMENT:  No worrisome lesions.      PLAN:  Reassured.  Return again in 1 year.         DAVIDSON COVINGTON MD             D: 2021   T: 2021   MT: BOOKER      Name:     JOSE C LOPEZ   MRN:      [id number]        Account:      [id number]   :      1955           Visit Date:   2021      Document: [id number]          Again, thank you for allowing me to participate in the care of your patient.      Sincerely,    DAVIDSON Covington MD

## 2021-01-12 NOTE — PROGRESS NOTES
Visit Date:   2021      SUBJECTIVE:  Jose C comes in because in the past he has had a basal cell on his nose treated in the past by Moh's, and he comes in for a followup.      OBJECTIVE:  Shows a healthy gentleman in no distress.  Present on the left side is some scarring related to repair after Mohs surgery.      We checked his face, neck, chest, arms, legs, hands, feet, and we did not see any concerning lesions.  He was not aware of any bleeding, crusting or similar to the skin cancer lesions that he had before.      ASSESSMENT:  No worrisome lesions.      PLAN:  Reassured.  Return again in 1 year.         DAVIDSON COVINGTON MD             D: 2021   T: 2021   MT: BOOKER      Name:     JOSE C LOPEZ   MRN:      [id number]        Account:      [id number]   :      1955           Visit Date:   2021      Document: [id number]

## 2021-01-12 NOTE — NURSING NOTE
Chief Complaint   Patient presents with     Skin Check       Initial /70 (BP Location: Left arm, Patient Position: Chair, Cuff Size: Adult Regular)   Pulse 61   Temp 96.2  F (35.7  C) (Tympanic)   Ht 1.829 m (6')   Wt 89.4 kg (197 lb)   SpO2 98%   BMI 26.72 kg/m   Estimated body mass index is 26.72 kg/m  as calculated from the following:    Height as of this encounter: 1.829 m (6').    Weight as of this encounter: 89.4 kg (197 lb).  Medication Reconciliation: complete  VASU GRACE LPN

## 2021-04-18 ENCOUNTER — HEALTH MAINTENANCE LETTER (OUTPATIENT)
Age: 66
End: 2021-04-18

## 2021-08-25 DIAGNOSIS — J45.30 MILD PERSISTENT ASTHMA WITHOUT COMPLICATION: ICD-10-CM

## 2021-08-25 NOTE — PROGRESS NOTES
SUBJECTIVE:   CC: Jose C Corona is an 66 year old male who presents for preventative health visit.         HPI   Patient is a 67 yo male presents today for routine annual physical. He has no complaints today, he is very active, works outside the home. He golfs weekly and is outside a lot doing projects. He does endorse low back pain after a weekend of being active. Discussed options for pain management and possible physical therapy.             Today's PHQ-2 Score:   PHQ-2 ( 1999 [employer]) 8/31/2021   Q1: Little interest or pleasure in doing things 0   Q2: Feeling down, depressed or hopeless 0   PHQ-2 Score 0       Abuse: Current or Past(Physical, Sexual or Emotional)- No  Do you feel safe in your environment? Yes    Have you ever done Advance Care Planning? (For example, a Health Directive, POLST, or a discussion with a medical provider or your loved ones about your wishes): Yes, advance care planning is on file.    Social History     Tobacco Use     Smoking status: Never Smoker     Smokeless tobacco: Never Used   Substance Use Topics     Alcohol use: Yes     Comment: weekends, beer     If you drink alcohol do you typically have >3 drinks per day or >7 drinks per week? No    Alcohol Use 8/31/2021   Prescreen: >3 drinks/day or >7 drinks/week? No       Last PSA:   PSA   Date Value Ref Range Status   08/21/2020 5.23 (H) 0 - 4 ug/L Final     Comment:     Assay Method:  Chemiluminescence using Siemens Vista analyzer       Reviewed orders with patient. Reviewed health maintenance and updated orders accordingly - Yes  BP Readings from Last 3 Encounters:   08/31/21 126/80   01/12/21 120/70   10/06/20 (!) 157/86    Wt Readings from Last 3 Encounters:   08/31/21 93 kg (205 lb)   01/12/21 89.4 kg (197 lb)   10/12/20 88.5 kg (195 lb)                  Patient Active Problem List   Diagnosis     Mild intermittent asthma without complication     ACP (advance care planning)     Elevated PSA     Past Surgical History:   Procedure  Laterality Date     BIOPSY PROSTATE TRANSRECTAL N/A 10/8/2019    Procedure: Transrectal Ultrasound Guided Biopsy of Prostate;  Surgeon: Juliano Williamson MD;  Location: [facility] OR     CIRCUMCISION       COLONOSCOPY  2007    normal     COLONOSCOPY  06/2018    2 small polyps recheck 10 years per patient     EXCISE LESION FACE WITH FLAP PEDICLE Left 8/22/2018    Procedure: EXCISE LESION FACE WITH FLAP PEDICLE;  EXCISION LEFT NASAL BASAL CELL CARCINOMA WITH FROZENS AND FLAP REPAIR  1 x 0.6  3;  Surgeon: Yaneli Reeves MD;  Location: [facility] OR     MRI BIOPSY PROSTATE N/A 10/6/2020    Procedure: MRI guided prostate biopsy;  Surgeon: Juliano Williamson MD;  Location:  OR     ORTHOPEDIC SURGERY  12/2013    left knee meniscus repair       Social History     Tobacco Use     Smoking status: Never Smoker     Smokeless tobacco: Never Used   Substance Use Topics     Alcohol use: Yes     Comment: weekends, beer     Family History   Problem Relation Age of Onset     Cancer Mother 54        lung     Heart Disease Father 75        MI     Other Cancer Brother 65        PASSED FROM PROSTATE CANCER     Genitourinary Problems Brother         enlarged prostate     Prostate Cancer Brother          Current Outpatient Medications   Medication Sig Dispense Refill     albuterol (PROAIR HFA/PROVENTIL HFA/VENTOLIN HFA) 108 (90 Base) MCG/ACT inhaler Inhale 2 puffs into the lungs every 6 hours 1 Inhaler 5     FLOVENT  MCG/ACT inhaler INHALE 1 PUFF INTO THE LUNGS TWICE DAILY 12 g 11     Allergies   Allergen Reactions     Shellfish-Derived Products Rash     Also gets flushed face     Seasonal Allergies        Reviewed and updated as needed this visit by clinical staff  Tobacco  Allergies  Meds   Med Hx  Surg Hx  Fam Hx  Soc Hx        Reviewed and updated as needed this visit by Provider                Past Medical History:   Diagnosis Date     Mild intermittent asthma without complication 6/26/2013    Asthma action plan:  6/26/13      Mild  persistent asthma 6/26/2013      Past Surgical History:   Procedure Laterality Date     BIOPSY PROSTATE TRANSRECTAL N/A 10/8/2019    Procedure: Transrectal Ultrasound Guided Biopsy of Prostate;  Surgeon: Juliano Williamson MD;  Location:  OR     CIRCUMCISION       COLONOSCOPY  2007    normal     COLONOSCOPY  06/2018    2 small polyps recheck 10 years per patient     EXCISE LESION FACE WITH FLAP PEDICLE Left 8/22/2018    Procedure: EXCISE LESION FACE WITH FLAP PEDICLE;  EXCISION LEFT NASAL BASAL CELL CARCINOMA WITH FROZENS AND FLAP REPAIR  1 x 0.6  3;  Surgeon: Yaneli Reeves MD;  Location: [facility] OR     MRI BIOPSY PROSTATE N/A 10/6/2020    Procedure: MRI guided prostate biopsy;  Surgeon: Juliano Williamson MD;  Location:  OR     ORTHOPEDIC SURGERY  12/2013    left knee meniscus repair       Review of Systems  CONSTITUTIONAL: NEGATIVE for fever, chills, change in weight  INTEGUMENTARY/SKIN: NEGATIVE for worrisome rashes, moles or lesions  EYES: NEGATIVE for vision changes or irritation  ENT: NEGATIVE for ear, mouth and throat problems  RESP: NEGATIVE for significant cough or SOB  CV: NEGATIVE for chest pain, palpitations or peripheral edema  GI: NEGATIVE for nausea, abdominal pain, heartburn, or change in bowel habits   male: negative for dysuria, hematuria, decreased urinary stream, erectile dysfunction, urethral discharge  MUSCULOSKELETAL: NEGATIVE for significant arthralgias or myalgia  NEURO: NEGATIVE for weakness, dizziness or paresthesias  PSYCHIATRIC: NEGATIVE for changes in mood or affect    OBJECTIVE:   /80 (BP Location: Left arm, Patient Position: Chair, Cuff Size: Adult Regular)   Pulse 51   Temp (!) 96.4  F (35.8  C) (Tympanic)   Ht 1.829 m (6')   Wt 93 kg (205 lb)   SpO2 98%   BMI 27.80 kg/m      Physical Exam  GENERAL: healthy, alert and no distress  EYES: Eyes grossly normal to inspection, PERRL and conjunctivae and sclerae normal  HENT: ear canals and TM's normal, nose and mouth without  ulcers or lesions  NECK: no adenopathy, no asymmetry, masses, or scars and thyroid normal to palpation  RESP: lungs clear to auscultation - no rales, rhonchi or wheezes  CV: regular rate and rhythm, normal S1 S2, no S3 or S4, no murmur, click or rub, no peripheral edema and peripheral pulses strong  ABDOMEN: soft, nontender, no hepatosplenomegaly, no masses and bowel sounds normal  MS: no gross musculoskeletal defects noted, no edema  NEURO: Normal strength and tone, mentation intact and speech normal  PSYCH: mentation appears normal, affect normal/bright        ASSESSMENT/PLAN:   Jose C was seen today for physical.    Diagnoses and all orders for this visit:    Routine general medical examination at a health care facility  -     CBC with platelets and differential; Future  -     Comprehensive metabolic panel (BMP + Alb, Alk Phos, ALT, AST, Total. Bili, TP); Future  -     CBC with platelets and differential  -     Comprehensive metabolic panel (BMP + Alb, Alk Phos, ALT, AST, Total. Bili, TP)    Screening for prostate cancer  -     PSA, screen; Future  -     PSA, screen    Screening for hyperlipidemia  -     Lipid Profile (Chol, Trig, HDL, LDL calc); Future  -     Lipid Profile (Chol, Trig, HDL, LDL calc)    Lumbar back pain  -     Cancel: XR LUMBAR SPINE G/E 4 VW (Clinic Performed); Future        Patient has been advised of split billing requirements and indicates understanding: Yes  COUNSELING:   Reviewed preventive health counseling, as reflected in patient instructions    Estimated body mass index is 27.8 kg/m  as calculated from the following:    Height as of this encounter: 1.829 m (6').    Weight as of this encounter: 93 kg (205 lb).         He reports that he has never smoked. He has never used smokeless tobacco.      Counseling Resources:  ATP IV Guidelines  Pooled Cohorts Equation Calculator  FRAX Risk Assessment  ICSI Preventive Guidelines  Dietary Guidelines for Americans, 2010  USDA's MyPlate  ASA  Prophylaxis  Lung CA Screening      Holly Barber RN - NP Student     Case and care plan discussed with student.  I attest and agree to the documentation/evaluation as noted above.      Rosalio Lee,   [facility]

## 2021-08-26 RX ORDER — DEXAMETHASONE 4 MG/1
TABLET ORAL
Qty: 12 G | Refills: 11 | Status: SHIPPED | OUTPATIENT
Start: 2021-08-26 | End: 2022-06-28

## 2021-08-26 NOTE — TELEPHONE ENCOUNTER
flovent      Last Written Prescription Date:  8/6/20  Last Fill Quantity: 12g,   # refills: 11  Last Office Visit: 8/6/20  Future Office visit:    Next 5 appointments (look out 90 days)    Aug 31, 2021  2:00 PM  (Arrive by 1:45 PM)  PHYSICAL with Rosalio Lee DO  [facility] ([facility] ) [address]  [place] [id number] [phone]

## 2021-08-31 ENCOUNTER — ANCILLARY PROCEDURE (OUTPATIENT)
Dept: GENERAL RADIOLOGY | Facility: OTHER | Age: 66
End: 2021-08-31
Attending: INTERNAL MEDICINE
Payer: COMMERCIAL

## 2021-08-31 ENCOUNTER — OFFICE VISIT (OUTPATIENT)
Dept: INTERNAL MEDICINE | Facility: OTHER | Age: 66
End: 2021-08-31
Attending: INTERNAL MEDICINE
Payer: COMMERCIAL

## 2021-08-31 VITALS
SYSTOLIC BLOOD PRESSURE: 126 MMHG | TEMPERATURE: 96.4 F | HEART RATE: 51 BPM | OXYGEN SATURATION: 98 % | WEIGHT: 205 LBS | BODY MASS INDEX: 27.77 KG/M2 | HEIGHT: 72 IN | DIASTOLIC BLOOD PRESSURE: 80 MMHG

## 2021-08-31 DIAGNOSIS — M54.50 LUMBAR BACK PAIN: ICD-10-CM

## 2021-08-31 DIAGNOSIS — Z00.00 ROUTINE GENERAL MEDICAL EXAMINATION AT A HEALTH CARE FACILITY: Primary | ICD-10-CM

## 2021-08-31 DIAGNOSIS — Z13.220 SCREENING FOR HYPERLIPIDEMIA: ICD-10-CM

## 2021-08-31 DIAGNOSIS — Z12.5 SCREENING FOR PROSTATE CANCER: ICD-10-CM

## 2021-08-31 LAB
ALBUMIN SERPL-MCNC: 3.8 G/DL (ref 3.4–5)
ALP SERPL-CCNC: 87 U/L (ref 40–150)
ALT SERPL W P-5'-P-CCNC: 41 U/L (ref 0–70)
ANION GAP SERPL CALCULATED.3IONS-SCNC: 3 MMOL/L (ref 3–14)
AST SERPL W P-5'-P-CCNC: 23 U/L (ref 0–45)
BASOPHILS # BLD AUTO: 0.1 10E3/UL (ref 0–0.2)
BASOPHILS NFR BLD AUTO: 1 %
BILIRUB SERPL-MCNC: 0.5 MG/DL (ref 0.2–1.3)
BUN SERPL-MCNC: 9 MG/DL (ref 7–30)
CALCIUM SERPL-MCNC: 8.8 MG/DL (ref 8.5–10.1)
CHLORIDE BLD-SCNC: 109 MMOL/L (ref 94–109)
CHOLEST SERPL-MCNC: 193 MG/DL
CO2 SERPL-SCNC: 27 MMOL/L (ref 20–32)
CREAT SERPL-MCNC: 0.88 MG/DL (ref 0.66–1.25)
EOSINOPHIL # BLD AUTO: 0.2 10E3/UL (ref 0–0.7)
EOSINOPHIL NFR BLD AUTO: 4 %
ERYTHROCYTE [DISTWIDTH] IN BLOOD BY AUTOMATED COUNT: 13.1 % (ref 10–15)
FASTING STATUS PATIENT QL REPORTED: YES
GFR SERPL CREATININE-BSD FRML MDRD: >90 ML/MIN/1.73M2
GLUCOSE BLD-MCNC: 85 MG/DL (ref 70–99)
HCT VFR BLD AUTO: 44.9 % (ref 40–53)
HDLC SERPL-MCNC: 97 MG/DL
HGB BLD-MCNC: 15.5 G/DL (ref 13.3–17.7)
LDLC SERPL CALC-MCNC: 77 MG/DL
LYMPHOCYTES # BLD AUTO: 2 10E3/UL (ref 0.8–5.3)
LYMPHOCYTES NFR BLD AUTO: 33 %
MCH RBC QN AUTO: 31.8 PG (ref 26.5–33)
MCHC RBC AUTO-ENTMCNC: 34.5 G/DL (ref 31.5–36.5)
MCV RBC AUTO: 92 FL (ref 78–100)
MONOCYTES # BLD AUTO: 0.5 10E3/UL (ref 0–1.3)
MONOCYTES NFR BLD AUTO: 8 %
NEUTROPHILS # BLD AUTO: 3.3 10E3/UL (ref 1.6–8.3)
NEUTROPHILS NFR BLD AUTO: 55 %
NONHDLC SERPL-MCNC: 96 MG/DL
PLATELET # BLD AUTO: 242 10E3/UL (ref 150–450)
POTASSIUM BLD-SCNC: 4.3 MMOL/L (ref 3.4–5.3)
PROT SERPL-MCNC: 7.8 G/DL (ref 6.8–8.8)
PSA SERPL-MCNC: 6.44 UG/L (ref 0–4)
RBC # BLD AUTO: 4.88 10E6/UL (ref 4.4–5.9)
SODIUM SERPL-SCNC: 139 MMOL/L (ref 133–144)
TRIGL SERPL-MCNC: 97 MG/DL
WBC # BLD AUTO: 6 10E3/UL (ref 4–11)

## 2021-08-31 PROCEDURE — 80061 LIPID PANEL: CPT | Performed by: INTERNAL MEDICINE

## 2021-08-31 PROCEDURE — G0103 PSA SCREENING: HCPCS | Performed by: INTERNAL MEDICINE

## 2021-08-31 PROCEDURE — 72100 X-RAY EXAM L-S SPINE 2/3 VWS: CPT | Mod: TC | Performed by: RADIOLOGY

## 2021-08-31 PROCEDURE — 99214 OFFICE O/P EST MOD 30 MIN: CPT | Performed by: INTERNAL MEDICINE

## 2021-08-31 PROCEDURE — 36415 COLL VENOUS BLD VENIPUNCTURE: CPT | Performed by: INTERNAL MEDICINE

## 2021-08-31 PROCEDURE — 85025 COMPLETE CBC W/AUTO DIFF WBC: CPT | Performed by: INTERNAL MEDICINE

## 2021-08-31 PROCEDURE — 80053 COMPREHEN METABOLIC PANEL: CPT | Performed by: INTERNAL MEDICINE

## 2021-08-31 ASSESSMENT — ANXIETY QUESTIONNAIRES
4. TROUBLE RELAXING: NOT AT ALL
2. NOT BEING ABLE TO STOP OR CONTROL WORRYING: NOT AT ALL
1. FEELING NERVOUS, ANXIOUS, OR ON EDGE: NOT AT ALL
6. BECOMING EASILY ANNOYED OR IRRITABLE: NOT AT ALL
3. WORRYING TOO MUCH ABOUT DIFFERENT THINGS: NOT AT ALL
5. BEING SO RESTLESS THAT IT IS HARD TO SIT STILL: NOT AT ALL
7. FEELING AFRAID AS IF SOMETHING AWFUL MIGHT HAPPEN: NOT AT ALL
GAD7 TOTAL SCORE: 0

## 2021-08-31 ASSESSMENT — ASTHMA QUESTIONNAIRES
QUESTION_5 LAST FOUR WEEKS HOW WOULD YOU RATE YOUR ASTHMA CONTROL: COMPLETELY CONTROLLED
QUESTION_3 LAST FOUR WEEKS HOW OFTEN DID YOUR ASTHMA SYMPTOMS (WHEEZING, COUGHING, SHORTNESS OF BREATH, CHEST TIGHTNESS OR PAIN) WAKE YOU UP AT NIGHT OR EARLIER THAN USUAL IN THE MORNING: NOT AT ALL
QUESTION_2 LAST FOUR WEEKS HOW OFTEN HAVE YOU HAD SHORTNESS OF BREATH: NOT AT ALL
ACT_TOTALSCORE: 25
QUESTION_1 LAST FOUR WEEKS HOW MUCH OF THE TIME DID YOUR ASTHMA KEEP YOU FROM GETTING AS MUCH DONE AT WORK, SCHOOL OR AT HOME: NONE OF THE TIME
QUESTION_4 LAST FOUR WEEKS HOW OFTEN HAVE YOU USED YOUR RESCUE INHALER OR NEBULIZER MEDICATION (SUCH AS ALBUTEROL): NOT AT ALL

## 2021-08-31 ASSESSMENT — PATIENT HEALTH QUESTIONNAIRE - PHQ9: SUM OF ALL RESPONSES TO PHQ QUESTIONS 1-9: 0

## 2021-08-31 ASSESSMENT — PAIN SCALES - GENERAL: PAINLEVEL: NO PAIN (0)

## 2021-08-31 ASSESSMENT — MIFFLIN-ST. JEOR: SCORE: 1757.87

## 2021-08-31 NOTE — NURSING NOTE
Chief Complaint   Patient presents with     Physical       Initial /80 (BP Location: Left arm, Patient Position: Chair, Cuff Size: Adult Regular)   Pulse 51   Temp (!) 96.4  F (35.8  C) (Tympanic)   Ht 1.829 m (6')   Wt 93 kg (205 lb)   SpO2 98%   BMI 27.80 kg/m   Estimated body mass index is 27.8 kg/m  as calculated from the following:    Height as of this encounter: 1.829 m (6').    Weight as of this encounter: 93 kg (205 lb).  Medication Reconciliation: complete  MIRIAM YUAN LPN

## 2021-08-31 NOTE — LETTER
My Asthma Action Plan    Name: Jose C Corona   YOB: 1955  Date: 8/31/2021   My doctor: Rosalio Lee, DO   My clinic: [facility]        My Control Medicine: Flovent  My Rescue Medicine: Albuterol (Proair/Ventolin/Proventil HFA) 2-4 puffs EVERY 4 HOURS as needed. Use a spacer if recommended by your provider.   My Asthma Severity:   Mild Persistent  Know your asthma triggers:   dust mites  pollens  animal dander  humidity            GREEN ZONE   Good Control    I feel good    No cough or wheeze    Can work, sleep and play without asthma symptoms       Take your asthma control medicine every day.     1. If exercise triggers your asthma, take your rescue medication    15 minutes before exercise or sports, and    During exercise if you have asthma symptoms  2. Spacer to use with inhaler: If you have a spacer, make sure to use it with your inhaler             YELLOW ZONE Getting Worse  I have ANY of these:    I do not feel good    Cough or wheeze    Chest feels tight    Wake up at night   1. Keep taking your Green Zone medications  2. Start taking your rescue medicine:    every 20 minutes for up to 1 hour. Then every 4 hours for 24-48 hours.  3. If you stay in the Yellow Zone for more than 12-24 hours, contact your doctor.  4. If you do not return to the Green Zone in 12-24 hours or you get worse, start taking your oral steroid medicine if prescribed by your provider.           RED ZONE Medical Alert - Get Help  I have ANY of these:    I feel awful    Medicine is not helping    Breathing getting harder    Trouble walking or talking    Nose opens wide to breathe       1. Take your rescue medicine NOW  2. If your provider has prescribed an oral steroid medicine, start taking it NOW  3. Call your doctor NOW  4. If you are still in the Red Zone after 20 minutes and you have not reached your doctor:    Take your rescue medicine again and    Call 911 or go to the emergency room  right away    See your regular doctor within 2 weeks of an Emergency Room or Urgent Care visit for follow-up treatment.          Annual Reminders:  Meet with Asthma Educator,  Flu Shot in the Fall, consider Pneumonia Vaccination for patients with asthma (aged 19 and older).    Pharmacy: [id number] [place] [id number] [facility] [address]    Electronically signed by Rosalio Lee, DO   Date: 08/31/21                      Asthma Triggers  How To Control Things That Make Your Asthma Worse    Triggers are things that make your asthma worse.  Look at the list below to help you find your triggers and what you can do about them.  You can help prevent asthma flare-ups by staying away from your triggers.      Trigger                                                          What you can do   Cigarette Smoke  Tobacco smoke can make asthma worse. Do not allow smoking in your home, car or around you.  Be sure no one smokes at a child s day care or school.  If you smoke, ask your health care provider for ways to help you quit.  Ask family members to quit too.  Ask your health care provider for a referral to Quit Plan to help you quit smoking, or call 8-489-237-PLAN.     Colds, Flu, Bronchitis  These are common triggers of asthma. Wash your hands often.  Don t touch your eyes, nose or mouth.  Get a flu shot every year.     Dust Mites  These are tiny bugs that live in cloth or carpet. They are too small to see. Wash sheets and blankets in hot water every week.   Encase pillows and mattress in dust mite proof covers.  Avoid having carpet if you can. If you have carpet, vacuum weekly.   Use a dust mask and HEPA vacuum.   Pollen and Outdoor Mold  Some people are allergic to trees, grass, or weed pollen, or molds. Try to keep your windows closed.  Limit time out doors when pollen count is high.   Ask you health care provider about taking medicine during allergy season.     Animal  Dander  Some people are allergic to skin flakes, urine or saliva from pets with fur or feathers. Keep pets with fur or feathers out of your home.    If you can t keep the pet outdoors, then keep the pet out of your bedroom.  Keep the bedroom door closed.  Keep pets off cloth furniture and away from stuffed toys.     Mice, Rats, and Cockroaches   Some people are allergic to the waste from these pests.   Cover food and garbage.  Clean up spills and food crumbs.  Store grease in the refrigerator.   Keep food out of the bedroom.   Indoor Mold  This can be a trigger if your home has high moisture. Fix leaking faucets, pipes, or other sources of water.   Clean moldy surfaces.  Dehumidify basement if it is damp and smelly.   Smoke, Strong Odors, and Sprays  These can reduce air quality. Stay away from strong odors and sprays, such as perfume, powder, hair spray, paints, smoke incense, paint, cleaning products, candles and new carpet.   Exercise or Sports  Some people with asthma have this trigger. Be active!  Ask your doctor about taking medicine before sports or exercise to prevent symptoms.    Warm up for 5-10 minutes before and after sports or exercise.     Other Triggers of Asthma  Cold air:  Cover your nose and mouth with a scarf.  Sometimes laughing or crying can be a trigger.  Some medicines and food can trigger asthma.

## 2021-09-01 ASSESSMENT — ANXIETY QUESTIONNAIRES: GAD7 TOTAL SCORE: 0

## 2021-09-01 ASSESSMENT — ASTHMA QUESTIONNAIRES: ACT_TOTALSCORE: 25

## 2021-09-07 ENCOUNTER — OFFICE VISIT (OUTPATIENT)
Dept: UROLOGY | Facility: OTHER | Age: 66
End: 2021-09-07
Attending: UROLOGY
Payer: COMMERCIAL

## 2021-09-07 VITALS
SYSTOLIC BLOOD PRESSURE: 134 MMHG | HEART RATE: 71 BPM | DIASTOLIC BLOOD PRESSURE: 72 MMHG | WEIGHT: 206.6 LBS | BODY MASS INDEX: 28.02 KG/M2 | RESPIRATION RATE: 16 BRPM | OXYGEN SATURATION: 98 %

## 2021-09-07 DIAGNOSIS — R97.20 ELEVATED PSA: Primary | ICD-10-CM

## 2021-09-07 PROCEDURE — 99213 OFFICE O/P EST LOW 20 MIN: CPT | Performed by: UROLOGY

## 2021-09-07 ASSESSMENT — PAIN SCALES - GENERAL: PAINLEVEL: MILD PAIN (3)

## 2021-09-07 NOTE — NURSING NOTE
Chief Complaint   Patient presents with     Follow Up     elevated PSA   Patient presents to the clinic for a follow up for an elevated PSA    Review of Systems:    Weight loss:    No     Recent fever/chills:  No   Night sweats:   No  Current skin rash:  No   Recent hair loss:  No  Heat intolerance:  No   Cold intolerance:  No  Chest pain:   No   Palpitations:   No  Shortness of breath:  No   Wheezing:   No  Constipation:    No   Diarrhea:   No   Nausea:   No   Vomiting:   No   Kidney/side pain:  No   Back pain:   Yes  Frequent headaches:  No   Dizziness:     No  Leg swelling:   No   Calf pain:    No          Medication Reconciliation: completed   Ludy Soares LPN  9/7/2021 2:35 PM

## 2021-09-07 NOTE — PROGRESS NOTES
Chief Complaint  Elevated PSA    HPI  Mr. Lopez is a 66 year old male who follows up after undergoing an annual PSA.  He does have a significant family history of prostate cancer: brother diagnosed at 62 and  from prostate cancer at 65, and another brother diagnosed at age 70.  The patient underwent a biopsy last October which was negative for prostate cancer.  Patient denies any new symptoms such as unintentional weight loss, bone or pelvic pain.      Review of Systems  I personally reviewed the ROS with the patient.    Nursing Notes:   Ludy Soares LPN  2021  2:40 PM  Addendum  Chief Complaint   Patient presents with     Follow Up     elevated PSA   Patient presents to the clinic for a follow up for an elevated PSA    Review of Systems:    Weight loss:    No     Recent fever/chills:  No   Night sweats:   No  Current skin rash:  No   Recent hair loss:  No  Heat intolerance:  No   Cold intolerance:  No  Chest pain:   No   Palpitations:   No  Shortness of breath:  No   Wheezing:   No  Constipation:    No   Diarrhea:   No   Nausea:   No   Vomiting:   No   Kidney/side pain:  No   Back pain:   Yes  Frequent headaches:  No   Dizziness:     No  Leg swelling:   No   Calf pain:    No          Medication Reconciliation: completed   Ludy Soares LPN  2021 2:35 PM       Physical Exam  Vitals:    21 1439   BP: 134/72   BP Location: Right arm   Patient Position: Sitting   Cuff Size: Adult Large   Pulse: 71   Resp: 16   SpO2: 98%   Weight: 93.7 kg (206 lb 9.6 oz)   Constitutional: NAD, WDWN.  Cardiovascular: Regular rate.  Pulmonary/Chest: Respirations are even and non-labored bilaterally.  Abdominal: Soft. No distension, tenderness, masses or guarding. No CVA tenderness.  Extremities: TAMRA x 4, Warm. No clubbing.  No cyanosis.    Skin: Pink, warm and dry.  No rashes noted.  Genitourinary: nonpalpable bladder    Labs  Results for ZAN LOPEZ (MRN [id number]) as of 2021 14:48   2021 14:33   PSA  6.44 (H)     Results for ZAN LOPEZ (MRN [id number]) as of 9/3/2020 12:10   8/21/2020 08:45   PSA 5.23 (H)     Results for ZAN LOPEZ (MRN [id number]) as of 9/11/2019 08:43   1/8/2015 11:05 9/1/2016 15:15 4/27/2018 15:41 8/20/2019 14:27   PSA 3.37 3.78 3.84 4.82 (H)     Imaging  MRI prostate  9/1/2020  IMPRESSION:  2 indeterminate lesions within the left prostate.  Assessment: PI-RADS 3  45g    Pathology  MRI guided biopsy  10/6/2020  12 core plus 2 lesions all negative  51g    ^^^^^^^^^^^^^^^^^^^^^^^^^^^^^    TRUS Biopsy  10/8/2019  12 cores negative  46 g    Assessment  Mr. Lopez is a 66 year old male who follows up with persistently elevated PSA with a very strong family history of prostate cancer who recently underwent an annual PSA.  The PSA continues to trend upward.  We did discuss PSA density and the patient's PSA density is still fairly low.  Of course the family history component contributes a significant amount of anxiety.    The patient has now undergone 2 prostate biopsies 1 year apart, 1 of which was MRI guided.  All samples have been negative.    Plan  Follow-up in 6 months with a PSA prior

## 2021-09-11 ENCOUNTER — TRANSFERRED RECORDS (OUTPATIENT)
Dept: HEALTH INFORMATION MANAGEMENT | Facility: OTHER | Age: 66
End: 2021-09-11

## 2021-09-12 ENCOUNTER — HOSPITAL ENCOUNTER (OUTPATIENT)
Facility: OTHER | Age: 66
Discharge: HOME | End: 2021-09-12
Attending: SURGERY | Admitting: SURGERY
Payer: COMMERCIAL

## 2021-09-12 VITALS
DIASTOLIC BLOOD PRESSURE: 90 MMHG | HEART RATE: 68 BPM | SYSTOLIC BLOOD PRESSURE: 163 MMHG | RESPIRATION RATE: 16 BRPM | TEMPERATURE: 97.2 F | OXYGEN SATURATION: 99 %

## 2021-09-12 DIAGNOSIS — T18.108A ESOPHAGEAL FOREIGN BODY, INITIAL ENCOUNTER: Primary | ICD-10-CM

## 2021-09-12 PROBLEM — T18.128A FOOD IMPACTION OF ESOPHAGUS: Status: ACTIVE | Noted: 2021-09-12
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
## 2021-09-12 PROCEDURE — 99203 OFFICE O/P NEW LOW 30 MIN: CPT | Performed by: SURGERY

## 2021-09-12 RX ORDER — OMEPRAZOLE 40 MG/1
40 CAPSULE, DELAYED RELEASE ORAL DAILY
Qty: 45 CAPSULE | Refills: 0 | Status: SHIPPED | OUTPATIENT
Start: 2021-09-12 | End: 2021-11-01

## 2021-09-12 RX ORDER — LIDOCAINE 40 MG/G
CREAM TOPICAL
Status: DISCONTINUED | OUTPATIENT
Start: 2021-09-12 | End: 2021-09-12 | Stop reason: HOSPADM

## 2021-09-12 NOTE — PROGRESS NOTES
Per MD Dr. Denny, patient has resolved food impaction and is able to be discharged home without further intervention. IV removed. Discharge instructions reviewed with patient and wife, both verbalize understanding. Discharged home via ambulation with wife at 0110.

## 2021-09-12 NOTE — DISCHARGE INSTRUCTIONS
Remain on clear liquid diet tonight. Start in the AM with softer foods. If you have difficulty swallowing, keeping food down or breathing return to medical center for further assistance or call 911.

## 2021-09-12 NOTE — PROGRESS NOTES
Patient arrived to [facility] with wife. Report received from JOSE Hinkle prior to patient arrival. Patient Removed all metal/jewelry and changed into a medical gown.

## 2021-09-12 NOTE — CONSULTS
Surgical Clinic Consult  Referring physician:  Daphne  Primary physician:     Rosalio Lee    Chief complaint:   Esophageal foreign body    History of present illness:  This is a 66 year old male I am seeing in consultation for an esophageal foreign body.  The patient was eating ribs/chicken/green beans and potatoes at the year-end golf banquet in [facility].  While eating some potatoes he noticed that he suddenly could not swallow and was spitting up saliva.  Arrangements were made for him to have an emergency upper endoscopy with foreign body extraction as he failed glucagon in the [place] emergency room.  As he was leaving the [place] emergency room he felt suddenly improved and was able to swallow the entire ride down without spinning.  He is able to drink liquids.    Past medical history:   Past Medical History:   Diagnosis Date     Mild intermittent asthma without complication 6/26/2013    Asthma action plan:  6/26/13      Mild persistent asthma 6/26/2013       Pastsurgical history:  Past Surgical History:   Procedure Laterality Date     BIOPSY PROSTATE TRANSRECTAL N/A 10/8/2019    Procedure: Transrectal Ultrasound Guided Biopsy of Prostate;  Surgeon: Juliano Williamson MD;  Location:  OR     CIRCUMCISION       COLONOSCOPY  2007    normal     COLONOSCOPY  06/2018    2 small polyps recheck 10 years per patient     EXCISE LESION FACE WITH FLAP PEDICLE Left 8/22/2018    Procedure: EXCISE LESION FACE WITH FLAP PEDICLE;  EXCISION LEFT NASAL BASAL CELL CARCINOMA WITH FROZENS AND FLAP REPAIR  1 x 0.6  3;  Surgeon: Yaneli Reeves MD;  Location: [facility] OR     MRI BIOPSY PROSTATE N/A 10/6/2020    Procedure: MRI guided prostate biopsy;  Surgeon: Juliano Williamson MD;  Location:  OR     ORTHOPEDIC SURGERY  12/2013    left knee meniscus repair       Current medications:  Current Outpatient Medications   Medication Sig Dispense Refill     albuterol (PROAIR HFA/PROVENTIL HFA/VENTOLIN HFA) 108 (90 Base) MCG/ACT  inhaler Inhale 2 puffs into the lungs every 6 hours 1 Inhaler 5     FLOVENT  MCG/ACT inhaler INHALE 1 PUFF INTO THE LUNGS TWICE DAILY 12 g 11       Allergies:  Allergies   Allergen Reactions     Shellfish-Derived Products Rash     Also gets flushed face     Seasonal Allergies        Family history:  Family History   Problem Relation Age of Onset     Cancer Mother 54        lung     Heart Disease Father 75        MI     Other Cancer Brother 65        PASSED FROM PROSTATE CANCER     Genitourinary Problems Brother         enlarged prostate     Prostate Cancer Brother        Social history:  Social History     Socioeconomic History     Marital status:      Spouse name: Not on file     Number of children: Not on file     Years of education: Not on file     Highest education level: Not on file   Occupational History     Employer: [employer]     Occupation: [employer]      Comment: full time   Tobacco Use     Smoking status: Never Smoker     Smokeless tobacco: Never Used   Substance and Sexual Activity     Alcohol use: Yes     Comment: weekends, beer     Drug use: No     Sexual activity: Yes     Partners: Female     Birth control/protection: None   Other Topics Concern     Parent/sibling w/ CABG, MI or angioplasty before 65F 55M? No      Service Not Asked     Blood Transfusions Yes     Caffeine Concern Yes     Comment: coffee soda, 4 cups daily     Occupational Exposure Not Asked     Hobby Hazards Not Asked     Sleep Concern Not Asked     Stress Concern Not Asked     Weight Concern Not Asked     Special Diet Not Asked     Back Care Not Asked     Exercise Yes     Comment: walking, hiking     Bike Helmet Not Asked     Seat Belt Not Asked     Self-Exams Not Asked   Social History Narrative     Not on file     Social Determinants of Health     Financial Resource Strain:      Difficulty of Paying Living Expenses:    Food Insecurity:      Worried About Running Out of  Food in the Last Year:      Ran Out of Food in the Last Year:    Transportation Needs:      Lack of Transportation (Medical):      Lack of Transportation (Non-Medical):    Physical Activity:      Days of Exercise per Week:      Minutes of Exercise per Session:    Stress:      Feeling of Stress :    Social Connections:      Frequency of Communication with Friends and Family:      Frequency of Social Gatherings with Friends and Family:      Attends Religion Services:      Active Member of Clubs or Organizations:      Attends Club or Organization Meetings:      Marital Status:    Intimate Partner Violence:      Fear of Current or Ex-Partner:      Emotionally Abused:      Physically Abused:      Sexually Abused:        PROBLEM LIST:  Patient Active Problem List   Diagnosis     Mild intermittent asthma without complication     ACP (advance care planning)     Elevated PSA     Esophageal foreign body, initial encounter           Physical exam: BP (!) 163/90 (BP Location: Left arm)   Pulse 68   Temp 97.2  F (36.2  C) (Tympanic)   Resp 16   SpO2 99%       General: this is a pleasant male patient in no acute distress.  Patient is awake alert and oriented x3 .   He is able to drink water.    Assessment:   Esophageal foreign body now resolved    Plan:    1.  PPI for 6 weeks  2.  Schedule elective EGD in 4 to 6 weeks.       Baldomero Denny MD FACS
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
## 2021-09-13 DIAGNOSIS — T18.128A ESOPHAGEAL OBSTRUCTION DUE TO FOOD IMPACTION: Primary | ICD-10-CM

## 2021-10-24 DIAGNOSIS — T18.108A ESOPHAGEAL FOREIGN BODY, INITIAL ENCOUNTER: ICD-10-CM

## 2021-11-01 RX ORDER — OMEPRAZOLE 40 MG/1
CAPSULE, DELAYED RELEASE ORAL
Qty: 45 CAPSULE | Refills: 0 | Status: SHIPPED | OUTPATIENT
Start: 2021-11-01 | End: 2022-04-29

## 2021-12-14 DIAGNOSIS — R97.20 ELEVATED PSA: Primary | ICD-10-CM

## 2021-12-14 NOTE — PROGRESS NOTES
"Per last office visit  9/7/21 with Juliano Williamson MD \"Plan  Follow-up in 6 months with a PSA prior\"        Orders Placed    "

## 2022-03-09 ENCOUNTER — OFFICE VISIT (OUTPATIENT)
Dept: UROLOGY | Facility: OTHER | Age: 67
End: 2022-03-09
Attending: UROLOGY
Payer: COMMERCIAL

## 2022-03-09 ENCOUNTER — TELEPHONE (OUTPATIENT)
Dept: UROLOGY | Facility: OTHER | Age: 67
End: 2022-03-09
Payer: COMMERCIAL

## 2022-03-09 ENCOUNTER — LAB (OUTPATIENT)
Dept: LAB | Facility: OTHER | Age: 67
End: 2022-03-09
Attending: UROLOGY
Payer: COMMERCIAL

## 2022-03-09 VITALS
BODY MASS INDEX: 27.18 KG/M2 | RESPIRATION RATE: 16 BRPM | TEMPERATURE: 97.4 F | DIASTOLIC BLOOD PRESSURE: 62 MMHG | WEIGHT: 200.4 LBS | OXYGEN SATURATION: 96 % | HEART RATE: 93 BPM | SYSTOLIC BLOOD PRESSURE: 128 MMHG

## 2022-03-09 DIAGNOSIS — R97.20 ELEVATED PSA: ICD-10-CM

## 2022-03-09 DIAGNOSIS — R97.20 ELEVATED PSA: Primary | ICD-10-CM

## 2022-03-09 LAB — PSA SERPL-MCNC: 6.42 UG/L (ref 0–4)

## 2022-03-09 PROCEDURE — 36415 COLL VENOUS BLD VENIPUNCTURE: CPT

## 2022-03-09 PROCEDURE — 99213 OFFICE O/P EST LOW 20 MIN: CPT | Performed by: UROLOGY

## 2022-03-09 PROCEDURE — 84153 ASSAY OF PSA TOTAL: CPT

## 2022-03-09 ASSESSMENT — PAIN SCALES - GENERAL: PAINLEVEL: NO PAIN (0)

## 2022-03-09 NOTE — NURSING NOTE
Chief Complaint   Patient presents with     Follow Up     6 month elevated PSA      Patient presents to the clinic today for a 6 month follow up for elevated PSA     Review of Systems:    Weight loss:    No     Recent fever/chills:  No   Night sweats:   No  Current skin rash:  No   Recent hair loss:  No  Heat intolerance:  No   Cold intolerance:  No  Chest pain:   No   Palpitations:   No  Shortness of breath:  No   Wheezing:   No  Constipation:    No   Diarrhea:   No   Nausea:   No   Vomiting:   No   Kidney/side pain:  No   Back pain:   No  Frequent headaches:  No   Dizziness:     No  Leg swelling:   No   Calf pain:    No      Medication Reconciliation: completed   Ludy Soares LPN  3/9/2022 10:21 AM

## 2022-03-09 NOTE — PROGRESS NOTES
Chief Complaint  Elevated PSA    HPI  Mr. Corona is a 66 year old male w/h/o negative TRUS ( and ) and a strong family history of prostate cancer who follows up with a PSA.  Significant family history of prostate cancer: brother diagnosed at 62 and  from prostate cancer at 65, and another brother diagnosed at age 70.    In  the patient underwent ultrasound-guided prostate biopsy which was negative.  In  the patient underwent both a dedicated prostate MRI as well as MRI guided prostate biopsy which was also negative.  We have elected to proceed with PSA checks every 6 months given his significant family risk.  He denies any health changes since last visit.  He underwent a PSA earlier today and follows up to review the results.      Review of Systems  I personally reviewed the ROS with the patient.    Nursing Notes:   Ludy Soares LPN  3/9/2022 10:22 AM  Signed  Chief Complaint   Patient presents with     Follow Up     6 month elevated PSA      Patient presents to the clinic today for a 6 month follow up for elevated PSA     Review of Systems:    Weight loss:    No     Recent fever/chills:  No   Night sweats:   No  Current skin rash:  No   Recent hair loss:  No  Heat intolerance:  No   Cold intolerance:  No  Chest pain:   No   Palpitations:   No  Shortness of breath:  No   Wheezing:   No  Constipation:    No   Diarrhea:   No   Nausea:   No   Vomiting:   No   Kidney/side pain:  No   Back pain:   No  Frequent headaches:  No   Dizziness:     No  Leg swelling:   No   Calf pain:    No      Medication Reconciliation: completed   Ludy Soares LPN  3/9/2022 10:21 AM       Physical Exam  Vitals:    22 1025   BP: 128/62   BP Location: Right arm   Patient Position: Sitting   Cuff Size: Adult Large   Pulse: 93   Resp: 16   Temp: 97.4  F (36.3  C)   TempSrc: Tympanic   SpO2: 96%   Weight: 90.9 kg (200 lb 6.4 oz)   Constitutional: NAD, WDWN.  Cardiovascular: Regular rate.  Pulmonary/Chest: Respirations  are even and non-labored bilaterally.  Abdominal: Soft. No distension, tenderness, masses or guarding. No CVA tenderness.  Extremities: TAMRA x 4, Warm. No clubbing.  No cyanosis.    Skin: Pink, warm and dry.  No rashes noted.  Genitourinary: nonpalpable bladder    Labs  Results for ZAN LOPEZ (MRN [id number]) as of 3/9/2022 12:34   1/8/2015 11:05 9/1/2016 15:15 4/27/2018 15:41 8/20/2019 14:27 8/21/2020 08:45 8/31/2021 14:33 3/9/2022 08:32   PSA 3.37 3.78 3.84 4.82 (H) 5.23 (H) 6.44 (H) 6.42 (H)     Imaging  MRI prostate  9/1/2020  IMPRESSION:  2 indeterminate lesions within the left prostate.  Assessment: PI-RADS 3  45g    Pathology  MRI guided biopsy  10/6/2020  12 core plus 2 lesions all negative  51g    ^^^^^^^^^^^^^^^^^^^^^^^^^^^^^    TRUS Biopsy  10/8/2019  12 cores negative  46 g    Assessment  Mr. Lopez is a 66 year old male w/h/o negative TRUS (2019 and 2020) and a strong family history of prostate cancer who follows up with a PSA.    The patient's PSA is stable.  I have recommended we continue checking it every 6 months until we confidently establish that it is stable.  He sees his PCP once annually every fall.  He gets labs annually at that time.  I recommended he see me every spring with a PSA prior.  Therefore he can save one visit    Plan  Continue PSA checks every 6 months.   - Acquire PSA with annual labs every fall through PCP.    - If there are any concerns about the value at that time he can follow-up with me as needed  Follow-up with me once annually every spring with a PSA prior.

## 2022-03-09 NOTE — TELEPHONE ENCOUNTER
Left message returning call stated that I would send Mychart message to him.     Ludy Soares LPN on 3/9/2022 at 2:24 PM

## 2022-04-28 ENCOUNTER — NURSE TRIAGE (OUTPATIENT)
Dept: INTERNAL MEDICINE | Facility: OTHER | Age: 67
End: 2022-04-28
Payer: COMMERCIAL

## 2022-04-28 NOTE — TELEPHONE ENCOUNTER
Call placed to patient per Dr. Lee request, as patient has tested positive for covid 19 on 4/27/22.    Symptoms to include: cough and headache starting on 4/25/22. Denies shortness of breath. Patient has a hx of asthma.     Fully vaccinated for Covid 19 and received booster x 1.     Patient inquiring on oral antiviral treatment. Patient has been provided with contact # [phone] to reach out to discuss oral anti-viral treatment.

## 2022-04-29 ENCOUNTER — VIRTUAL VISIT (OUTPATIENT)
Dept: FAMILY MEDICINE | Facility: CLINIC | Age: 67
End: 2022-04-29
Payer: COMMERCIAL

## 2022-04-29 DIAGNOSIS — U07.1 CLINICAL DIAGNOSIS OF COVID-19: Primary | ICD-10-CM

## 2022-04-29 PROCEDURE — 99213 OFFICE O/P EST LOW 20 MIN: CPT | Mod: 95 | Performed by: PHYSICIAN ASSISTANT

## 2022-04-29 NOTE — PATIENT INSTRUCTIONS
"  Discharge Instructions for COVID-19 Patients  You have--or may have--COVID-19. Please follow the instructions listed below.   If you have a weakened immune system, discuss with your doctor any other actions you need to take.  How can I protect others?  If you have symptoms (fever, cough, body aches or trouble breathing):    Stay home and away from others (self-isolate) until:  ? Your other symptoms have resolved (gotten better). And   ? You've had no fever--and no medicine that reduces fever--for 1 full day (24 hours). And   ? At least 10 days have passed since your symptoms started. (You may need to wait 20 days. Follow the advice of your care team.)  If you don't show symptoms, but testing showed that you have COVID-19:    Stay home and away from others (self-isolate) until at least 10 days have passed since the date of your first positive COVID-19 test.  During this time    Stay in your own room, even for meals. Use your own bathroom if you can.    Stay away from others in your home. No hugging, kissing or shaking hands. No visitors.    Don't go to work, school or anywhere else.    Clean \"high touch\" surfaces often (doorknobs, counters, handles). Use household cleaning spray or wipes.    You'll find a full list of  on the EPA website: www.epa.gov/pesticide-registration/list-n-disinfectants-use-against-sars-cov-2.    Cover your mouth and nose with a mask or other face covering to avoid spreading germs.    Wash your hands and face often. Use soap and water.    Caregivers in these groups are at risk for severe illness due to COVID-19:  ? People 65 years and older  ? People who live in a nursing home or long-term care facility  ? People with chronic disease (lung, heart, cancer, diabetes, kidney, liver, immunologic)  ? People who have a weakened immune system, including those who:    Are in cancer treatment    Take medicine that weakens the immune system, such as corticosteroids    Had a bone marrow or " organ transplant    Have an immune deficiency    Have poorly controlled HIV or AIDS    Are obese (body mass index of 40 or higher)    Smoke regularly    Caregivers should wear gloves while washing dishes, handling laundry and cleaning bedrooms and bathrooms.    Use caution when washing and drying laundry: Don't shake dirty laundry and use the warmest water setting that you can.    For more tips on managing your health at home, go to www.cdc.gov/coronavirus/2019-ncov/downloads/10Things.pdf.  How can I take care of myself at home?  1. Get lots of rest. Drink extra fluids (unless a doctor has told you not to).  2. Take Tylenol (acetaminophen) for fever or pain. If you have liver or kidney problems, ask your family doctor if it's okay to take Tylenol.   Adults can take either:   ? 650 mg (two 325 mg pills) every 4 to 6 hours, or   ? 1,000 mg (two 500 mg pills) every 8 hours as needed.  ? Note: Don't take more than 3,000 mg in one day. Acetaminophen is found in many medicines (both prescribed and over-the-counter medicines). Read all labels to be sure you don't take too much.   For children, check the Tylenol bottle for the right dose. The dose is based on the child's age or weight.  3. If you have other health problems (like cancer, heart failure, an organ transplant or severe kidney disease): Call your specialty clinic if you don't feel better in the next 2 days.  4. Know when to call 911. Emergency warning signs include:  ? Trouble breathing or shortness of breath  ? Pain or pressure in the chest that doesn't go away  ? Feeling confused like you haven't felt before, or not being able to wake up  ? Bluish-colored lips or face  5. Your doctor may have prescribed a blood thinner medicine. Follow their instructions.  Where can I get more information?     [facility] - About COVID-19:   [email or web address]    CDC - What to Do If You're Sick:  www.cdc.gov/coronavirus/2019-ncov/about/steps-when-sick.html    CDC - Ending Home Isolation: www.cdc.gov/coronavirus/2019-ncov/hcp/disposition-in-home-patients.html    CDC - Caring for Someone: www.cdc.gov/coronavirus/2019-ncov/if-you-are-sick/care-for-someone.html    [place] - Interim Guidance for Hospital Discharge to Home: www.health.[facility].[place]./diseases/coronavirus/hcp/hospdischarge.pdf    Below are the COVID-19 hotlines at the [place] Department of Health ([place]). Interpreters are available.  ? For health questions: Call 485-989-3473 or 1-823.782.8089 (7 a.m. to 7 p.m.)  ? For questions about schools and childcare: Call 789-528-5383 or 1-678.677.6216 (7 a.m. to 7 p.m.)    For informational purposes only. Not to replace the advice of your health care provider. Clinically reviewed by Dr. Anup Osie.   Copyright   2020 [facility]. All rights reserved. [employer] 965511 - REV 01/05/21.

## 2022-04-29 NOTE — PROGRESS NOTES
Joes C is a 66 year old who is being evaluated via a billable video visit.        Video Start Time: 8:28 AM    Assessment & Plan     Clinical diagnosis of COVID-19    Treat as below.    - nirmatrelvir and ritonavir (PAXLOVID) therapy pack; Take 3 tablets by mouth 2 times daily for 5 days Take 2 Nirmatrelvir tablets and 1 Ritonavir tablet twice daily for 5 days.              COVID-19 positive patient.  Encounter for consideration of medication intervention. Patient does qualify for a prescription. Full discussion with patient including medication options, risks and benefits. Potential drug interactions reviewed with patient.     Treatment Planned Paxlovid, normal dosing- sent to [facility]    Temporary change to home medications:  None     Estimated body mass index is 27.18 kg/m  as calculated from the following:    Height as of 8/31/21: 1.829 m (6').    Weight as of 3/9/22: 90.9 kg (200 lb 6.4 oz).  GFR Estimate   Date Value Ref Range Status   08/31/2021 >90 >60 mL/min/1.73m2 Final     Comment:     As of July 11, 2021, eGFR is calculated by the CKD-EPI creatinine equation, without race adjustment. eGFR can be influenced by muscle mass, exercise, and diet. The reported eGFR is an estimation only and is only applicable if the renal function is stable.   08/21/2020 89 >60 mL/min/[1.73_m2] Final     Comment:     Starting 12/18/2018, serum creatinine based estimated GFR (eGFR) will be   calculated using the Chronic Kidney Disease Epidemiology Collaboration   (CKD-EPI) equation.       Lab Results   Component Value Date    [id number] Not Detected 10/03/2020       No follow-ups on file.    [place] PA-C  [facility]    Milka Woodall is a 66 year old who presents for the following health issues     HPI       COVID-19 Symptom Review  How many days ago did these symptoms start? 4/25/22    Are any of the following symptoms significant for you?    New or worsening difficulty breathing?  No    Worsening cough? Yes, it's a dry cough.     Fever or chills? No    Headache: YES    Sore throat: no    Chest pain: no    Diarrhea: no    Body aches? no    What treatments has patient tried? Nonsteroidals and inhalers   Does patient live in a nursing home, group home, or shelter? no  Does patient have a way to get food/medications during quarantined? Yes, I have a friend or family member who can help me.            Review of Systems   Constitutional, HEENT, cardiovascular, pulmonary, gi and gu systems are negative, except as otherwise noted.      Objective           Vitals:  No vitals were obtained today due to virtual visit.    Physical Exam   GENERAL: Healthy, alert and no distress  EYES: Eyes grossly normal to inspection.  No discharge or erythema, or obvious scleral/conjunctival abnormalities.  HENT: Normal cephalic/atraumatic.  External ears, nose and mouth without ulcers or lesions.  No nasal drainage visible.  NECK: No asymmetry, visible masses or scars  RESP: No audible wheeze, cough, or visible cyanosis.  No visible retractions or increased work of breathing.    SKIN: Visible skin clear. No significant rash, abnormal pigmentation or lesions.  NEURO: Cranial nerves grossly intact.  Mentation and speech appropriate for age.  PSYCH: Mentation appears normal, affect normal/bright, judgement and insight intact, normal speech and appearance well-groomed.                Video-Visit Details    Type of service:  Video Visit    Video End Time:8:37 AM    Originating Location (pt. Location): Home    Distant Location (provider location):  [facility] [place]     Platform used for Video Visit: [employer]

## 2022-06-27 DIAGNOSIS — J45.30 MILD PERSISTENT ASTHMA WITHOUT COMPLICATION: ICD-10-CM

## 2022-06-28 RX ORDER — FLUTICASONE PROPIONATE 110 UG/1
AEROSOL, METERED RESPIRATORY (INHALATION)
Qty: 12 G | Refills: 11 | Status: SHIPPED | OUTPATIENT
Start: 2022-06-28 | End: 2023-08-16

## 2022-06-28 NOTE — TELEPHONE ENCOUNTER
flovent HFA     Last Written Prescription Date:  1/28/2019  Last Fill Quantity: 1,   # refills: 5  Last Office Visit: 8/31/21  Future Office visit:       Routing refill request to provider for review/approval because:

## 2022-07-05 ENCOUNTER — OFFICE VISIT (OUTPATIENT)
Dept: FAMILY MEDICINE | Facility: OTHER | Age: 67
End: 2022-07-05
Attending: NURSE PRACTITIONER
Payer: COMMERCIAL

## 2022-07-05 VITALS
OXYGEN SATURATION: 97 % | SYSTOLIC BLOOD PRESSURE: 136 MMHG | TEMPERATURE: 97.3 F | DIASTOLIC BLOOD PRESSURE: 82 MMHG | HEART RATE: 58 BPM

## 2022-07-05 DIAGNOSIS — L02.91 ABSCESS: Primary | ICD-10-CM

## 2022-07-05 PROCEDURE — 99203 OFFICE O/P NEW LOW 30 MIN: CPT | Performed by: NURSE PRACTITIONER

## 2022-07-05 PROCEDURE — 87070 CULTURE OTHR SPECIMN AEROBIC: CPT | Performed by: NURSE PRACTITIONER

## 2022-07-05 RX ORDER — CEPHALEXIN 500 MG/1
500 CAPSULE ORAL 4 TIMES DAILY
Qty: 28 CAPSULE | Refills: 0 | Status: SHIPPED | OUTPATIENT
Start: 2022-07-05 | End: 2022-07-12

## 2022-07-05 ASSESSMENT — PAIN SCALES - GENERAL: PAINLEVEL: NO PAIN (0)

## 2022-07-05 NOTE — PATIENT INSTRUCTIONS
Keep clean and dry. May use gauze or regular bandage.   Follow up if not improving in 2-3 days with current antibiotics  Culture pending.

## 2022-07-05 NOTE — NURSING NOTE
Chief Complaint   Patient presents with     Derm Problem       Initial /82 (BP Location: Right arm, Patient Position: Chair, Cuff Size: Adult Regular)   Pulse 58   Temp 97.3  F (36.3  C) (Tympanic)   SpO2 97%  Estimated body mass index is 27.18 kg/m  as calculated from the following:    Height as of 8/31/21: 1.829 m (6').    Weight as of 3/9/22: 90.9 kg (200 lb 6.4 oz).  Medication Reconciliation: complete  Samia Bustillo

## 2022-07-05 NOTE — PROGRESS NOTES
Assessment & Plan     Abscess  Keep clean and dry. May use gauze or regular bandage.   Follow up if not improving in 2-3 days with current antibiotics  Culture pending.  - cephALEXin (KEFLEX) 500 MG capsule; Take 1 capsule (500 mg) by mouth 4 times daily for 7 days  - Abscess Aerobic Bacterial Culture Routine; Future  - Abscess Aerobic Bacterial Culture Routine    Ordering of each unique test  Prescription drug management     BMI:   Estimated body mass index is 27.18 kg/m  as calculated from the following:    Height as of 8/31/21: 1.829 m (6').    Weight as of 3/9/22: 90.9 kg (200 lb 6.4 oz).   Weight management plan: Discussed healthy diet and exercise guidelines      Return if symptoms worsen or fail to improve.    Danitza Leslie, CNP  [facility] BERNARDO Woodall is a 67 year old, presenting for the following health issues:  Derm Problem      HPI     Concern - Infected Boil/skin tag  Onset: 2-3 days ago  Description: Had a skin tag, Saturday it was inflamed and painful, swelling. Pus this morning.  Intensity: as long as it's not rubbing on anything it's ok  Progression of Symptoms:  worsening  Accompanying Signs & Symptoms: painful, red, warm to touch, yellow pus  Previous history of similar problem: no  Precipitating factors:        Worsened by: rubbing and movement  Alleviating factors:        Improved by: feels some relief once the pus started coming out  Therapies tried and outcome: antibiotic oinment      Review of Systems   Constitutional, HEENT, cardiovascular, pulmonary, gi and gu systems are negative, except as otherwise noted.      Objective    /82 (BP Location: Right arm, Patient Position: Chair, Cuff Size: Adult Regular)   Pulse 58   Temp 97.3  F (36.3  C) (Tympanic)   SpO2 97%   There is no height or weight on file to calculate BMI.  Physical Exam   GENERAL: healthy, alert and no distress  SKIN: abscess to left chest about 5 cm from axilla. Total diameter  approx 4 cm. This area has opened up on it's own and is draining non-odorous (evaluated through a mas due to the  COVID pandemic) and purulent, white drainage. Drainage is non-bloody. Culture obtained    Results for orders placed or performed in visit on 07/05/22   Abscess Aerobic Bacterial Culture Routine     Status: None (Preliminary result)    Specimen: Chest; Abscess   Result Value Ref Range    Culture No growth after 2 days                  .  ..

## 2022-07-09 ENCOUNTER — HEALTH MAINTENANCE LETTER (OUTPATIENT)
Age: 67
End: 2022-07-09

## 2022-07-12 LAB — BACTERIA ABSC ANAEROBE+AEROBE CULT: ABNORMAL

## 2022-09-04 ENCOUNTER — HEALTH MAINTENANCE LETTER (OUTPATIENT)
Age: 67
End: 2022-09-04

## 2022-11-16 ASSESSMENT — ENCOUNTER SYMPTOMS
PALPITATIONS: 0
EYE PAIN: 0
FEVER: 0
JOINT SWELLING: 0
ARTHRALGIAS: 0
HEARTBURN: 0
CHILLS: 0
HEADACHES: 0
DYSURIA: 0
CONSTIPATION: 0
HEMATOCHEZIA: 0
DIZZINESS: 0
SHORTNESS OF BREATH: 0
COUGH: 0
FREQUENCY: 1
NAUSEA: 0
PARESTHESIAS: 0
WEAKNESS: 0
SORE THROAT: 0
NERVOUS/ANXIOUS: 0
ABDOMINAL PAIN: 0
DIARRHEA: 0
HEMATURIA: 0
MYALGIAS: 0

## 2022-11-16 ASSESSMENT — ACTIVITIES OF DAILY LIVING (ADL): CURRENT_FUNCTION: NO ASSISTANCE NEEDED

## 2022-11-23 ENCOUNTER — TELEPHONE (OUTPATIENT)
Dept: INTERNAL MEDICINE | Facility: OTHER | Age: 67
End: 2022-11-23

## 2022-11-23 ENCOUNTER — OFFICE VISIT (OUTPATIENT)
Dept: INTERNAL MEDICINE | Facility: OTHER | Age: 67
End: 2022-11-23
Attending: FAMILY MEDICINE
Payer: COMMERCIAL

## 2022-11-23 VITALS
OXYGEN SATURATION: 98 % | TEMPERATURE: 97.5 F | HEART RATE: 54 BPM | SYSTOLIC BLOOD PRESSURE: 132 MMHG | WEIGHT: 199 LBS | RESPIRATION RATE: 20 BRPM | DIASTOLIC BLOOD PRESSURE: 84 MMHG | BODY MASS INDEX: 26.99 KG/M2

## 2022-11-23 DIAGNOSIS — Z23 NEED FOR VACCINATION FOR STREP PNEUMONIAE: Primary | ICD-10-CM

## 2022-11-23 DIAGNOSIS — Z13.6 ENCOUNTER FOR ABDOMINAL AORTIC ANEURYSM (AAA) SCREENING: ICD-10-CM

## 2022-11-23 DIAGNOSIS — Z00.00 ROUTINE HISTORY AND PHYSICAL EXAMINATION OF ADULT: ICD-10-CM

## 2022-11-23 DIAGNOSIS — R97.20 ELEVATED PROSTATE SPECIFIC ANTIGEN (PSA): ICD-10-CM

## 2022-11-23 LAB
ALBUMIN SERPL BCG-MCNC: 4.2 G/DL (ref 3.5–5.2)
ALP SERPL-CCNC: 89 U/L (ref 40–129)
ALT SERPL W P-5'-P-CCNC: 28 U/L (ref 10–50)
ANION GAP SERPL CALCULATED.3IONS-SCNC: 11 MMOL/L (ref 7–15)
AST SERPL W P-5'-P-CCNC: 24 U/L (ref 10–50)
BASOPHILS # BLD AUTO: 0 10E3/UL (ref 0–0.2)
BASOPHILS NFR BLD AUTO: 1 %
BILIRUB SERPL-MCNC: 0.5 MG/DL
BUN SERPL-MCNC: 14.7 MG/DL (ref 8–23)
CALCIUM SERPL-MCNC: 9.2 MG/DL (ref 8.8–10.2)
CHLORIDE SERPL-SCNC: 104 MMOL/L (ref 98–107)
CHOLEST SERPL-MCNC: 197 MG/DL
CREAT SERPL-MCNC: 0.92 MG/DL (ref 0.67–1.17)
DEPRECATED HCO3 PLAS-SCNC: 23 MMOL/L (ref 22–29)
EOSINOPHIL # BLD AUTO: 0.2 10E3/UL (ref 0–0.7)
EOSINOPHIL NFR BLD AUTO: 4 %
ERYTHROCYTE [DISTWIDTH] IN BLOOD BY AUTOMATED COUNT: 12.9 % (ref 10–15)
GFR SERPL CREATININE-BSD FRML MDRD: >90 ML/MIN/1.73M2
GLUCOSE SERPL-MCNC: 89 MG/DL (ref 70–99)
HCT VFR BLD AUTO: 42.8 % (ref 40–53)
HDLC SERPL-MCNC: 93 MG/DL
HGB BLD-MCNC: 14.5 G/DL (ref 13.3–17.7)
LDLC SERPL CALC-MCNC: 92 MG/DL
LYMPHOCYTES # BLD AUTO: 1.8 10E3/UL (ref 0.8–5.3)
LYMPHOCYTES NFR BLD AUTO: 32 %
MCH RBC QN AUTO: 32.1 PG (ref 26.5–33)
MCHC RBC AUTO-ENTMCNC: 33.9 G/DL (ref 31.5–36.5)
MCV RBC AUTO: 95 FL (ref 78–100)
MONOCYTES # BLD AUTO: 0.6 10E3/UL (ref 0–1.3)
MONOCYTES NFR BLD AUTO: 10 %
NEUTROPHILS # BLD AUTO: 2.9 10E3/UL (ref 1.6–8.3)
NEUTROPHILS NFR BLD AUTO: 53 %
NONHDLC SERPL-MCNC: 104 MG/DL
PLATELET # BLD AUTO: 287 10E3/UL (ref 150–450)
POTASSIUM SERPL-SCNC: 4.6 MMOL/L (ref 3.4–5.3)
PROT SERPL-MCNC: 7.2 G/DL (ref 6.4–8.3)
PSA SERPL-MCNC: 4.65 NG/ML (ref 0–4.5)
RBC # BLD AUTO: 4.52 10E6/UL (ref 4.4–5.9)
SODIUM SERPL-SCNC: 138 MMOL/L (ref 136–145)
TRIGL SERPL-MCNC: 60 MG/DL
WBC # BLD AUTO: 5.4 10E3/UL (ref 4–11)

## 2022-11-23 PROCEDURE — 90677 PCV20 VACCINE IM: CPT | Performed by: INTERNAL MEDICINE

## 2022-11-23 PROCEDURE — 90471 IMMUNIZATION ADMIN: CPT | Performed by: INTERNAL MEDICINE

## 2022-11-23 PROCEDURE — 80061 LIPID PANEL: CPT | Performed by: INTERNAL MEDICINE

## 2022-11-23 PROCEDURE — 84153 ASSAY OF PSA TOTAL: CPT | Performed by: INTERNAL MEDICINE

## 2022-11-23 PROCEDURE — 99397 PER PM REEVAL EST PAT 65+ YR: CPT | Mod: 25 | Performed by: INTERNAL MEDICINE

## 2022-11-23 PROCEDURE — 80053 COMPREHEN METABOLIC PANEL: CPT | Performed by: INTERNAL MEDICINE

## 2022-11-23 PROCEDURE — 36415 COLL VENOUS BLD VENIPUNCTURE: CPT | Performed by: INTERNAL MEDICINE

## 2022-11-23 PROCEDURE — 85025 COMPLETE CBC W/AUTO DIFF WBC: CPT | Performed by: INTERNAL MEDICINE

## 2022-11-23 ASSESSMENT — ASTHMA QUESTIONNAIRES
QUESTION_4 LAST FOUR WEEKS HOW OFTEN HAVE YOU USED YOUR RESCUE INHALER OR NEBULIZER MEDICATION (SUCH AS ALBUTEROL): NOT AT ALL
QUESTION_3 LAST FOUR WEEKS HOW OFTEN DID YOUR ASTHMA SYMPTOMS (WHEEZING, COUGHING, SHORTNESS OF BREATH, CHEST TIGHTNESS OR PAIN) WAKE YOU UP AT NIGHT OR EARLIER THAN USUAL IN THE MORNING: NOT AT ALL
QUESTION_1 LAST FOUR WEEKS HOW MUCH OF THE TIME DID YOUR ASTHMA KEEP YOU FROM GETTING AS MUCH DONE AT WORK, SCHOOL OR AT HOME: NONE OF THE TIME
ACT_TOTALSCORE: 25
QUESTION_5 LAST FOUR WEEKS HOW WOULD YOU RATE YOUR ASTHMA CONTROL: COMPLETELY CONTROLLED
ACT_TOTALSCORE: 25
QUESTION_2 LAST FOUR WEEKS HOW OFTEN HAVE YOU HAD SHORTNESS OF BREATH: NOT AT ALL
ACUTE_EXACERBATION_TODAY: NO

## 2022-11-23 ASSESSMENT — PAIN SCALES - GENERAL: PAINLEVEL: NO PAIN (0)

## 2022-11-23 ASSESSMENT — ACTIVITIES OF DAILY LIVING (ADL): CURRENT_FUNCTION: NO ASSISTANCE NEEDED

## 2022-11-23 NOTE — TELEPHONE ENCOUNTER
Stacia please notify patient.  I believe he said he would be getting this after the first of the year.

## 2022-11-23 NOTE — Clinical Note
Corbin- This patient was unclear if he can wait 10 years or needs a colonoscopy sooner.  He states he was initially told 10 years, but when path was called to him he was unclear?

## 2022-11-23 NOTE — PROGRESS NOTES
"SUBJECTIVE:   CC: Jose C is an 67 year old who presents for preventative health visit.   Patient has been advised of split billing requirements and indicates understanding: Yes  Healthy Habits:     In general, how would you rate your overall health?  Good    Frequency of exercise:  4-5 days/week    Duration of exercise:  Greater than 60 minutes    Do you usually eat at least 4 servings of fruit and vegetables a day, include whole grains    & fiber and avoid regularly eating high fat or \"junk\" foods?  No    Taking medications regularly:  Yes    Medication side effects:  Not applicable    Ability to successfully perform activities of daily living:  No assistance needed    Home Safety:  No safety concerns identified    Hearing Impairment:  No hearing concerns    In the past 6 months, have you been bothered by leaking of urine?  No    In general, how would you rate your overall mental or emotional health?  Excellent      PHQ-2 Total Score: 0    Additional concerns today:  No      Today's PHQ-2 Score:   PHQ-2 ( 1999 [employer]) 11/16/2022   Q1: Little interest or pleasure in doing things 0   Q2: Feeling down, depressed or hopeless 0   PHQ-2 Score 0   PHQ-2 Total Score (12-17 Years)- Positive if 3 or more points; Administer PHQ-A if positive -   Q1: Little interest or pleasure in doing things Not at all   Q2: Feeling down, depressed or hopeless Not at all   PHQ-2 Score 0       Jose C presents today for routine physical and follow up.  He has no complaints today.  He has had an elevated PSA in the past and has been followed by Dr. Williamson for this.  He also questions whether he needs another colonoscopy as he was initially told he could wait ten years but he was unclear after pathology returned as to what the nurse told him.  His asthma has remained stable.       Social History     Tobacco Use     Smoking status: Never     Smokeless tobacco: Never   Substance Use Topics     Alcohol use: Yes     Comment: weekends, beer     If you " drink alcohol do you typically have >3 drinks per day or >7 drinks per week? No    Alcohol Use 11/16/2022   Prescreen: >3 drinks/day or >7 drinks/week? No   Prescreen: >3 drinks/day or >7 drinks/week? -       Last PSA:   PSA   Date Value Ref Range Status   08/21/2020 5.23 (H) 0 - 4 ug/L Final     Comment:     Assay Method:  Chemiluminescence using Siemens Vista analyzer     Prostate Specific Antigen Screen   Date Value Ref Range Status   08/31/2021 6.44 (H) 0.00 - 4.00 ug/L Final     PSA Tumor Marker   Date Value Ref Range Status   03/09/2022 6.42 (H) 0.00 - 4.00 ug/L Final       Reviewed orders with patient. Reviewed health maintenance and updated orders accordingly - Yes  Lab work is in process  BP Readings from Last 3 Encounters:   11/23/22 132/84   07/05/22 136/82   03/09/22 128/62    Wt Readings from Last 3 Encounters:   11/23/22 90.3 kg (199 lb)   03/09/22 90.9 kg (200 lb 6.4 oz)   09/07/21 93.7 kg (206 lb 9.6 oz)                  Patient Active Problem List   Diagnosis     Mild intermittent asthma without complication     ACP (advance care planning)     Elevated PSA     Esophageal foreign body, initial encounter     Past Surgical History:   Procedure Laterality Date     BIOPSY PROSTATE TRANSRECTAL N/A 10/08/2019    Procedure: Transrectal Ultrasound Guided Biopsy of Prostate;  Surgeon: Juliano Williamson MD;  Location:  OR     CIRCUMCISION       COLONOSCOPY  2007    normal     COLONOSCOPY  06/2018    2 small polyps recheck 10 years per patient     esophageal scope N/A 11/2021     EXCISE LESION FACE WITH FLAP PEDICLE Left 08/22/2018    Procedure: EXCISE LESION FACE WITH FLAP PEDICLE;  EXCISION LEFT NASAL BASAL CELL CARCINOMA WITH FROZENS AND FLAP REPAIR  1 x 0.6  3;  Surgeon: Yaneli Reeves MD;  Location: [facility] OR     MRI BIOPSY PROSTATE N/A 10/06/2020    Procedure: MRI guided prostate biopsy;  Surgeon: Juliano Williamson MD;  Location:  OR     ORTHOPEDIC SURGERY  12/2013    left knee meniscus repair        Social History     Tobacco Use     Smoking status: Never     Smokeless tobacco: Never   Substance Use Topics     Alcohol use: Yes     Comment: weekends, beer     Family History   Problem Relation Age of Onset     Cancer Mother 54        lung     Heart Disease Father 75        MI     Other Cancer Brother 65        PASSED FROM PROSTATE CANCER     Genitourinary Problems Brother         enlarged prostate     Prostate Cancer Brother          Current Outpatient Medications   Medication Sig Dispense Refill     fluticasone (FLOVENT HFA) 110 MCG/ACT inhaler INHALE 1 PUFF INTO THE LUNGS TWICE DAILY 12 g 11     albuterol (PROAIR HFA/PROVENTIL HFA/VENTOLIN HFA) 108 (90 Base) MCG/ACT inhaler Inhale 2 puffs into the lungs every 6 hours 1 Inhaler 5     Allergies   Allergen Reactions     Seasonal Allergies Unknown     Sinus Congestion-      Shellfish-Derived Products Rash     Also gets flushed face     Karthaus Meal      Shrimp      Wheat Bran        Reviewed and updated as needed this visit by clinical staff                  Reviewed and updated as needed this visit by Provider                 Past Medical History:   Diagnosis Date     Mild intermittent asthma without complication 6/26/2013    Asthma action plan:  6/26/13      Mild persistent asthma 6/26/2013      Past Surgical History:   Procedure Laterality Date     BIOPSY PROSTATE TRANSRECTAL N/A 10/08/2019    Procedure: Transrectal Ultrasound Guided Biopsy of Prostate;  Surgeon: Juliano Williamson MD;  Location: [facility] OR     CIRCUMCISION       COLONOSCOPY  2007    normal     COLONOSCOPY  06/2018    2 small polyps recheck 10 years per patient     esophageal scope N/A 11/2021     EXCISE LESION FACE WITH FLAP PEDICLE Left 08/22/2018    Procedure: EXCISE LESION FACE WITH FLAP PEDICLE;  EXCISION LEFT NASAL BASAL CELL CARCINOMA WITH FROZENS AND FLAP REPAIR  1 x 0.6  3;  Surgeon: Yaneli Reeves MD;  Location: [facility] OR     MRI BIOPSY PROSTATE N/A 10/06/2020    Procedure: MRI guided  prostate biopsy;  Surgeon: Juliano Williamson MD;  Location:  OR     ORTHOPEDIC SURGERY  12/2013    left knee meniscus repair       Review of Systems  CONSTITUTIONAL: NEGATIVE for fever, chills, change in weight  INTEGUMENTARY/SKIN: NEGATIVE for worrisome rashes, moles or lesions  EYES: NEGATIVE for vision changes or irritation  ENT: NEGATIVE for ear, mouth and throat problems  RESP: NEGATIVE for significant cough or SOB  CV: NEGATIVE for chest pain, palpitations or peripheral edema  GI: NEGATIVE for nausea, abdominal pain, heartburn, or change in bowel habits   male: negative for dysuria, hematuria, decreased urinary stream, erectile dysfunction, urethral discharge  MUSCULOSKELETAL: NEGATIVE for significant arthralgias or myalgia  NEURO: NEGATIVE for weakness, dizziness or paresthesias  PSYCHIATRIC: NEGATIVE for changes in mood or affect    OBJECTIVE:   There were no vitals taken for this visit.    Physical Exam  GENERAL: healthy, alert and no distress  EYES: Eyes grossly normal to inspection, PERRL and conjunctivae and sclerae normal  HENT: ear canals and TM's normal, nose and mouth without ulcers or lesions  NECK: no adenopathy, no asymmetry, masses, or scars and thyroid normal to palpation  RESP: lungs clear to auscultation - no rales, rhonchi or wheezes  CV: regular rate and rhythm, normal S1 S2, no S3 or S4, no murmur, click or rub, no peripheral edema and peripheral pulses strong  ABDOMEN: soft, nontender, no hepatosplenomegaly, no masses and bowel sounds normal  MS: no gross musculoskeletal defects noted, no edema  SKIN: no suspicious lesions or rashes  NEURO: Normal strength and tone, mentation intact and speech normal  PSYCH: mentation appears normal, affect normal/bright    Diagnostic Test Results:  Labs reviewed in Epic    ASSESSMENT/PLAN:   Jose C was seen today for physical.    Diagnoses and all orders for this visit:    Need for vaccination for Strep pneumoniae  -     PNEUMOCOCCAL 20 VALENT CONJUGATE  (PREVNAR 20)    Elevated prostate specific antigen (PSA)  -     PSA Diagnostic; Future    Routine history and physical examination of adult  -     Lipid Profile (Chol, Trig, HDL, LDL calc); Future  -     Comprehensive metabolic panel (BMP + Alb, Alk Phos, ALT, AST, Total. Bili, TP); Future  -     CBC with platelets and differential; Future    Encounter for abdominal aortic aneurysm (AAA) screening  -     US Aorta Medicare AAA Screening; Future        Patient has been advised of split billing requirements and indicates understanding: Yes      COUNSELING:   Reviewed preventive health counseling, as reflected in patient instructions  Special attention given to:        Consider AAA screening for ages 65-75 and > 100 cig smoking history or family history of AAA       Regular exercise       Healthy diet/nutrition       Colorectal cancer screening       Prostate cancer screening        He reports that he has never smoked. He has never used smokeless tobacco.        Patient Name: ZAN LOPEZ   MR#: -[id number]   Specimen #: [id number]   Collected: 6/7/2018   Received: 6/8/2018   Reported: 6/11/2018 13:37   Ordering Phy(s): DAYLIN PRADHAN   Additional Phy(s): STEVE WILD     For improved result formatting, select 'View Enhanced Report Format' under    Linked Documents section.     SPECIMEN(S):   A: Sigmoid colon polyp   B: Rectal polyp     FINAL DIAGNOSIS:   A: Sigmoid colon, polypectomy   - Tubular adenoma   - No evidence of high-grade dysplasia or invasive malignancy     B: Large bowel, rectum, polypectomy   - Hyperplastic polyp     I have personally reviewed all specimens and/or slides, including the   listed special stains, and used them   with my medical judgement to determine or confirm the final diagnosis.     Electronically signed out by:     Nabor Castro M.D.     CLINICAL HISTORY:   Screening colonoscopy; colonoscopy with biopsy polypectomy.     GROSS:   A: There are three pieces of tan soft  tissue which are 2 and 3 mm. (3 TE   in 1 block).     B: There is a 3 mm piece of tan soft tissue. (1 TE in 1 block). (Dictated   by: Nabor Castro MD 6/8/2018 03:54   PM)     MICROSCOPIC:   A:  Microscopic sections show colon mucosa.  There is a tubular adenoma.     The glands vary in size and shape   with decreased mucin production.     B:  Microscopic sections show colon mucosa.  There is a hyperplastic   polyp.  The glands have abundant   eosinophilic cytoplasm with increased mucin production.     CPT Codes   A: 62040-YZ2   B: 69630-ES3     TESTING LAB LOCATION:   [address] [address]   [phone]     COLLECTION SITE:   Client: [facility]   Location:  (B)     Rosalio Lee,   [facility]

## 2023-01-11 ENCOUNTER — HOSPITAL ENCOUNTER (OUTPATIENT)
Dept: ULTRASOUND IMAGING | Facility: HOSPITAL | Age: 68
Discharge: HOME | End: 2023-01-11
Attending: INTERNAL MEDICINE | Admitting: INTERNAL MEDICINE
Payer: MEDICARE

## 2023-01-11 DIAGNOSIS — Z13.6 ENCOUNTER FOR ABDOMINAL AORTIC ANEURYSM (AAA) SCREENING: ICD-10-CM

## 2023-01-11 PROCEDURE — 76706 US ABDL AORTA SCREEN AAA: CPT

## 2023-01-25 DIAGNOSIS — R97.20 ELEVATED PSA: Primary | ICD-10-CM

## 2023-01-25 NOTE — PROGRESS NOTES
"Per last office visit  3/9/22 with Juliano Williamson MD \"Plan  Continue PSA checks every 6 months.              - Acquire PSA with annual labs every fall through PCP.                          - If there are any concerns about the value at that time he can follow-up with me as needed  Follow-up with me once annually every spring with a PSA prior.\"        Orders Placed    " [FreeTextEntry1] : 62 year old female followup with acute exacerbation of chronic back pain radiating down her right leg.  She denies injury.  She is otherwise neurologically intact. Her pain continues to progress.  Her last MRI shows degenerative changes including severe right L5-S1 foraminal stenosis.  This corresponds to her pattern of right L5 radicular pain.  We discussed treatment options including PT, medications, spinal injections and surgery. She does not want surgery at this time.  The patient was given a referral for consideration for spinal injections (Dr. Rosa).  She will continue gabapentin, diclofenac and cyclobenzaprine. She will continue PT. She will followup in 6 weeks.   We discussed red flag symptoms that would require emergent evaluation. She knows to call with any questions or concerns or if her symptoms acutely worsen.

## 2023-03-13 ENCOUNTER — TELEPHONE (OUTPATIENT)
Dept: CARE COORDINATION | Facility: OTHER | Age: 68
End: 2023-03-13

## 2023-03-13 DIAGNOSIS — R97.20 ELEVATED PSA: Primary | ICD-10-CM

## 2023-03-13 NOTE — TELEPHONE ENCOUNTER
Pt is in need of a new Urology referral.  Referral is pended in this encounter.  Please review and sign if appropriate.

## 2023-05-30 ENCOUNTER — TELEPHONE (OUTPATIENT)
Dept: INTERNAL MEDICINE | Facility: OTHER | Age: 68
End: 2023-05-30

## 2023-05-30 NOTE — TELEPHONE ENCOUNTER
Call placed to patient to coordinate a hospital follow up visit with Dr. Lee.     Patient discharged from [facility]- 5/28/23- CVA     Appointment scheduled:     Next 5 appointments (look out 90 days)    Jun 06, 2023 11:30 AM  (Arrive by 11:15 AM)  SHORT with Rosalio Lee DO  [facility] ([facility] ) [address]  [facility], [place] [id number] [phone]

## 2023-05-30 NOTE — TELEPHONE ENCOUNTER
Jose C called to schedule a hospital follow up with Dr. Lee today.  He was discharged from [facility] on Sunday 5/28 from a stroke.  [place] wanted him seen within 7 days.  Dr. Petersen first available is 6/13.  Please call the patient if there is an overbook available this week with Jesus or if he needs to see another provider.  Thank you!

## 2023-06-01 NOTE — PROGRESS NOTES
Assessment & Plan   Problem List Items Addressed This Visit    None  Visit Diagnoses     History of CVA (cerebrovascular accident)    -  Primary           Review of prior external note(s) from - Outside records from [facility]  40 minutes spent by me on the date of the encounter doing chart review, review of outside records, review of test results, interpretation of tests, patient visit, documentation and discussion with family      MED REC REQUIRED  Post Medication Reconciliation Status: discharge medications reconciled and changed, per note/orders      No follow-ups on file.    Rosalio Lee,   [facility]    Milka Woodall is a 68 year old, presenting for the following health issues:  Hospital F/U         View : No data to display.              HANNY Woodall presents today with his wife.  Last week he experienced weakness and dis coordination in his right arm.  He also then experienced some numbness around his right face.  He finished his round and went in to the ER.  He did have a CTA head and neck which was unremarkable in [place].  He was started on Asa and Plavix at that time and transferred to [facility] in [place] where he had further testing including a TTE with bubble study confirming a small PFO.  MRI confirmed left frontal lobe CVA.  He was then placed and Asa and Xeralto and statin and has an upcoming monitor to be worn for 30 days.    He has had no recurrent episodes since and is awaiting his cardiac monitor. He does have upcoming Neurology appointments and will eventually see cardiology as to PFO closure.    He and his wife have various questions as to the timeline of care.  I explained he will wear the monitor for 30 days and then likely see cardiology as to PFO closure.    They also had questions as to activity and I agreed he may resume normal activity in the meantime.        Hospital Follow-up Visit:    Hospital/Nursing Home/IP Rehab Facility: [facility]    Date  of Admission: 5/26/23  Date of Discharge: 5/28/23  Reason(s) for Admission: CVA on 5/25/23    Was your hospitalization related to COVID-19? No   Problems taking medications regularly:  None  Medication changes since discharge: asa 81 mg daily, atorvastatin 40 mg daily  rivaroxaban 20 mg daily   Problems adhering to non-medication therapy:  None    Summary of hospitalization:  CareEverywhere information obtained and reviewed  Diagnostic Tests/Treatments reviewed.  Follow up needed: Neuro and Cardiology  Other Healthcare Providers Involved in Patient s Care:         None  Update since discharge: improved.   Plan of care communicated with patient and family         Review of Systems   Constitutional, HEENT, cardiovascular, pulmonary, gi and gu systems are negative, except as otherwise noted.      Objective    /82 (BP Location: Left arm, Patient Position: Sitting, Cuff Size: Adult Regular)   Pulse 56   Temp 97.3  F (36.3  C) (Tympanic)   Resp 20   Wt 91.2 kg (201 lb)   SpO2 96%   BMI 27.26 kg/m    Body mass index is 27.26 kg/m .  Physical Exam   GENERAL: healthy, alert and no distress  EYES: Eyes grossly normal to inspection, PERRL and conjunctivae and sclerae normal  RESP: lungs clear to auscultation - no rales, rhonchi or wheezes  CV: regular rate and rhythm, normal S1 S2, no S3 or S4, no murmur, click or rub, no peripheral edema and peripheral pulses strong  MS: no gross musculoskeletal defects noted, no edema  SKIN: No brusing  NEURO: CN 2-12 intact  PSYCH: mentation appears normal, affect normal/bright    Office Visit on 11/23/2022   Component Date Value Ref Range Status     PSA Tumor Marker 11/23/2022 4.65 (H)  0.00 - 4.50 ng/mL Final     Cholesterol 11/23/2022 197  <200 mg/dL Final     Triglycerides 11/23/2022 60  <150 mg/dL Final     Direct Measure HDL 11/23/2022 93  >=40 mg/dL Final     LDL Cholesterol Calculated 11/23/2022 92  <=100 mg/dL Final     Non HDL Cholesterol 11/23/2022 104  <130 mg/dL  Final     Sodium 11/23/2022 138  136 - 145 mmol/L Final     Potassium 11/23/2022 4.6  3.4 - 5.3 mmol/L Final     Chloride 11/23/2022 104  98 - 107 mmol/L Final     Carbon Dioxide (CO2) 11/23/2022 23  22 - 29 mmol/L Final     Anion Gap 11/23/2022 11  7 - 15 mmol/L Final     Urea Nitrogen 11/23/2022 14.7  8.0 - 23.0 mg/dL Final     Creatinine 11/23/2022 0.92  0.67 - 1.17 mg/dL Final     Calcium 11/23/2022 9.2  8.8 - 10.2 mg/dL Final     Glucose 11/23/2022 89  70 - 99 mg/dL Final     Alkaline Phosphatase 11/23/2022 89  40 - 129 U/L Final     AST 11/23/2022 24  10 - 50 U/L Final     ALT 11/23/2022 28  10 - 50 U/L Final     Protein Total 11/23/2022 7.2  6.4 - 8.3 g/dL Final     Albumin 11/23/2022 4.2  3.5 - 5.2 g/dL Final     Bilirubin Total 11/23/2022 0.5  <=1.2 mg/dL Final     GFR Estimate 11/23/2022 >90  >60 mL/min/1.73m2 Final    Effective December 21, 2021 eGFRcr in adults is calculated using the 2021 CKD-EPI creatinine equation which includes age and gender (Taqueria et al., [facility], DOI: 10.1056/ANRRpo1857266)     WBC Count 11/23/2022 5.4  4.0 - 11.0 10e3/uL Final     RBC Count 11/23/2022 4.52  4.40 - 5.90 10e6/uL Final     Hemoglobin 11/23/2022 14.5  13.3 - 17.7 g/dL Final     Hematocrit 11/23/2022 42.8  40.0 - 53.0 % Final     MCV 11/23/2022 95  78 - 100 fL Final     MCH 11/23/2022 32.1  26.5 - 33.0 pg Final     MCHC 11/23/2022 33.9  31.5 - 36.5 g/dL Final     RDW 11/23/2022 12.9  10.0 - 15.0 % Final     Platelet Count 11/23/2022 287  150 - 450 10e3/uL Final     % Neutrophils 11/23/2022 53  % Final     % Lymphocytes 11/23/2022 32  % Final     % Monocytes 11/23/2022 10  % Final     % Eosinophils 11/23/2022 4  % Final     % Basophils 11/23/2022 1  % Final     Absolute Neutrophils 11/23/2022 2.9  1.6 - 8.3 10e3/uL Final     Absolute Lymphocytes 11/23/2022 1.8  0.8 - 5.3 10e3/uL Final     Absolute Monocytes 11/23/2022 0.6  0.0 - 1.3 10e3/uL Final     Absolute Eosinophils 11/23/2022 0.2  0.0 - 0.7 10e3/uL Final      Absolute Basophils 11/23/2022 0.0  0.0 - 0.2 10e3/uL Final     No results found for any visits on 06/06/23.  No results found for this or any previous visit (from the past 24 hour(s)).

## 2023-06-02 ASSESSMENT — ASTHMA QUESTIONNAIRES
QUESTION_2 LAST FOUR WEEKS HOW OFTEN HAVE YOU HAD SHORTNESS OF BREATH: NOT AT ALL
QUESTION_4 LAST FOUR WEEKS HOW OFTEN HAVE YOU USED YOUR RESCUE INHALER OR NEBULIZER MEDICATION (SUCH AS ALBUTEROL): NOT AT ALL
QUESTION_3 LAST FOUR WEEKS HOW OFTEN DID YOUR ASTHMA SYMPTOMS (WHEEZING, COUGHING, SHORTNESS OF BREATH, CHEST TIGHTNESS OR PAIN) WAKE YOU UP AT NIGHT OR EARLIER THAN USUAL IN THE MORNING: ONCE OR TWICE
ACT_TOTALSCORE: 24
ACT_TOTALSCORE: 24
QUESTION_1 LAST FOUR WEEKS HOW MUCH OF THE TIME DID YOUR ASTHMA KEEP YOU FROM GETTING AS MUCH DONE AT WORK, SCHOOL OR AT HOME: NONE OF THE TIME
QUESTION_5 LAST FOUR WEEKS HOW WOULD YOU RATE YOUR ASTHMA CONTROL: COMPLETELY CONTROLLED

## 2023-06-06 ENCOUNTER — OFFICE VISIT (OUTPATIENT)
Dept: INTERNAL MEDICINE | Facility: OTHER | Age: 68
End: 2023-06-06
Attending: INTERNAL MEDICINE
Payer: COMMERCIAL

## 2023-06-06 VITALS
HEART RATE: 56 BPM | SYSTOLIC BLOOD PRESSURE: 126 MMHG | BODY MASS INDEX: 27.26 KG/M2 | OXYGEN SATURATION: 96 % | RESPIRATION RATE: 20 BRPM | WEIGHT: 201 LBS | DIASTOLIC BLOOD PRESSURE: 82 MMHG | TEMPERATURE: 97.3 F

## 2023-06-06 DIAGNOSIS — Z86.73 HISTORY OF CVA (CEREBROVASCULAR ACCIDENT): Primary | ICD-10-CM

## 2023-06-06 PROCEDURE — 99215 OFFICE O/P EST HI 40 MIN: CPT | Performed by: INTERNAL MEDICINE

## 2023-06-06 PROCEDURE — G0463 HOSPITAL OUTPT CLINIC VISIT: HCPCS

## 2023-06-06 RX ORDER — ASPIRIN 81 MG/1
81 TABLET ORAL DAILY
COMMUNITY

## 2023-06-06 RX ORDER — FLUTICASONE PROPIONATE 110 UG/1
1 AEROSOL, METERED RESPIRATORY (INHALATION) DAILY
COMMUNITY
End: 2023-10-30

## 2023-06-06 RX ORDER — ATORVASTATIN CALCIUM 40 MG/1
40 TABLET, FILM COATED ORAL DAILY
COMMUNITY
End: 2023-08-16 | Stop reason: ALTCHOICE

## 2023-06-06 ASSESSMENT — PAIN SCALES - GENERAL: PAINLEVEL: NO PAIN (0)

## 2023-06-21 ENCOUNTER — TRANSFERRED RECORDS (OUTPATIENT)
Dept: HEALTH INFORMATION MANAGEMENT | Facility: HOSPITAL | Age: 68
End: 2023-06-21

## 2023-06-23 ENCOUNTER — MYC MEDICAL ADVICE (OUTPATIENT)
Dept: INTERNAL MEDICINE | Facility: OTHER | Age: 68
End: 2023-06-23

## 2023-06-27 NOTE — TELEPHONE ENCOUNTER
Called pt and he did not go into ED. He has had this since January before his stroke, he has had this since he had Covid.    Bad at night when rolling over from back to side and worse from getting up from bed when he put his arm down to get out of bed.6-7/10 pain    Chest pain that runs from under his left armpit to under his right arm pit.     When he gets up for the day and is doing daily activieitsthe pain is about 1-2/10.     Does not worsen with activity.    Did update them about this when he was hospitalized with his stroke and  Neuro last week.    Feels like when he sneezes his chest bone is going to break.    Will update MD and advise if s/s worsen go to ED.      Also per pt his NUPUR is scheduled at [facility].      Please advise.    OVERBOOK?    Kate VEGA RN

## 2023-06-28 ENCOUNTER — VIRTUAL VISIT (OUTPATIENT)
Dept: INTERNAL MEDICINE | Facility: OTHER | Age: 68
End: 2023-06-28
Attending: INTERNAL MEDICINE
Payer: COMMERCIAL

## 2023-06-28 DIAGNOSIS — R07.89 ATYPICAL CHEST PAIN: Primary | ICD-10-CM

## 2023-06-28 PROCEDURE — G0463 HOSPITAL OUTPT CLINIC VISIT: HCPCS

## 2023-06-28 PROCEDURE — 99213 OFFICE O/P EST LOW 20 MIN: CPT | Mod: 95 | Performed by: INTERNAL MEDICINE

## 2023-06-28 ASSESSMENT — ANXIETY QUESTIONNAIRES
5. BEING SO RESTLESS THAT IT IS HARD TO SIT STILL: NOT AT ALL
6. BECOMING EASILY ANNOYED OR IRRITABLE: NOT AT ALL
1. FEELING NERVOUS, ANXIOUS, OR ON EDGE: NOT AT ALL
3. WORRYING TOO MUCH ABOUT DIFFERENT THINGS: NOT AT ALL
2. NOT BEING ABLE TO STOP OR CONTROL WORRYING: NOT AT ALL
GAD7 TOTAL SCORE: 0
7. FEELING AFRAID AS IF SOMETHING AWFUL MIGHT HAPPEN: NOT AT ALL
GAD7 TOTAL SCORE: 0

## 2023-06-28 ASSESSMENT — PATIENT HEALTH QUESTIONNAIRE - PHQ9: 5. POOR APPETITE OR OVEREATING: NOT AT ALL

## 2023-06-28 NOTE — PROGRESS NOTES
Jose C is a 68 year old who is being evaluated via a billable telephone visit.      What phone number would you like to be contacted at? [phone]  How would you like to obtain your AVS? Ngozi  Distant Location (provider location):  On-site    Assessment & Plan   Problem List Items Addressed This Visit    None  Visit Diagnoses     Atypical chest pain    -  Primary    Relevant Orders    NM MPI Treadmill           Review of prior external note(s) from - Outside records from [facility]  20 minutes spent by me on the date of the encounter doing chart review, review of outside records, review of test results, interpretation of tests, patient visit and documentation            No follow-ups on file.    Rosalio Lee, DO  [facility]   Jose C is a 68 year old, presenting for the following health issues:  No chief complaint on file.         No data to display              HPI     Adolfo was contacted via phone today due to some SOB and chest discomfort.  He recently suffered a CVA due to PFO and currently is on Asa, Xeralto and statin.  He reports some ongoing numbness on his right lower face and cheek.    He does have an upcoming NUPUR and follow up with cardiology.  The NUPUR is scheduled for the 7/14 and and his MCOT comes off on 7/9.  Cardiology appt is TBD.    In regard to his chest pain it is somewhat atypical and occurs without exertion.  Discomfort is across his chest from axilla to axilla. He reports vague left arm pain at times also.    Pain History:    Patient experiencing pain - bilateral axillary radiates across upper chest when getting out of bed in the morning. Patient denies pain during the day.     Decreased ROM in neck, experiencing neck pain when turning head sided to side.      When did you first notice your pain? 1/2023   Have you seen this provider for your pain in the past? No   Where in your body do your have pain? Axillary left and right, radiates across upper  chest when getting up out of bed. Decrease ROM in Neck- side to side with neck pain   Are you seeing anyone else for your pain? No  What makes your pain better? None   What makes your pain worse? When getting up out of bed and when turning neck side to side   How has pain affected your ability to work? Not applicable- retired   Who lives in your household? Wife, Sharee Moreland}      8/20/2019     1:00 PM 8/6/2020    11:00 AM 8/31/2021     3:00 PM   PHQ-9 SCORE   PHQ-9 Total Score 0 0 0         8/20/2019     1:00 PM 8/31/2021     3:00 PM 6/28/2023     1:50 PM   IRISH-7 SCORE   Total Score 0 0 0         6/28/2023     1:50 PM   PEG Score   PEG Total Score 2.33       Review of Systems   Constitutional, HEENT, cardiovascular, pulmonary, gi and gu systems are negative, except as otherwise noted.      Objective           Vitals:  No vitals were obtained today due to virtual visit.    Physical Exam   alert and no distress  PSYCH: Alert and oriented times 3; coherent speech, normal   rate and volume, able to articulate logical thoughts, able   to abstract reason, no tangential thoughts, no hallucinations   or delusions  His affect is normal  RESP: No cough, no audible wheezing, able to talk in full sentences  Remainder of exam unable to be completed due to telephone visits    Office Visit on 11/23/2022   Component Date Value Ref Range Status     PSA Tumor Marker 11/23/2022 4.65 (H)  0.00 - 4.50 ng/mL Final     Cholesterol 11/23/2022 197  <200 mg/dL Final     Triglycerides 11/23/2022 60  <150 mg/dL Final     Direct Measure HDL 11/23/2022 93  >=40 mg/dL Final     LDL Cholesterol Calculated 11/23/2022 92  <=100 mg/dL Final     Non HDL Cholesterol 11/23/2022 104  <130 mg/dL Final     Sodium 11/23/2022 138  136 - 145 mmol/L Final     Potassium 11/23/2022 4.6  3.4 - 5.3 mmol/L Final     Chloride 11/23/2022 104  98 - 107 mmol/L Final     Carbon Dioxide (CO2) 11/23/2022 23  22 - 29 mmol/L Final     Anion Gap 11/23/2022 11  7 - 15 mmol/L  Final     Urea Nitrogen 11/23/2022 14.7  8.0 - 23.0 mg/dL Final     Creatinine 11/23/2022 0.92  0.67 - 1.17 mg/dL Final     Calcium 11/23/2022 9.2  8.8 - 10.2 mg/dL Final     Glucose 11/23/2022 89  70 - 99 mg/dL Final     Alkaline Phosphatase 11/23/2022 89  40 - 129 U/L Final     AST 11/23/2022 24  10 - 50 U/L Final     ALT 11/23/2022 28  10 - 50 U/L Final     Protein Total 11/23/2022 7.2  6.4 - 8.3 g/dL Final     Albumin 11/23/2022 4.2  3.5 - 5.2 g/dL Final     Bilirubin Total 11/23/2022 0.5  <=1.2 mg/dL Final     GFR Estimate 11/23/2022 >90  >60 mL/min/1.73m2 Final    Effective December 21, 2021 eGFRcr in adults is calculated using the 2021 CKD-EPI creatinine equation which includes age and gender (Taqueria et al., NEJ, DOI: 10.1056/FJNBzj9820594)     WBC Count 11/23/2022 5.4  4.0 - 11.0 10e3/uL Final     RBC Count 11/23/2022 4.52  4.40 - 5.90 10e6/uL Final     Hemoglobin 11/23/2022 14.5  13.3 - 17.7 g/dL Final     Hematocrit 11/23/2022 42.8  40.0 - 53.0 % Final     MCV 11/23/2022 95  78 - 100 fL Final     MCH 11/23/2022 32.1  26.5 - 33.0 pg Final     MCHC 11/23/2022 33.9  31.5 - 36.5 g/dL Final     RDW 11/23/2022 12.9  10.0 - 15.0 % Final     Platelet Count 11/23/2022 287  150 - 450 10e3/uL Final     % Neutrophils 11/23/2022 53  % Final     % Lymphocytes 11/23/2022 32  % Final     % Monocytes 11/23/2022 10  % Final     % Eosinophils 11/23/2022 4  % Final     % Basophils 11/23/2022 1  % Final     Absolute Neutrophils 11/23/2022 2.9  1.6 - 8.3 10e3/uL Final     Absolute Lymphocytes 11/23/2022 1.8  0.8 - 5.3 10e3/uL Final     Absolute Monocytes 11/23/2022 0.6  0.0 - 1.3 10e3/uL Final     Absolute Eosinophils 11/23/2022 0.2  0.0 - 0.7 10e3/uL Final     Absolute Basophils 11/23/2022 0.0  0.0 - 0.2 10e3/uL Final     No results found for any visits on 06/28/23.  No results found for this or any previous visit (from the past 24 hour(s)).            Phone call duration: 10 minutes

## 2023-07-03 ENCOUNTER — TELEPHONE (OUTPATIENT)
Dept: INTERNAL MEDICINE | Facility: OTHER | Age: 68
End: 2023-07-03

## 2023-07-03 NOTE — TELEPHONE ENCOUNTER
Good Morning,    This patient was scheduled for a Nuclear Medicine Treadmill Stress Test on 7-17-23.   Due to a treadmill safety recall our overseeing physician and staff have decided to change the order to a Lexiscan.    The new order has been placed and sent to Dr. Shen for signature.    The appointments will stay for the same date.    Thank you,  Mary

## 2023-07-12 ENCOUNTER — TELEPHONE (OUTPATIENT)
Dept: INTERNAL MEDICINE | Facility: OTHER | Age: 68
End: 2023-07-12

## 2023-07-12 NOTE — TELEPHONE ENCOUNTER
I wouldn't be terribly concerned.  We could check a Hgb.  He is more prone to bleeding with Xeralto and likely just scratched his gums somewhere.

## 2023-07-12 NOTE — TELEPHONE ENCOUNTER
Patient notified on provider's recommendation and verbalized understanding.   Patient states discolored sputum is now gone. Patient agrees with PCP that he must have scratched his gums.

## 2023-07-12 NOTE — TELEPHONE ENCOUNTER
"Patient calling and reports spitting in the toilet and noticed the spit was discolored, \"light pink\". Patient requesting advice if he is to be concerned with this.   Patient reports brushing his teeth two hours prior to this and was resting playing his guitar.  Patient reports a \"slight headache\".  Denies chest pain, difficulty breathing, blood in urine or stool, dizziness or recent injury.  Reports spouse had taken BP while on the phone with nurse and BP was 147/82 and HR 58.  Please advise, thank you.   "

## 2023-07-14 ENCOUNTER — TELEPHONE (OUTPATIENT)
Dept: NUCLEAR MEDICINE | Facility: HOSPITAL | Age: 68
End: 2023-07-14

## 2023-07-17 ENCOUNTER — HOSPITAL ENCOUNTER (OUTPATIENT)
Dept: NUCLEAR MEDICINE | Facility: HOSPITAL | Age: 68
Setting detail: NUCLEAR MEDICINE
Discharge: HOME | End: 2023-07-17
Attending: INTERNAL MEDICINE
Payer: MEDICARE

## 2023-07-17 ENCOUNTER — HOSPITAL ENCOUNTER (OUTPATIENT)
Dept: CARDIOLOGY | Facility: HOSPITAL | Age: 68
Setting detail: NUCLEAR MEDICINE
Discharge: HOME | End: 2023-07-17
Attending: INTERNAL MEDICINE
Payer: MEDICARE

## 2023-07-17 DIAGNOSIS — R07.89 ATYPICAL CHEST PAIN: ICD-10-CM

## 2023-07-17 LAB
CV BLOOD PRESSURE: 62 MMHG
CV STRESS MAX HR HE: 82
NUC STRESS EJECTION FRACTION: 51 %
RATE PRESSURE PRODUCT: NORMAL
STRESS ECHO BASELINE DIASTOLIC HE: 78
STRESS ECHO BASELINE HR: 51 BPM
STRESS ECHO BASELINE SYSTOLIC BP: 120
STRESS ECHO CALCULATED PERCENT HR: 53 %
STRESS ECHO LAST STRESS DIASTOLIC BP: 72
STRESS ECHO LAST STRESS SYSTOLIC BP: 132
STRESS ECHO TARGET HR: 154

## 2023-07-17 PROCEDURE — 343N000001 HC RX 343: Performed by: RADIOLOGY

## 2023-07-17 PROCEDURE — A9500 TC99M SESTAMIBI: HCPCS | Performed by: RADIOLOGY

## 2023-07-17 PROCEDURE — 250N000011 HC RX IP 250 OP 636: Performed by: INTERNAL MEDICINE

## 2023-07-17 PROCEDURE — 93017 CV STRESS TEST TRACING ONLY: CPT

## 2023-07-17 PROCEDURE — 93016 CV STRESS TEST SUPVJ ONLY: CPT | Performed by: INTERNAL MEDICINE

## 2023-07-17 PROCEDURE — 93018 CV STRESS TEST I&R ONLY: CPT | Performed by: INTERNAL MEDICINE

## 2023-07-17 PROCEDURE — 78452 HT MUSCLE IMAGE SPECT MULT: CPT

## 2023-07-17 RX ORDER — AMINOPHYLLINE 25 MG/ML
INJECTION, SOLUTION INTRAVENOUS
Status: DISCONTINUED
Start: 2023-07-17 | End: 2023-07-17 | Stop reason: WASHOUT

## 2023-07-17 RX ORDER — REGADENOSON 0.08 MG/ML
0.4 INJECTION, SOLUTION INTRAVENOUS ONCE
Status: COMPLETED | OUTPATIENT
Start: 2023-07-17 | End: 2023-07-17

## 2023-07-17 RX ADMIN — Medication 31.7 MILLICURIE: at 09:00

## 2023-07-17 RX ADMIN — Medication 10.2 MILLICURIE: at 07:16

## 2023-07-17 RX ADMIN — REGADENOSON 0.4 MG: 0.08 INJECTION, SOLUTION INTRAVENOUS at 09:09

## 2023-07-23 ENCOUNTER — HEALTH MAINTENANCE LETTER (OUTPATIENT)
Age: 68
End: 2023-07-23

## 2023-07-24 ENCOUNTER — MYC MEDICAL ADVICE (OUTPATIENT)
Dept: INTERNAL MEDICINE | Facility: OTHER | Age: 68
End: 2023-07-24

## 2023-07-25 NOTE — TELEPHONE ENCOUNTER
Called pt and updated on below. He is wondering the base of what? And would this be r/t the pain he gets when he is lying down?    He already had seen Dr Zaragoza and  with Cardiology, [facility].    He will see Neuro,  and Cardiology again on 8.21.2023.    He had the NUPUR and they are working with Neuro to see if he needs more testing for blood clotting before going forward with the closure of the PFO with .    Please advise.    Kate VEGA RN

## 2023-07-25 NOTE — TELEPHONE ENCOUNTER
It showed some abnormality at base, not sure if diaphragmatic interference or real.  When is Cardiology appt?

## 2023-08-11 ENCOUNTER — TRANSFERRED RECORDS (OUTPATIENT)
Dept: HEALTH INFORMATION MANAGEMENT | Facility: CLINIC | Age: 68
End: 2023-08-11

## 2023-08-11 LAB — EJECTION FRACTION: 60 %

## 2023-08-14 ENCOUNTER — TELEPHONE (OUTPATIENT)
Dept: INTERNAL MEDICINE | Facility: OTHER | Age: 68
End: 2023-08-14

## 2023-08-16 ENCOUNTER — OFFICE VISIT (OUTPATIENT)
Dept: FAMILY MEDICINE | Facility: OTHER | Age: 68
End: 2023-08-16
Attending: NURSE PRACTITIONER
Payer: COMMERCIAL

## 2023-08-16 VITALS
SYSTOLIC BLOOD PRESSURE: 124 MMHG | BODY MASS INDEX: 26.23 KG/M2 | WEIGHT: 193.4 LBS | TEMPERATURE: 96.9 F | HEART RATE: 76 BPM | RESPIRATION RATE: 17 BRPM | OXYGEN SATURATION: 98 % | DIASTOLIC BLOOD PRESSURE: 70 MMHG

## 2023-08-16 DIAGNOSIS — Z87.74 STATUS POST PATENT FORAMEN OVALE CLOSURE: ICD-10-CM

## 2023-08-16 DIAGNOSIS — Z09 HOSPITAL DISCHARGE FOLLOW-UP: Primary | ICD-10-CM

## 2023-08-16 DIAGNOSIS — R09.89 CHEST CONGESTION: ICD-10-CM

## 2023-08-16 DIAGNOSIS — I63.9 CEREBROVASCULAR ACCIDENT (CVA), UNSPECIFIED MECHANISM (H): ICD-10-CM

## 2023-08-16 PROBLEM — T18.108A ESOPHAGEAL FOREIGN BODY, INITIAL ENCOUNTER: Status: RESOLVED | Noted: 2021-09-12 | Resolved: 2023-08-16

## 2023-08-16 PROCEDURE — G0463 HOSPITAL OUTPT CLINIC VISIT: HCPCS

## 2023-08-16 PROCEDURE — 99214 OFFICE O/P EST MOD 30 MIN: CPT | Performed by: NURSE PRACTITIONER

## 2023-08-16 RX ORDER — ALBUTEROL SULFATE 90 UG/1
2 AEROSOL, METERED RESPIRATORY (INHALATION) EVERY 6 HOURS
Qty: 18 G | Refills: 11 | Status: SHIPPED | OUTPATIENT
Start: 2023-08-16

## 2023-08-16 RX ORDER — CLOPIDOGREL BISULFATE 75 MG/1
75 TABLET ORAL DAILY
COMMUNITY
Start: 2023-08-12 | End: 2024-02-13

## 2023-08-16 RX ORDER — PRAVASTATIN SODIUM 20 MG
1 TABLET ORAL DAILY
COMMUNITY
Start: 2023-08-15 | End: 2023-11-22

## 2023-08-16 ASSESSMENT — ANXIETY QUESTIONNAIRES
7. FEELING AFRAID AS IF SOMETHING AWFUL MIGHT HAPPEN: NOT AT ALL
IF YOU CHECKED OFF ANY PROBLEMS ON THIS QUESTIONNAIRE, HOW DIFFICULT HAVE THESE PROBLEMS MADE IT FOR YOU TO DO YOUR WORK, TAKE CARE OF THINGS AT HOME, OR GET ALONG WITH OTHER PEOPLE: NOT DIFFICULT AT ALL
GAD7 TOTAL SCORE: 0
GAD7 TOTAL SCORE: 0
2. NOT BEING ABLE TO STOP OR CONTROL WORRYING: NOT AT ALL
5. BEING SO RESTLESS THAT IT IS HARD TO SIT STILL: NOT AT ALL
3. WORRYING TOO MUCH ABOUT DIFFERENT THINGS: NOT AT ALL
6. BECOMING EASILY ANNOYED OR IRRITABLE: NOT AT ALL
1. FEELING NERVOUS, ANXIOUS, OR ON EDGE: NOT AT ALL

## 2023-08-16 ASSESSMENT — PAIN SCALES - GENERAL: PAINLEVEL: NO PAIN (0)

## 2023-08-16 ASSESSMENT — PATIENT HEALTH QUESTIONNAIRE - PHQ9
5. POOR APPETITE OR OVEREATING: NOT AT ALL
SUM OF ALL RESPONSES TO PHQ QUESTIONS 1-9: 0

## 2023-08-16 NOTE — PROGRESS NOTES
Assessment & Plan         Hospital discharge follow-up  - Chart updated      Status post patent foramen ovale closure  - Stable -  Cardiology Follow-up as scheduled      Cerebrovascular accident (CVA), unspecified mechanism (H)  - Stable -  Follow-up with      Asthma  - albuterol (PROAIR HFA/PROVENTIL HFA/VENTOLIN HFA) 108 (90 Base) MCG/ACT inhaler; Inhale 2 puffs into the lungs every 6 hours        Lynn Chau CNP  [facility] - KILLIAN Woodall is a 68 year old, presenting for the following health issues:  Hospital F/U      Hospital Follow-up Visit:  Hospital/Nursing Home/IP Rehab Facility:  [facility]  Date of Admission: 08/11/2023  Date of Discharge: 08/11/2023  Reason(s) for Admission: PFO closure  Was your hospitalization related to COVID-19? No   Problems taking medications regularly:  None  Medication changes since discharge: None  Problems adhering to non-medication therapy:  None  Summary of hospitalization:  CareEverywhere information obtained and reviewed  Diagnostic Tests/Treatments reviewed.  Follow up needed: Cardiology - Neurology  Other Healthcare Providers Involved in Patient s Care:         None  Update since discharge: stable.    Plan of care communicated with patient        Status:  He is doing very well S/P  PFO closure and CVA  No residual right arm weakness  No aphasia, weakness, facial numbness, paraesthesia or other CNS abnormality  Has a cardiology appointment in 1 week for follow up  Sees Neurologist same day         SUMMARY:  Jose C Corona is a 68 year old male who experienced right upper extremity weakness and numbness. Work-up revealed a left frontal infarction. He was seen by stroke neurology on telestroke and follow-up was arranged for on 06/21/23. NUPUR showed small patent foramen ovale. Long-term heart monitor did not show atrial fibrillation. He was placed on epiric Xarelto for anticoagulation pending cardiology consultation. He saw James Reynaga MD on  07/18/23. Please refer to clinic documentation for full detail. His PFO anatomy was favorable for closure and he presented on 7H 08/11/23 for elective procedure. Jose C is status post successful percutaneous closure of the patent foramen ovale under intracardiac echocardiographic and fluoroscopic guidance, using a 30mm septal occluder device with excellent device position and seal. His right common femoral venous access sites are SFD without hematoma or ecchymosis. His right and left sided filling pressures were normal. Limited echocardiogram post-procedure demonstrated normal seating on the interatrial septum. He has no evidence of residual shunt. EKG with sinus bradycardia, otherwise normal EKG. He is appropriate for discharge home with the support of his family.     STUDY RESULTS:    Limited echocardiogram 08/11/23  Normal LV size and function, qualitative EF 60%.  Normal RV size and function.  No pericardial effusion.  Normal IVC size and reactivity.  30 mm Amplatzer Occluder with normal seating on the interatrial septum. No evidence of residual shunt by color flow doppler.  Normal IVC size and mean right atrial pressure.    Aortic root and ascending aorta enlarged- 4.5 cm root. Ascending aorta not fully evaluated-see prior report 7/14/2023 for additional details.    DISCHARGE CONDITION: good. BP: 138/73, Temp: 36.7  C (98.1  F) Oral, Pulse: 64, Resp: 14, SpO2: 97 %    DISPOSITION: the patient is being discharged to their home under the care of family members.    Electronically signed by James Reynaga MD at 08/11/2023 5:33 PM CDT         Patient Active Problem List   Diagnosis    Mild intermittent asthma without complication    ACP (advance care planning)    Elevated PSA    CVA (cerebral vascular accident) (H)    Status post patent foramen ovale closure     Past Surgical History:   Procedure Laterality Date    BIOPSY PROSTATE TRANSRECTAL N/A 10/08/2019    Procedure: Transrectal Ultrasound Guided Biopsy of  Prostate;  Surgeon: Juliano Williamson MD;  Location: [facility] OR    CIRCUMCISION      COLONOSCOPY  2007    normal    COLONOSCOPY  06/2018    2 small polyps recheck 10 years per patient    esophageal scope N/A 11/2021    EXCISE LESION FACE WITH FLAP PEDICLE Left 08/22/2018    Procedure: EXCISE LESION FACE WITH FLAP PEDICLE;  EXCISION LEFT NASAL BASAL CELL CARCINOMA WITH FROZENS AND FLAP REPAIR  1 x 0.6  3;  Surgeon: Yaneli Reeves MD;  Location: [facility] OR    MRI BIOPSY PROSTATE N/A 10/06/2020    Procedure: MRI guided prostate biopsy;  Surgeon: Juliano Williamson MD;  Location: [facility] OR    ORTHOPEDIC SURGERY  12/2013    left knee meniscus repair       Social History     Tobacco Use    Smoking status: Never    Smokeless tobacco: Never   Substance Use Topics    Alcohol use: Yes     Comment: weekends, beer     Family History   Problem Relation Age of Onset    Cancer Mother 54        lung    Heart Disease Father 75        MI    Other Cancer Brother 65        PASSED FROM PROSTATE CANCER    Genitourinary Problems Brother         enlarged prostate    Prostate Cancer Brother                Current Outpatient Medications   Medication Sig Dispense Refill    albuterol (PROAIR HFA/PROVENTIL HFA/VENTOLIN HFA) 108 (90 Base) MCG/ACT inhaler Inhale 2 puffs into the lungs every 6 hours 18 g 11    clopidogrel (PLAVIX) 75 MG tablet Take 75 mg by mouth daily      fluticasone (FLOVENT HFA) 110 MCG/ACT inhaler Inhale 1 puff into the lungs daily      pravastatin (PRAVACHOL) 20 MG tablet Take 1 tablet by mouth daily      rivaroxaban ANTICOAGULANT (XARELTO) 20 MG TABS tablet Take 20 mg by mouth daily (with dinner)      aspirin 81 MG EC tablet Take 81 mg by mouth daily (Patient not taking: Reported on 8/16/2023)           Allergies   Allergen Reactions    Food Swelling     Sod       Esophagus swelling    Seasonal Allergies Unknown     Sinus Congestion-     Shellfish-Derived Products Rash     Also gets flushed face    Wheat Germ Oil Other (See Comments)      Sinus Congestion-    Ellerbe Meal     Shrimp     Wheat Bran          Recent Labs   Lab Test 11/23/22  0837 08/31/21  1433 08/21/20  0845 08/20/19  1427   LDL 92 77 96 85   HDL 93 97 83 85   TRIG 60 97 56 97   ALT 28 41 33 41   CR 0.92 0.88 0.89 0.87   GFRESTIMATED >90 >90 89 >90   GFRESTBLACK  --   --  103 >90   POTASSIUM 4.6 4.3 4.5 4.1          BP Readings from Last 3 Encounters:   08/16/23 124/70   06/06/23 126/82   11/23/22 132/84    Wt Readings from Last 3 Encounters:   08/16/23 87.7 kg (193 lb 6.4 oz)   06/06/23 91.2 kg (201 lb)   11/23/22 90.3 kg (199 lb)                 Review of Systems   Constitutional, HEENT, cardiovascular, pulmonary, GI, , musculoskeletal, neuro, skin, endocrine and psych systems are negative, except as otherwise noted.          Objective    /70 (BP Location: Right arm, Patient Position: Sitting, Cuff Size: Adult Regular)   Pulse 76   Temp 96.9  F (36.1  C) (Tympanic)   Resp 17   Wt 87.7 kg (193 lb 6.4 oz)   SpO2 98%   BMI 26.23 kg/m    Body mass index is 26.23 kg/m .        Physical Exam   GENERAL: healthy, alert and no distress  EYES: Eyes grossly normal to inspection, PERRL and conjunctivae and sclerae normal  NECK: no adenopathy, no asymmetry, masses, or scars and thyroid normal to palpation  RESP: lungs clear to auscultation - no rales, rhonchi or wheezes  CV: regular rate and rhythm, normal S1 S2, no S3 or S4, no murmur, click or rub, no peripheral edema and peripheral pulses strong  MS: no gross musculoskeletal defects noted, no edema  SKIN: no suspicious lesions or rashes  NEURO: Normal strength and tone, mentation intact and speech normal  PSYCH: mentation appears normal, affect normal/bright

## 2023-08-16 NOTE — LETTER
My Asthma Action Plan    Name: Jose C Corona   YOB: 1955  Date: 8/16/2023   My doctor: Lynn Chau CNP   My clinic: [facility]        My Rescue Medicine:   Albuterol inhaler (Proair/Ventolin/Proventil HFA)  2-4 puffs EVERY 4 HOURS as needed. Use a spacer if recommended by your provider.   My Asthma Severity:   Intermittent / Exercise Induced  Know your asthma triggers:   dust mites  pollens  animal dander  humidity          GREEN ZONE   Good Control  I feel good  No cough or wheeze  Can work, sleep and play without asthma symptoms       Take your asthma control medicine every day.     If exercise triggers your asthma, take your rescue medication  15 minutes before exercise or sports, and  During exercise if you have asthma symptoms  Spacer to use with inhaler: If you have a spacer, make sure to use it with your inhaler             YELLOW ZONE Getting Worse  I have ANY of these:  I do not feel good  Cough or wheeze  Chest feels tight  Wake up at night   Keep taking your Green Zone medications  Start taking your rescue medicine:  every 20 minutes for up to 1 hour. Then every 4 hours for 24-48 hours.  If you stay in the Yellow Zone for more than 12-24 hours, contact your doctor.  If you do not return to the Green Zone in 12-24 hours or you get worse, start taking your oral steroid medicine if prescribed by your provider.           RED ZONE Medical Alert - Get Help  I have ANY of these:  I feel awful  Medicine is not helping  Breathing getting harder  Trouble walking or talking  Nose opens wide to breathe       Take your rescue medicine NOW  If your provider has prescribed an oral steroid medicine, start taking it NOW  Call your doctor NOW  If you are still in the Red Zone after 20 minutes and you have not reached your doctor:  Take your rescue medicine again and  Call 911 or go to the emergency room right away    See your regular doctor within 2 weeks of an Emergency Room or  Urgent Care visit for follow-up treatment.          Annual Reminders:  Meet with Asthma Educator,  Flu Shot in the Fall, consider Pneumonia Vaccination for patients with asthma (aged 19 and older).    Pharmacy:    [id number] - [facility] [address] AT [facility] [address] - [facility] [address]    Electronically signed by Lynn Chau CNP   Date: 08/16/23                    Asthma Triggers  How To Control Things That Make Your Asthma Worse    Triggers are things that make your asthma worse.  Look at the list below to help you find your triggers and   what you can do about them. You can help prevent asthma flare-ups by staying away from your triggers.      Trigger                                                          What you can do   Cigarette Smoke  Tobacco smoke can make asthma worse. Do not allow smoking in your home, car or around you.  Be sure no one smokes at a child s day care or school.  If you smoke, ask your health care provider for ways to help you quit.  Ask family members to quit too.  Ask your health care provider for a referral to Quit Plan to help you quit smoking, or call 2-553-091-PLAN.     Colds, Flu, Bronchitis  These are common triggers of asthma. Wash your hands often.  Don t touch your eyes, nose or mouth.  Get a flu shot every year.     Dust Mites  These are tiny bugs that live in cloth or carpet. They are too small to see. Wash sheets and blankets in hot water every week.   Encase pillows and mattress in dust mite proof covers.  Avoid having carpet if you can. If you have carpet, vacuum weekly.   Use a dust mask and HEPA vacuum.   Pollen and Outdoor Mold  Some people are allergic to trees, grass, or weed pollen, or molds. Try to keep your windows closed.  Limit time out doors when pollen count is high.   Ask you health care provider about taking medicine during allergy season.     Animal Dander  Some people are allergic to skin  flakes, urine or saliva from pets with fur or feathers. Keep pets with fur or feathers out of your home.    If you can t keep the pet outdoors, then keep the pet out of your bedroom.  Keep the bedroom door closed.  Keep pets off cloth furniture and away from stuffed toys.     Mice, Rats, and Cockroaches  Some people are allergic to the waste from these pests.   Cover food and garbage.  Clean up spills and food crumbs.  Store grease in the refrigerator.   Keep food out of the bedroom.   Indoor Mold  This can be a trigger if your home has high moisture. Fix leaking faucets, pipes, or other sources of water.   Clean moldy surfaces.  Dehumidify basement if it is damp and smelly.   Smoke, Strong Odors, and Sprays  These can reduce air quality. Stay away from strong odors and sprays, such as perfume, powder, hair spray, paints, smoke incense, paint, cleaning products, candles and new carpet.   Exercise or Sports  Some people with asthma have this trigger. Be active!  Ask your doctor about taking medicine before sports or exercise to prevent symptoms.    Warm up for 5-10 minutes before and after sports or exercise.     Other Triggers of Asthma  Cold air:  Cover your nose and mouth with a scarf.  Sometimes laughing or crying can be a trigger.  Some medicines and food can trigger asthma.

## 2023-08-16 NOTE — PATIENT INSTRUCTIONS
Assessment & Plan         Hospital discharge follow-up  - Chart updated      Status post patent foramen ovale closure  - Stable -  Cardiology Follow-up as scheduled      Cerebrovascular accident (CVA), unspecified mechanism (H)  - Stable -  Follow-up with      Asthma  - albuterol (PROAIR HFA/PROVENTIL HFA/VENTOLIN HFA) 108 (90 Base) MCG/ACT inhaler; Inhale 2 puffs into the lungs every 6 hours        Lynn Chau CNP  [facility] [place] [facility]

## 2023-10-04 ENCOUNTER — TELEPHONE (OUTPATIENT)
Dept: INTERNAL MEDICINE | Facility: OTHER | Age: 68
End: 2023-10-04

## 2023-10-04 ENCOUNTER — IMMUNIZATION (OUTPATIENT)
Dept: FAMILY MEDICINE | Facility: OTHER | Age: 68
End: 2023-10-04
Attending: INTERNAL MEDICINE
Payer: MEDICARE

## 2023-10-04 DIAGNOSIS — Z98.818 OTHER DENTAL PROCEDURE STATUS: Primary | ICD-10-CM

## 2023-10-04 PROCEDURE — 90662 IIV NO PRSV INCREASED AG IM: CPT

## 2023-10-04 NOTE — TELEPHONE ENCOUNTER
Patient has an appointment with the dentist on 10/11/23 and due to heart surgery he had on 8/11/23. Patient needs to be prescribed amoxicillin 2g before he goes to the dentist. Would you be able to fill this prescription for him? Please call patient once it is filled and if you have any questions.     [facility] in [place], [place]

## 2023-10-05 RX ORDER — AMOXICILLIN 500 MG/1
CAPSULE ORAL
Qty: 4 CAPSULE | Refills: 1 | Status: SHIPPED | OUTPATIENT
Start: 2023-10-05 | End: 2023-11-22

## 2023-10-05 NOTE — TELEPHONE ENCOUNTER
Call returned to patient, no answer, will continue to attempt to reach patient to provide update below:    Rosalio Lee, Pia Barraza RN  Amox sent

## 2023-10-30 DIAGNOSIS — J45.20 MILD INTERMITTENT ASTHMA WITHOUT COMPLICATION: Primary | ICD-10-CM

## 2023-10-30 RX ORDER — DEXAMETHASONE 4 MG/1
1 TABLET ORAL 2 TIMES DAILY
Qty: 12 G | Refills: 3 | Status: SHIPPED | OUTPATIENT
Start: 2023-10-30

## 2023-10-30 NOTE — TELEPHONE ENCOUNTER
Flovent      Last Written Prescription Date:  6/28/22  Last Fill Quantity: 12g,   # refills: 11  Last Office Visit: 8/16/23  Future Office visit:    Next 5 appointments (look out 90 days)      Nov 22, 2023 11:00 AM  (Arrive by 10:45 AM)  SHORT with oRsalio Lee DO  [facility] ([facility] ) [address]  [place] [address] [phone]             Routing refill request to provider for review/approval because:

## 2023-11-08 ENCOUNTER — TELEPHONE (OUTPATIENT)
Dept: FAMILY MEDICINE | Facility: OTHER | Age: 68
End: 2023-11-08

## 2023-11-08 NOTE — TELEPHONE ENCOUNTER
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 Skin is intact, denies LOC, lump is small-approximately 3/4\" diameter. Patient inquiring what to do as he is currently on plavix.     Dr. Lee spoke with patient providing update to provide compression to area, ice (ensuring something is between ice and skin), continue to monitor the area.     Patient verbalized understanding, patient will reach out with any additional concerns.       "

## 2023-11-21 NOTE — PROGRESS NOTES
Assessment & Plan   Problem List Items Addressed This Visit          Nervous and Auditory    CVA (cerebral vascular accident) (H)     Other Visit Diagnoses       S/P percutaneous patent foramen ovale closure    -  Primary             Review of prior external note(s) from - Outside records from [facility]  25 minutes spent by me on the date of the encounter doing chart review, review of outside records, review of test results, interpretation of tests, patient visit, and documentation            No follow-ups on file.    Rosalio Lee, [facility]   Jose C is a 68 year old, presenting for the following health issues:  Chronic Disease Management      HPI     Jose C presents today for follow-up.  His history is remarkable for cryptogenic stroke secondary to PFO status post PFO closure in August 2023.  He was previously on Xarelto also however he did undergo a MCOT which did not reveal any underlying A-fib hence Xarelto was discontinued.  Post PFO closure he has remained on Plavix.  Decision was made to continue Plavix 480 days hence he has approximately 90 additional days remaining after which time he will discontinue Plavix and only remain on baby aspirin daily.  He is also been on various statins in the recent past however has not tolerated those due to myalgias.     He is otherwise doing well and denies any concerns today.  He is up-to-date with his immunizations except for RSV.  No lab work is needed today.  He again does have questions as to whether or not he is due for colonoscopy and there was some question as to this when he had his last physical in February 2022 at which time I believe there was some recommendation to have a repeat colonoscopy done in 5 years.  We will further check into this once again as if needed he will look to complete this in the spring 2024.        Hyperlipidemia Follow-Up    Are you regularly taking any medication or supplement to lower  your cholesterol?   No, stopped taking due side effects (pain across shoulders, neck, muscles of upper body) Has previously used Lipitor, Crestor, and prevastatin- per patient report cardiology and neurology agreed to stop medication on 9/2/23  Are you having muscle aches or other side effects that you think could be caused by your cholesterol lowering medication?  No    Cerebrovascular Follow-up    Patient history: 5/25/23 frontal lobe  Residual symptoms: None (initial numbness right arm and right side of mouth has resolved)  Worsened or new symptoms since last visit: No  Daily aspirin use: Yes  Hypertension controlled: Yes    Asthma Follow-Up    Was ACT completed today?  Yes        11/22/2023    10:55 AM   ACT Total Scores   ACT TOTAL SCORE (Goal Greater than or Equal to 20) 25   In the past 12 months, how many times did you visit the emergency room for your asthma without being admitted to the hospital? 0   In the past 12 months, how many times were you hospitalized overnight because of your asthma? 0        How many days per week do you miss taking your asthma controller medication?  0  Please describe any recent triggers for your asthma: None  Have you had any Emergency Room Visits, Urgent Care Visits, or Hospital Admissions since your last office visit?  No          Review of Systems   Constitutional, HEENT, cardiovascular, pulmonary, gi and gu systems are negative, except as otherwise noted.      Objective    /76 (BP Location: Left arm, Patient Position: Sitting, Cuff Size: Adult Large)   Pulse 60   Temp 98.4  F (36.9  C) (Tympanic)   Resp 16   Wt 88.6 kg (195 lb 6.4 oz)   SpO2 97%   BMI 26.50 kg/m    Body mass index is 26.5 kg/m .  Physical Exam   GENERAL: healthy, alert and no distress  RESP: lungs clear to auscultation - no rales, rhonchi or wheezes  CV: regular rate and rhythm, normal S1 S2, no S3 or S4, no murmur, click or rub, no peripheral edema and peripheral pulses strong  PSYCH:  mentation appears normal, affect normal/bright    Transferred Records on 08/11/2023   Component Date Value Ref Range Status    Ejection Fraction 08/11/2023 60  % Final     No results found for any visits on 11/22/23.  No results found for this or any previous visit (from the past 24 hour(s)).

## 2023-11-22 ENCOUNTER — OFFICE VISIT (OUTPATIENT)
Dept: INTERNAL MEDICINE | Facility: OTHER | Age: 68
End: 2023-11-22
Attending: INTERNAL MEDICINE
Payer: COMMERCIAL

## 2023-11-22 VITALS
WEIGHT: 195.4 LBS | BODY MASS INDEX: 26.5 KG/M2 | OXYGEN SATURATION: 97 % | SYSTOLIC BLOOD PRESSURE: 127 MMHG | DIASTOLIC BLOOD PRESSURE: 76 MMHG | TEMPERATURE: 98.4 F | RESPIRATION RATE: 16 BRPM | HEART RATE: 60 BPM

## 2023-11-22 DIAGNOSIS — Z87.74 S/P PERCUTANEOUS PATENT FORAMEN OVALE CLOSURE: Primary | ICD-10-CM

## 2023-11-22 DIAGNOSIS — I63.89 CEREBROVASCULAR ACCIDENT (CVA) DUE TO OTHER MECHANISM (H): ICD-10-CM

## 2023-11-22 PROCEDURE — 99214 OFFICE O/P EST MOD 30 MIN: CPT | Performed by: INTERNAL MEDICINE

## 2023-11-22 PROCEDURE — G0463 HOSPITAL OUTPT CLINIC VISIT: HCPCS

## 2023-11-22 ASSESSMENT — PAIN SCALES - GENERAL: PAINLEVEL: NO PAIN (0)

## 2023-11-22 ASSESSMENT — ASTHMA QUESTIONNAIRES: ACT_TOTALSCORE: 25

## 2023-11-22 NOTE — Clinical Note
Corbin- There was some question as to repeat colonoscopy and believe he was going to get it done this year but had a CVA.  Anyway, is he due in 5 or 10 years from last scope in 2018 Dr Kendall??  Enoch said 10 years in procedure but he claims he was called and told 5 years.

## 2023-11-30 DIAGNOSIS — Z12.11 COLON CANCER SCREENING: Primary | ICD-10-CM

## 2023-12-04 ENCOUNTER — TELEPHONE (OUTPATIENT)
Dept: INTERNAL MEDICINE | Facility: OTHER | Age: 68
End: 2023-12-04

## 2023-12-04 DIAGNOSIS — Z12.11 ENCOUNTER FOR SCREENING COLONOSCOPY: Primary | ICD-10-CM

## 2023-12-04 RX ORDER — BISACODYL 5 MG/1
10 TABLET, DELAYED RELEASE ORAL ONCE
Qty: 2 TABLET | Refills: 0 | Status: SHIPPED | OUTPATIENT
Start: 2023-12-04 | End: 2023-12-04

## 2023-12-04 NOTE — TELEPHONE ENCOUNTER
Patient scheduled screening colonoscopy 03/01/24 with miracle Nath bowel prep ordered and instructions mailed today.  Patient will need pre-op for anesthesia and he is aware.

## 2023-12-04 NOTE — TELEPHONE ENCOUNTER
Screening Questions for the Scheduling of Screening Colonoscopies     (If Colonoscopy is diagnostic, Provider should review the chart before scheduling.)    Are you younger than 50 or older than 80?  No    Do you take aspirin or fish oil?  Yes:  (if yes, tell patient to stop 1 week prior to Colonoscopy)    Do you take warfarin (Coumadin), clopidogrel (Plavix), apixaban (Eliquis), dabigatram (Pradaxa), rivaroxaban (Xarelto) or any blood thinner? Yes:     Do you use oxygen at home?  No    Do you have kidney disease? No    Are you on dialysis? No    Have you had a stroke or heart attack in the last year? Yes    Have you had a stent in your heart or any blood vessel in the last year? No    Have you had a transplant of any organ?  No    Have you had a colonoscopy or upper endoscopy (EGD) before?  YES. Date-.         Date of scheduled Colonoscopy: 3/1/24    Provider: Dr. Nelson     [facility]

## 2023-12-05 ENCOUNTER — TELEPHONE (OUTPATIENT)
Dept: FAMILY MEDICINE | Facility: OTHER | Age: 68
End: 2023-12-05

## 2023-12-05 NOTE — TELEPHONE ENCOUNTER
Call placed to patient, notified referral has been sent to Dr. Nelson for Colonoscopy per Dr. Lee.     Patient reports appointment scheduled for Colonoscopy on 3/1/24. RN CC assisted patient with coordinating Pre-op with Dr. Lee as per below.     Next 5 appointments (look out 90 days)      Feb 13, 2024  8:30 AM  (Arrive by 8:15 AM)  Pre-Op physical with Rosalio Lee,   [facility] ([facility] ) [address]  [place] [id number] [phone]

## 2024-02-12 NOTE — PROGRESS NOTES
Preoperative Evaluation  [facility]  [address]  [place] [address]  Phone: [phone]  Primary Provider: Rosalio Koo  Pre-op Performing Provider: ROSALIO KOO  Feb 13, 2024       Jose C is a 68 year old, presenting for the following:  No chief complaint on file.      Surgical Information  Surgery/Procedure: colonoscopy   Surgery Location: [facility]   Surgeon: Dr. Nelson   Surgery Date: 3/1/24  Time of Surgery: TBD  Where patient plans to recover: At home with family  Fax number for surgical facility: Note does not need to be faxed, will be available electronically in Epic.    Assessment & Plan     The proposed surgical procedure is considered LOW risk.    Problem List Items Addressed This Visit    None  Visit Diagnoses       Pre-op exam    -  Primary    Relevant Orders    CBC with platelets and differential (Completed)    Comprehensive metabolic panel (BMP + Alb, Alk Phos, ALT, AST, Total. Bili, TP)    EKG 12-lead complete w/read - (Clinic Performed) (Completed)    Neck pain        Relevant Orders    XR SHOULDER LEFT G/E 2 VIEWS (Clinic Performed) (Completed)    Other chest pain        Left shoulder pain, unspecified chronicity        Relevant Orders    XR SHOULDER LEFT G/E 2 VIEWS (Clinic Performed) (Completed)               - No identified additional risk factors other than previously addressed    Antiplatelet or Anticoagulation Medication Instructions   - aspirin: Discontinue aspirin 7-10 days prior to procedure to reduce bleeding risk. It should be resumed postoperatively.     Additional Medication Instructions  Patient is on no additional chronic medicationsexcept inhalers    Recommendation  APPROVAL GIVEN to proceed with proposed procedure, without further diagnostic evaluation.    Review of prior external note(s) from - Outside records from [facility]  30 minutes spent by me on the date of the encounter doing chart review, review  of outside records, review of test results, interpretation of tests, patient visit, and documentation     Subjective       HPI related to upcoming procedure: Colonoscopy, colon cancer screening.    In addition Crow did report ongoing discomfort across his chest.  He has obviously undergone extensive workup due to his history of stroke and PFO.  In addition he has been following with cardiology hence there is no concern that this is of a cardiac etiology which he is aware of.  He did have his PFO closed and does have scheduled follow-up for that.  Nevertheless due to his ongoing complaint we did discuss discomfort in his neck and left shoulder.  Hence today decision was made to image these beyond the preoperative assessment.  Patient may require further evaluation with this including MRI of the C-spine and/or left shoulder to rule out any impingement or rotator cuff issues.          2/6/2024     1:26 PM   Preop Questions   1. Have you ever had a heart attack or stroke? YES - 5/25/23   2. Have you ever had surgery on your heart or blood vessels, such as a stent placement, a coronary artery bypass, or surgery on an artery in your head, neck, heart, or legs? YES - 8/11/23   3. Do you have chest pain with activity? No   4. Do you have a history of  heart failure? No   5. Do you currently have a cold, bronchitis or symptoms of other infection? No   6. Do you have a cough, shortness of breath, or wheezing? No   7. Do you or anyone in your family have previous history of blood clots? YES - Sister    8. Do you or does anyone in your family have a serious bleeding problem such as prolonged bleeding following surgeries or cuts? No   9. Have you ever had problems with anemia or been told to take iron pills? No   10. Have you had any abnormal blood loss such as black, tarry or bloody stools? No   11. Have you ever had a blood transfusion? No   12. Are you willing to have a blood transfusion if it is medically needed before,  during, or after your surgery? Yes   13. Have you or any of your relatives ever had problems with anesthesia? No   14. Do you have sleep apnea, excessive snoring or daytime drowsiness? No   15. Do you have any artifical heart valves or other implanted medical devices like a pacemaker, defibrillator, or continuous glucose monitor? YES -    15a. What type of device do you have? PFO closure - occluder   15b. Name of the clinic that manages your device:  [facility]   16. Do you have artificial joints? No   17. Are you allergic to latex? No       Health Care Directive  Patient has a Health Care Directive on file  [id number]}    Status of Chronic Conditions:  History of CVA secondary to PFO.  Post PFO closure.  History of mild intermittent asthma.  History of mildly elevated PSA.    Patient Active Problem List    Diagnosis Date Noted    CVA (cerebral vascular accident) (H) 08/16/2023     Priority: Medium    Status post patent foramen ovale closure 08/16/2023     Priority: Medium    Elevated PSA 09/03/2020     Priority: Medium     Added automatically from request for surgery [id number]      ACP (advance care planning) 09/02/2016     Priority: Medium     Advance Care Planning 11/11/2016: Receipt of ACP document:  Received: Health Care Directive which was witnessed or notarized on 8-12-16.  Document previously scanned on 9-7-16.  Validation form completed and sent to be scanned.  Code Status reflects choices in most recent ACP document.  Confirmed/documented designated decision maker(s).  Added by Leann Diego RN Advance Care Planning Liaison with Carlito Mcgee  Advance Care Planning 9/14/2016: ACP Review of Chart / Resources Provided:  Reviewed chart for advance care plan.  Jose C Corona has an up to date advance care plan on file.  Added by Alyssa Gill  Advance Care Planning 9/2/2016: ACP Review of Chart / Resources Provided:  Reviewed chart for advance care plan.  Jose C Corona has been provided information and  resources to begin or update their advance care plan.  Added by Alyssa Gill            Mild intermittent asthma without complication 06/26/2013     Priority: Medium     Asthma action plan:  6/26/13        Past Medical History:   Diagnosis Date    Mild intermittent asthma without complication 6/26/2013    Asthma action plan:  6/26/13     Mild persistent asthma 6/26/2013     Past Surgical History:   Procedure Laterality Date    BIOPSY PROSTATE TRANSRECTAL N/A 10/08/2019    Procedure: Transrectal Ultrasound Guided Biopsy of Prostate;  Surgeon: Juliano Williamson MD;  Location: [facility] OR    CIRCUMCISION      COLONOSCOPY  2007    normal    COLONOSCOPY  06/2018    2 small polyps recheck 10 years per patient    esophageal scope N/A 11/2021    EXCISE LESION FACE WITH FLAP PEDICLE Left 08/22/2018    Procedure: EXCISE LESION FACE WITH FLAP PEDICLE;  EXCISION LEFT NASAL BASAL CELL CARCINOMA WITH FROZENS AND FLAP REPAIR  1 x 0.6  3;  Surgeon: Yaneli Reeves MD;  Location: [facility] OR    MRI BIOPSY PROSTATE N/A 10/06/2020    Procedure: MRI guided prostate biopsy;  Surgeon: Juliano Williamson MD;  Location:  OR    ORTHOPEDIC SURGERY  12/2013    left knee meniscus repair     Current Outpatient Medications   Medication Sig Dispense Refill    albuterol (PROAIR HFA/PROVENTIL HFA/VENTOLIN HFA) 108 (90 Base) MCG/ACT inhaler Inhale 2 puffs into the lungs every 6 hours 18 g 11    aspirin 81 MG EC tablet Take 81 mg by mouth daily      clopidogrel (PLAVIX) 75 MG tablet Take 75 mg by mouth daily      fluticasone (FLOVENT HFA) 110 MCG/ACT inhaler INHALE 1 PUFF INTO THE LUNGS TWICE DAILY 12 g 3       Allergies   Allergen Reactions    Food Swelling     Cairo       Esophagus swelling    Seasonal Allergies Unknown     Sinus Congestion-     Shellfish-Derived Products Rash     Also gets flushed face    Wheat Germ Oil Other (See Comments)     Sinus Congestion-    Cairo Meal     Shrimp     Wheat Bran         Social History     Tobacco Use    Smoking  status: Never    Smokeless tobacco: Never   Substance Use Topics    Alcohol use: Yes     Comment: weekends, beer     Family History   Problem Relation Age of Onset    Cancer Mother 54        lung    Heart Disease Father 75        MI    Other Cancer Brother 65        PASSED FROM PROSTATE CANCER    Genitourinary Problems Brother         enlarged prostate    Prostate Cancer Brother      History   Drug Use No         Review of Systems    Review of Systems  Constitutional, neuro, ENT, endocrine, pulmonary, cardiac, gastrointestinal, genitourinary, musculoskeletal, integument and psychiatric systems are negative, except as otherwise noted.  Objective    There were no vitals taken for this visit.   Estimated body mass index is 26.5 kg/m  as calculated from the following:    Height as of 8/31/21: 1.829 m (6').    Weight as of 11/22/23: 88.6 kg (195 lb 6.4 oz).  Physical Exam  GENERAL: alert and no distress  EYES: Eyes grossly normal to inspection, PERRL and conjunctivae and sclerae normal  HENT: ear canals and TM's normal, nose and mouth without ulcers or lesions  RESP: lungs clear to auscultation - no rales, rhonchi or wheezes  CV: regular rate and rhythm, normal S1 S2, no S3 or S4, no murmur, click or rub, no peripheral edema  ABDOMEN: soft, nontender, no hepatosplenomegaly, no masses and bowel sounds normal  MS: Slightly limited range of motion of the neck in all directions sidebending rotation flexion and extension.  And slightly limited internal and external rotation of the left arm and shoulder.  SKIN: no suspicious lesions or rashes  NEURO: Normal strength and tone, mentation intact and speech normal  PSYCH: mentation appears normal, affect normal/bright    Recent Labs   Lab Test 11/23/22  0837   HGB 14.5         POTASSIUM 4.6   CR 0.92        Diagnostics  Recent Results (from the past 240 hour(s))   EKG 12-lead complete w/read - (Clinic Performed)    Collection Time: 02/13/24  8:56 AM   Result Value Ref  Range    Systolic Blood Pressure  mmHg    Diastolic Blood Pressure  mmHg    Ventricular Rate 48 BPM    Atrial Rate 48 BPM    OK Interval 176 ms    QRS Duration 98 ms     ms    QTc 393 ms    P Axis 62 degrees    R AXIS 43 degrees    T Axis 44 degrees    Interpretation ECG       Sinus bradycardia  Increased R/S ratio in V1, consider early transition or posterior infarct  Abnormal ECG  No previous ECGs available  Confirmed by MD Lee Anthony ([id number]) on 2/13/2024 9:26:08 AM     Comprehensive metabolic panel (BMP + Alb, Alk Phos, ALT, AST, Total. Bili, TP)    Collection Time: 02/13/24  9:05 AM   Result Value Ref Range    Sodium 140 135 - 145 mmol/L    Potassium 4.8 3.4 - 5.3 mmol/L    Carbon Dioxide (CO2) 24 22 - 29 mmol/L    Anion Gap 10 7 - 15 mmol/L    Urea Nitrogen 10.9 8.0 - 23.0 mg/dL    Creatinine 0.81 0.67 - 1.17 mg/dL    GFR Estimate >90 >60 mL/min/1.73m2    Calcium 9.1 8.8 - 10.2 mg/dL    Chloride 106 98 - 107 mmol/L    Glucose 84 70 - 99 mg/dL    Alkaline Phosphatase 106 40 - 150 U/L    AST 21 0 - 45 U/L    ALT 24 0 - 70 U/L    Protein Total 6.9 6.4 - 8.3 g/dL    Albumin 3.9 3.5 - 5.2 g/dL    Bilirubin Total 0.4 <=1.2 mg/dL   CBC with platelets and differential    Collection Time: 02/13/24  9:05 AM   Result Value Ref Range    WBC Count 7.4 4.0 - 11.0 10e3/uL    RBC Count 4.47 4.40 - 5.90 10e6/uL    Hemoglobin 13.9 13.3 - 17.7 g/dL    Hematocrit 40.5 40.0 - 53.0 %    MCV 91 78 - 100 fL    MCH 31.1 26.5 - 33.0 pg    MCHC 34.3 31.5 - 36.5 g/dL    RDW 13.0 10.0 - 15.0 %    Platelet Count 323 150 - 450 10e3/uL    % Neutrophils 62 %    % Lymphocytes 27 %    % Monocytes 7 %    % Eosinophils 4 %    % Basophils 1 %    % Immature Granulocytes 0 %    Absolute Neutrophils 4.6 1.6 - 8.3 10e3/uL    Absolute Lymphocytes 2.0 0.8 - 5.3 10e3/uL    Absolute Monocytes 0.5 0.0 - 1.3 10e3/uL    Absolute Eosinophils 0.3 0.0 - 0.7 10e3/uL    Absolute Basophils 0.1 0.0 - 0.2 10e3/uL    Absolute Immature Granulocytes 0.0  <=0.4 10e3/uL      EKG: appears normal, NSR, with early repolarization pattern.    Revised Cardiac Risk Index (RCRI)  The patient has the following serious cardiovascular risks for perioperative complications:   - No serious cardiac risks = 0 points     RCRI Interpretation: 0 points: Class I (very low risk - 0.4% complication rate)         Signed Electronically by: Rosalio Lee DO  Copy of this evaluation report is provided to requesting physician.

## 2024-02-13 ENCOUNTER — ANCILLARY PROCEDURE (OUTPATIENT)
Dept: GENERAL RADIOLOGY | Facility: OTHER | Age: 69
End: 2024-02-13
Attending: INTERNAL MEDICINE
Payer: MEDICARE

## 2024-02-13 ENCOUNTER — OFFICE VISIT (OUTPATIENT)
Dept: INTERNAL MEDICINE | Facility: OTHER | Age: 69
End: 2024-02-13
Attending: INTERNAL MEDICINE
Payer: COMMERCIAL

## 2024-02-13 VITALS
HEART RATE: 58 BPM | TEMPERATURE: 97.7 F | WEIGHT: 201 LBS | DIASTOLIC BLOOD PRESSURE: 78 MMHG | BODY MASS INDEX: 27.26 KG/M2 | OXYGEN SATURATION: 98 % | SYSTOLIC BLOOD PRESSURE: 128 MMHG | RESPIRATION RATE: 20 BRPM

## 2024-02-13 DIAGNOSIS — Z01.818 PRE-OP EXAM: Primary | ICD-10-CM

## 2024-02-13 DIAGNOSIS — M54.2 NECK PAIN: ICD-10-CM

## 2024-02-13 DIAGNOSIS — R07.89 OTHER CHEST PAIN: ICD-10-CM

## 2024-02-13 DIAGNOSIS — M25.512 LEFT SHOULDER PAIN, UNSPECIFIED CHRONICITY: ICD-10-CM

## 2024-02-13 LAB
ALBUMIN SERPL BCG-MCNC: 3.9 G/DL (ref 3.5–5.2)
ALP SERPL-CCNC: 106 U/L (ref 40–150)
ALT SERPL W P-5'-P-CCNC: 24 U/L (ref 0–70)
ANION GAP SERPL CALCULATED.3IONS-SCNC: 10 MMOL/L (ref 7–15)
AST SERPL W P-5'-P-CCNC: 21 U/L (ref 0–45)
ATRIAL RATE - MUSE: 48 BPM
BASOPHILS # BLD AUTO: 0.1 10E3/UL (ref 0–0.2)
BASOPHILS NFR BLD AUTO: 1 %
BILIRUB SERPL-MCNC: 0.4 MG/DL
BUN SERPL-MCNC: 10.9 MG/DL (ref 8–23)
CALCIUM SERPL-MCNC: 9.1 MG/DL (ref 8.8–10.2)
CHLORIDE SERPL-SCNC: 106 MMOL/L (ref 98–107)
CREAT SERPL-MCNC: 0.81 MG/DL (ref 0.67–1.17)
DEPRECATED HCO3 PLAS-SCNC: 24 MMOL/L (ref 22–29)
DIASTOLIC BLOOD PRESSURE - MUSE: NORMAL MMHG
EGFRCR SERPLBLD CKD-EPI 2021: >90 ML/MIN/1.73M2
EOSINOPHIL # BLD AUTO: 0.3 10E3/UL (ref 0–0.7)
EOSINOPHIL NFR BLD AUTO: 4 %
ERYTHROCYTE [DISTWIDTH] IN BLOOD BY AUTOMATED COUNT: 13 % (ref 10–15)
GLUCOSE SERPL-MCNC: 84 MG/DL (ref 70–99)
HCT VFR BLD AUTO: 40.5 % (ref 40–53)
HGB BLD-MCNC: 13.9 G/DL (ref 13.3–17.7)
IMM GRANULOCYTES # BLD: 0 10E3/UL
IMM GRANULOCYTES NFR BLD: 0 %
INTERPRETATION ECG - MUSE: NORMAL
LYMPHOCYTES # BLD AUTO: 2 10E3/UL (ref 0.8–5.3)
LYMPHOCYTES NFR BLD AUTO: 27 %
MCH RBC QN AUTO: 31.1 PG (ref 26.5–33)
MCHC RBC AUTO-ENTMCNC: 34.3 G/DL (ref 31.5–36.5)
MCV RBC AUTO: 91 FL (ref 78–100)
MONOCYTES # BLD AUTO: 0.5 10E3/UL (ref 0–1.3)
MONOCYTES NFR BLD AUTO: 7 %
NEUTROPHILS # BLD AUTO: 4.6 10E3/UL (ref 1.6–8.3)
NEUTROPHILS NFR BLD AUTO: 62 %
P AXIS - MUSE: 62 DEGREES
PLATELET # BLD AUTO: 323 10E3/UL (ref 150–450)
POTASSIUM SERPL-SCNC: 4.8 MMOL/L (ref 3.4–5.3)
PR INTERVAL - MUSE: 176 MS
PROT SERPL-MCNC: 6.9 G/DL (ref 6.4–8.3)
QRS DURATION - MUSE: 98 MS
QT - MUSE: 440 MS
QTC - MUSE: 393 MS
R AXIS - MUSE: 43 DEGREES
RBC # BLD AUTO: 4.47 10E6/UL (ref 4.4–5.9)
SODIUM SERPL-SCNC: 140 MMOL/L (ref 135–145)
SYSTOLIC BLOOD PRESSURE - MUSE: NORMAL MMHG
T AXIS - MUSE: 44 DEGREES
VENTRICULAR RATE- MUSE: 48 BPM
WBC # BLD AUTO: 7.4 10E3/UL (ref 4–11)

## 2024-02-13 PROCEDURE — 80053 COMPREHEN METABOLIC PANEL: CPT | Mod: ZL | Performed by: INTERNAL MEDICINE

## 2024-02-13 PROCEDURE — G0463 HOSPITAL OUTPT CLINIC VISIT: HCPCS | Mod: 25

## 2024-02-13 PROCEDURE — 36415 COLL VENOUS BLD VENIPUNCTURE: CPT | Mod: ZL | Performed by: INTERNAL MEDICINE

## 2024-02-13 PROCEDURE — G0463 HOSPITAL OUTPT CLINIC VISIT: HCPCS

## 2024-02-13 PROCEDURE — 72040 X-RAY EXAM NECK SPINE 2-3 VW: CPT | Mod: TC,FY

## 2024-02-13 PROCEDURE — 93010 ELECTROCARDIOGRAM REPORT: CPT | Mod: 76 | Performed by: INTERNAL MEDICINE

## 2024-02-13 PROCEDURE — 73030 X-RAY EXAM OF SHOULDER: CPT | Mod: TC,LT,FY

## 2024-02-13 PROCEDURE — 93005 ELECTROCARDIOGRAM TRACING: CPT | Performed by: INTERNAL MEDICINE

## 2024-02-13 PROCEDURE — 85025 COMPLETE CBC W/AUTO DIFF WBC: CPT | Mod: ZL | Performed by: INTERNAL MEDICINE

## 2024-02-13 PROCEDURE — 99215 OFFICE O/P EST HI 40 MIN: CPT | Performed by: INTERNAL MEDICINE

## 2024-02-13 ASSESSMENT — PAIN SCALES - GENERAL: PAINLEVEL: MILD PAIN (2)

## 2024-02-14 ENCOUNTER — TELEPHONE (OUTPATIENT)
Dept: INTERNAL MEDICINE | Facility: OTHER | Age: 69
End: 2024-02-14

## 2024-02-14 DIAGNOSIS — M25.512 LEFT SHOULDER PAIN, UNSPECIFIED CHRONICITY: ICD-10-CM

## 2024-02-14 DIAGNOSIS — M54.2 NECK PAIN: Primary | ICD-10-CM

## 2024-02-14 NOTE — TELEPHONE ENCOUNTER
8:15 AM    Reason for Call: Phone Call    Description: Patient called in wanting to talk with Pia about what the two of them discussed on 2-13-24. Patient has reconsidered what was discussed and would like to start Physical Therapy. Please call patient back.     Was an appointment offered for this call? No  If yes : Appointment type              Date    Preferred method for responding to this message: Telephone Call  What is your phone number ? [phone]     If we cannot reach you directly, may we leave a detailed response at the number you provided? Yes    Can this message wait until your PCP/provider returns, if available today? Not applicable, PROVIDER IN CLINIC    Alyssa Adrian

## 2024-02-14 NOTE — ADDENDUM NOTE
Addended by: ARSLAN KOO on: 2/14/2024 11:32 AM     Modules accepted: Level of Service    
Ambulatory

## 2024-02-14 NOTE — TELEPHONE ENCOUNTER
Call returned to patient. Patient is now requesting to start PT, requesting PT order to be sent to [facility].

## 2024-02-19 ENCOUNTER — NURSE TRIAGE (OUTPATIENT)
Dept: INTERNAL MEDICINE | Facility: OTHER | Age: 69
End: 2024-02-19

## 2024-02-19 NOTE — TELEPHONE ENCOUNTER
Call from patient reporting Left elbow swelling starting over the weekend.     Swelling arm sudden onset - left elbow - tingling, sac of fluid, has been working in garage, may have bumped the elbow. Denies redness or skin being warm to touch, denies fever.     Nurse Triage SBAR    Is this a 2nd Level Triage? NO    Situation: Call from patient reporting Elbow Swelling- Left     Background: patient reports working in the garage, however does not recall injuring the elbow.     Assessment: Swelling - left elbow, discomfort when resting on a surface, denies redness to the area or fever. Patient noticed the swelling over the weekend, appears to be fluid.     Protocol Recommended Disposition:   See PCP Within 24 Hours    Recommendation: Appointment scheduled with Dr. Lee - patient advised to go to ED/UC if redness develops or fever is present. May apply ice to area as per Care Advice.     Next 5 appointments (look out 90 days)      Feb 20, 2024 12:00 PM  (Arrive by 11:45 AM)  SHORT with Rosalio Lee, DO  [facility] ([facility] [address] [id number] [phone]             Does the patient meet one of the following criteria for ADS visit consideration? No       Reason for Disposition   [1] Painful joint AND [2] no fever    Additional Information   Negative: Followed an elbow injury   Negative: Elbow pain is main symptom   Negative: [1] Elbow pain AND [2] fever   Negative: [1] Elbow redness AND [2] fever   Negative: Patient sounds very sick or weak to the triager   Negative: [1] SEVERE pain (e.g., excruciating, unable to use hand or wrist at all) AND [2] not improved after 2 hours of pain medicine   Negative: [1] Looks infected (spreading redness, pus) AND [2] large red area (> 2 in. or 5 cm)   Negative: All of arm looks swollen   Negative: SEVERE joint swelling (e.g., can barely bend or move elbow joint)    Answer Assessment  "- Initial Assessment Questions  1. LOCATION: \"Where is the swelling?\" (e.g., left, right, both elbows)      Elbow - LT      2. SIZE and DESCRIPTION: \"What does the swelling look like?\" (e.g., entire elbow, localized)      Localized     3. ONSET: \"When did the swelling start?\" \"Does it come and go, or is it there all the time?\"      Over the weekend, swelling is constant, appearance of fluid     4. SETTING: \"Has there been any recent work, exercise or other activity that involved that part of the body?\"       Patient reports working in garage recently, may be bumped the elbow, unable to recall a specific injury to elbow.     5. AGGRAVATING FACTORS: \"What makes the elbow swelling worse?\" (e.g., work, sports activities)      When applying pressure to elbow or resting on a surface.     6. ASSOCIATED SYMPTOMS: \"Is there any pain or redness?\"      Pain present, no redness    7. OTHER SYMPTOMS: \"Do you have any other symptoms?\" (e.g., fever)      Denies fever     8. PREGNANCY: \"Is there any chance you are pregnant?\" \"When was your last menstrual period?\"      No    Protocols used: Elbow Swelling-A-AH    "

## 2024-02-20 ENCOUNTER — OFFICE VISIT (OUTPATIENT)
Dept: FAMILY MEDICINE | Facility: OTHER | Age: 69
End: 2024-02-20
Attending: NURSE PRACTITIONER
Payer: COMMERCIAL

## 2024-02-20 VITALS
OXYGEN SATURATION: 98 % | SYSTOLIC BLOOD PRESSURE: 130 MMHG | HEART RATE: 58 BPM | TEMPERATURE: 96.8 F | BODY MASS INDEX: 27.31 KG/M2 | DIASTOLIC BLOOD PRESSURE: 80 MMHG | WEIGHT: 201.4 LBS | RESPIRATION RATE: 16 BRPM

## 2024-02-20 DIAGNOSIS — M70.22 OLECRANON BURSITIS OF LEFT ELBOW: Primary | ICD-10-CM

## 2024-02-20 PROCEDURE — 99213 OFFICE O/P EST LOW 20 MIN: CPT | Performed by: NURSE PRACTITIONER

## 2024-02-20 PROCEDURE — G0463 HOSPITAL OUTPT CLINIC VISIT: HCPCS

## 2024-02-20 ASSESSMENT — PAIN SCALES - GENERAL: PAINLEVEL: NO PAIN (0)

## 2024-02-20 NOTE — PROGRESS NOTES
Assessment & Plan     Olecranon bursitis of left elbow  Start acetaminophen 1000 mg every 6-8 hours. Also start diclofenac 1% gel to the left elbow 3-4 days.   Avoid putting pressure on the elbow  See AVS - patient education material reviewed with Jose C.       Return if symptoms worsen or fail to improve.    Subjective   Jose C is a 68 year old, presenting for the following health issues:  Musculoskeletal Problem    HPI         Concern - Left elbow swelling   Onset: 3-4 days  Description: swelling left elbow  Intensity: 0/10  Progression of Symptoms:  improving  Accompanying Signs & Symptoms: none  Previous history of similar problem: none  Precipitating factors:        Worsened by: nothing  Alleviating factors:        Improved by: icing  Therapies tried and outcome: ice          Objective    /80 (BP Location: Right arm, Patient Position: Sitting, Cuff Size: Adult Regular)   Pulse 58   Temp 96.8  F (36  C) (Tympanic)   Resp 16   Wt 91.4 kg (201 lb 6.4 oz)   SpO2 98%   BMI 27.31 kg/m    Body mass index is 27.31 kg/m .  Physical Exam   GENERAL: alert and no distress  MS: Left elbow: mild enlargement of olecranon bursa with well defined sac of fluid approximately 3 cm diameter palpable. This area is generally non-tender. No erythema, increased skin temperature, or edema of surrounding tissues.           Signed Electronically by: Danitza Leslie CNP     Clinic Care Coordination Contact    Follow Up Progress Note      Assessment: Patient reports his biggest source of pain is severe neuropathy in his feet.  He reports pain is 8-9, when walking feels like he is walking on glass.  Difficulty walking on carpeting.  He reports poor balance, no recent falls.  He states his feet will spasm and become numb.  Patient reports he can walk 25-50 feet, but then needs to rest, sit down, bend his toes.  Patient reports he uses w/c for most mobility, but when he sees his friends, often on weekends, he will walk accompanied by them.  Patient reports current muscle relaxers are minimally helpful.  He reports having been prescribed medical marijuana, but not pursued as of yet as will lose gun license and hunting is very important to him.  Patient plans to research this law further before making a decision.  SW offered to send the # in [employer] for the office of Medical Marijuana.  Patient accepted.      Patient reports having [employer] food delivery, he often picks up this food.  He reports he supplements this as needed with grocery trips with friend.      Patient reports PCA not in place as of yet.  He is working with the [facility] on options.      Patient reports plan to schedule follow up psychiatry visit, he cannot recall which provider he saw.  MAI will review chart and send that information.  MAI reviewed the chart, it seems patient was seen by psychologist.  A new adult mental health referral was placed 10/18/2023 for psychology.      Patient reports having an annual pretransplant meeting which he states went well.  He currently has dialysis M, W, and Friday      Care Gaps:    Health Maintenance Due   Topic Date Due    YEARLY PREVENTIVE VISIT  Never done    HF ACTION PLAN  Never done    MICROALBUMIN  Never done    PARATHYROID  Never done    ADVANCE CARE PLANNING  Never done    HEPATITIS A IMMUNIZATION (1 of 2 - Risk 2-dose series) Never done    HEPATITIS B IMMUNIZATION (1 of 3 - Risk  "Dialysis 4-dose series) Never done    URINE DRUG SCREEN  12/09/2021    LIPID  06/28/2023     Did not address today, patient is very focused on concerns noted above and it is difficult to discuss anything further     Care Plans  Care Plan: Mental Health       Problem: Needs to schedule psyhiatry appt                   Care Plan: Food resources       Problem: Difficulty obtaining food resources due to limited transportation                   Care Plan: Discussion with providers to ensure all are in sync       Problem: Concern that all providers may not be in sync with his care       Goal: I will discuss my current needs, ensure my care team is in sync with my care needs       Start Date: 9/1/2023 Expected End Date: 2/16/2024    This Visit's Progress: 40% Recent Progress: 10%    Priority: High    Note:     Barriers: concern all care team members are not in sync with his care needs   Strengths: will discuss above at pre transplant assessment visit in September, will call and schedule provider visit to update, has been in contact to coordinate is \"support care team\" for potential future transplant   Patient expressed understanding of goal:   Action steps to achieve this goal:yes  1. I will find time to call and schedule provider appt  2. I will arrange medical transport  3. I will discuss my current needs                               Intervention/Education provided during outreach: SW will provide the # for Medical Marijuana and contact information for psychiatry      Plan:     CHW will follow up in 1 month, Care Coordinator will review progress to goals and plan of care in 6 weeks.     Patient may possibly be ready for maintenance on next call     Paz Shirley, TISHAW, MSW   [facility]  Care Coordination  [facility]  [phone]  11/3/2023 9:58 AM           "

## 2024-02-20 NOTE — PATIENT INSTRUCTIONS
Start acetaminophen 1000 mg every 6-8 hours. Also start diclofenac 1% gel to the left elbow 3-4 days.   Avoid putting pressure on the elbow

## 2024-02-22 RX ORDER — BISACODYL 5 MG/1
10 TABLET, DELAYED RELEASE ORAL ONCE
Qty: 2 TABLET | Refills: 0 | Status: CANCELLED | OUTPATIENT
Start: 2024-02-22 | End: 2024-02-22

## 2024-02-22 RX ORDER — BISACODYL 5 MG/1
10 TABLET, DELAYED RELEASE ORAL ONCE
Qty: 2 TABLET | Refills: 0 | Status: SHIPPED | OUTPATIENT
Start: 2024-02-22 | End: 2024-02-22

## 2024-02-22 NOTE — OR NURSING
Instructed to bring Albuterol inhlaler day of procedure, Hold NSAIDs, ASA, vitamins & supplements starting today 2/22,  Bowel Prep Rx resent to [facility] [place].

## 2024-02-26 ENCOUNTER — ANESTHESIA EVENT (OUTPATIENT)
Dept: SURGERY | Facility: HOSPITAL | Age: 69
End: 2024-02-26
Payer: MEDICARE

## 2024-02-26 ASSESSMENT — LIFESTYLE VARIABLES: TOBACCO_USE: 0

## 2024-02-26 NOTE — ANESTHESIA PREPROCEDURE EVALUATION
Anesthesia Pre-Procedure Evaluation    Patient: Jose C Corona   MRN: [id number] : 1955        Procedure : Procedure(s):  COLONOSCOPY          Past Medical History:   Diagnosis Date     Mild intermittent asthma without complication 2013    Asthma action plan:  13      Mild persistent asthma 2013      Past Surgical History:   Procedure Laterality Date     BIOPSY PROSTATE TRANSRECTAL N/A 10/08/2019    Procedure: Transrectal Ultrasound Guided Biopsy of Prostate;  Surgeon: Juliano Williamson MD;  Location: [facility] OR     CIRCUMCISION       COLONOSCOPY      normal     COLONOSCOPY  2018    2 small polyps recheck 10 years per patient     esophageal scope N/A 2021     EXCISE LESION FACE WITH FLAP PEDICLE Left 2018    Procedure: EXCISE LESION FACE WITH FLAP PEDICLE;  EXCISION LEFT NASAL BASAL CELL CARCINOMA WITH FROZENS AND FLAP REPAIR  1 x 0.6  3;  Surgeon: Yaneli Reeves MD;  Location: [facility] OR     MRI BIOPSY PROSTATE N/A 10/06/2020    Procedure: MRI guided prostate biopsy;  Surgeon: Juliano Williamson MD;  Location:  OR     ORTHOPEDIC SURGERY  2013    left knee meniscus repair      Allergies   Allergen Reactions     Food Swelling     Enola       Esophagus swelling     Seasonal Allergies Unknown     Sinus Congestion-      Shellfish-Derived Products Rash     Also gets flushed face     Wheat Germ Oil Other (See Comments)     Sinus Congestion-     Enola Meal      Shrimp      Wheat Bran       Social History     Tobacco Use     Smoking status: Never     Smokeless tobacco: Never   Substance Use Topics     Alcohol use: Yes     Comment: weekends, beer      Wt Readings from Last 1 Encounters:   24 91.4 kg (201 lb 6.4 oz)        Anesthesia Evaluation   Pt has had prior anesthetic. Type: MAC.    No history of anesthetic complications       ROS/MED HX  ENT/Pulmonary:     (+)                     Mild Persistent, asthma (PRN albuterol and BID flovent. 23 ACT = 25)  Treatment: Inhaler prn  and Inhaler daily,              (-) tobacco use   Neurologic:     (+)          CVA (Hx of frontal lobe CVA secondary to PFO. Post PFO closure now.), date: 5/25/23, without deficits, - (initial numbness right arm and right side of mouth has resolved). TIA,                  Cardiovascular: Comment: VS at 2/14/24 preop (not in Jesus's note, but is in AVS): /78, HR 58, 98% r/a  Status post patent foramen ovale closure (8/11/23) - occluder    Aortic root enlargement (HCC)  Comment: Cardiac CT demonstrates aortic root 4.3 cm. Blood pressure stable.     Last cards visit 9/27/23:   Atypical chest pain  Comment: CTA with no evidence of coronary stenosis or plaque. Coronary artery calcium score of 0  Plan: Continue to monitor symptoms. Likely related to statin therapy        (+) Dyslipidemia - -   -  - -   Taking blood thinners (aspirin and plavix) Pt has received instructions: Instructions Given to patient: stop 7-10 days prior to procedure.                            Previous cardiac testing   Echo: Date: 8/11/23 Results:  Normal LV size and function, EF 60%.  Normal RV size and function.  No pericardial effusion.  Normal IVC size and reactivity.  30 MM Amplatzer Occluder with normal seating on the interatrial septum. No evidence of residual shunt by color flow doppler.  Normal IVC size and mean right atrial pressure.   Stress Test:  Date: 7/17/23 Results:     The nuclear stress test is abnormal.      TID is present quantitatively but not visually.      The left ventricular ejection fraction at rest is 62%.  The left ventricular ejection fraction at stress is 51%.      Mild left ventricular enlargement is noted.      There is no prior study for comparison.     The end systolic volume is increased from 63ml on the rest images to 79 ml   on the stress images.  Ejection fraction falls from 62% at rest to 51% on   the stress images     ECG Reviewed:  Date: 2/13/24 Results:  HR 48, Sinus bradycardia  Increased R/S  ratio in V1, consider early transition or posterior infarct  Abnormal ECG  Per Jesus read: appears normal, NSR, with early repolarization pattern.  Cath:  Date: Results:   (-) CHF and SALCIDO   METS/Exercise Tolerance:     Hematologic: Comments: Sister with hx of blood clots.       Musculoskeletal: Comment: Neck pain  Left shoulder pain      GI/Hepatic: Comment: Esophageal dysphagia    (+)        bowel prep,            Renal/Genitourinary:  - neg Renal ROS     Endo:  - neg endo ROS     Psychiatric/Substance Use:  - neg psychiatric ROS     Infectious Disease:  - neg infectious disease ROS     Malignancy:   (+) Malignancy, History of Skin.Skin CA Remission status post Surgery.      Other: Comment: Per 2/14/24 preop: In addition Crow did report ongoing discomfort across his chest.  He has obviously undergone extensive workup due to his history of stroke and PFO.  In addition he has been following with cardiology hence there is no concern that this is of a cardiac etiology which he is aware of.  He did have his PFO closed and does have scheduled follow-up for that.  Nevertheless due to his ongoing complaint we did discuss discomfort in his neck and left shoulder.  Hence today decision was made to image these beyond the preoperative assessment.           Physical Exam    Airway  airway exam normal      Mallampati: I   TM distance: > 3 FB   Neck ROM: full   Mouth opening: > 3 cm    Respiratory Devices and Support         Dental       (+) Minor Abnormalities - some fillings, tiny chips      Cardiovascular   cardiovascular exam normal       Rhythm and rate: regular and normal     Pulmonary   pulmonary exam normal        breath sounds clear to auscultation       OUTSIDE LABS:  CBC:   Lab Results   Component Value Date    WBC 7.4 02/13/2024    WBC 5.4 11/23/2022    HGB 13.9 02/13/2024    HGB 14.5 11/23/2022    HCT 40.5 02/13/2024    HCT 42.8 11/23/2022     02/13/2024     11/23/2022     BMP:   Lab Results  "  Component Value Date     02/13/2024     11/23/2022    POTASSIUM 4.8 02/13/2024    POTASSIUM 4.6 11/23/2022    CHLORIDE 106 02/13/2024    CHLORIDE 104 11/23/2022    CO2 24 02/13/2024    CO2 23 11/23/2022    BUN 10.9 02/13/2024    BUN 14.7 11/23/2022    CR 0.81 02/13/2024    CR 0.92 11/23/2022    GLC 84 02/13/2024    GLC 89 11/23/2022     COAGS:   Lab Results   Component Value Date    INR 1.0 12/05/2013     POC: No results found for: \"BGM\", \"HCG\", \"HCGS\"  HEPATIC:   Lab Results   Component Value Date    ALBUMIN 3.9 02/13/2024    PROTTOTAL 6.9 02/13/2024    ALT 24 02/13/2024    AST 21 02/13/2024    ALKPHOS 106 02/13/2024    BILITOTAL 0.4 02/13/2024     OTHER:   Lab Results   Component Value Date    MARINA 9.1 02/13/2024       Anesthesia Plan    ASA Status:  3    NPO Status:  NPO Appropriate    Anesthesia Type: MAC.     - Reason for MAC: straight local not clinically adequate, chronic cardiopulmonary disease              Consents    Anesthesia Plan(s) and associated risks, benefits, and realistic alternatives discussed. Questions answered and patient/representative(s) expressed understanding.     - Discussed: Risks, Benefits and Alternatives for BOTH SEDATION and the PROCEDURE were discussed     - Discussed with:  Patient      - Extended Intubation/Ventilatory Support Discussed: No.      - Patient is DNR/DNI Status: No     Use of blood products discussed: No .     Postoperative Care            Comments:    Other Comments: Reviewed 2/14/24 preop from [facility]  Pre/post anesthesia orders signed/held. SHARONDA Segundo, APRN CNP    I have reviewed the pertinent notes and labs in the chart from the past 30 days and (re)examined the patient.  Any updates or changes from those notes are reflected in this note.                  "

## 2024-03-01 ENCOUNTER — ANESTHESIA (OUTPATIENT)
Dept: SURGERY | Facility: HOSPITAL | Age: 69
End: 2024-03-01
Payer: MEDICARE

## 2024-03-01 ENCOUNTER — HOSPITAL ENCOUNTER (OUTPATIENT)
Facility: HOSPITAL | Age: 69
Discharge: HOME | End: 2024-03-01
Attending: SURGERY | Admitting: SURGERY
Payer: MEDICARE

## 2024-03-01 VITALS
TEMPERATURE: 97.4 F | WEIGHT: 194 LBS | RESPIRATION RATE: 18 BRPM | HEIGHT: 72 IN | BODY MASS INDEX: 26.28 KG/M2 | OXYGEN SATURATION: 99 % | DIASTOLIC BLOOD PRESSURE: 78 MMHG | HEART RATE: 60 BPM | SYSTOLIC BLOOD PRESSURE: 140 MMHG

## 2024-03-01 PROCEDURE — 370N000017 HC ANESTHESIA TECHNICAL FEE, PER MIN: Performed by: SURGERY

## 2024-03-01 PROCEDURE — 258N000003 HC RX IP 258 OP 636: Performed by: NURSE PRACTITIONER

## 2024-03-01 PROCEDURE — 45385 COLONOSCOPY W/LESION REMOVAL: CPT | Mod: PT | Performed by: SURGERY

## 2024-03-01 PROCEDURE — 272N000001 HC OR GENERAL SUPPLY STERILE: Performed by: SURGERY

## 2024-03-01 PROCEDURE — 710N000012 HC RECOVERY PHASE 2, PER MINUTE: Performed by: SURGERY

## 2024-03-01 PROCEDURE — 45385 COLONOSCOPY W/LESION REMOVAL: CPT | Performed by: NURSE ANESTHETIST, CERTIFIED REGISTERED

## 2024-03-01 PROCEDURE — 999N000141 HC STATISTIC PRE-PROCEDURE NURSING ASSESSMENT: Performed by: SURGERY

## 2024-03-01 PROCEDURE — 250N000009 HC RX 250: Performed by: NURSE ANESTHETIST, CERTIFIED REGISTERED

## 2024-03-01 PROCEDURE — 88305 TISSUE EXAM BY PATHOLOGIST: CPT | Mod: 26 | Performed by: PATHOLOGY

## 2024-03-01 PROCEDURE — 360N000075 HC SURGERY LEVEL 2, PER MIN: Performed by: SURGERY

## 2024-03-01 PROCEDURE — 88305 TISSUE EXAM BY PATHOLOGIST: CPT | Mod: TC | Performed by: SURGERY

## 2024-03-01 PROCEDURE — 250N000011 HC RX IP 250 OP 636: Performed by: NURSE ANESTHETIST, CERTIFIED REGISTERED

## 2024-03-01 RX ORDER — HYDROMORPHONE HYDROCHLORIDE 1 MG/ML
0.4 INJECTION, SOLUTION INTRAMUSCULAR; INTRAVENOUS; SUBCUTANEOUS EVERY 5 MIN PRN
Status: DISCONTINUED | OUTPATIENT
Start: 2024-03-01 | End: 2024-03-01 | Stop reason: HOSPADM

## 2024-03-01 RX ORDER — ONDANSETRON 2 MG/ML
4 INJECTION INTRAMUSCULAR; INTRAVENOUS EVERY 30 MIN PRN
Status: DISCONTINUED | OUTPATIENT
Start: 2024-03-01 | End: 2024-03-01 | Stop reason: HOSPADM

## 2024-03-01 RX ORDER — FENTANYL CITRATE 50 UG/ML
25 INJECTION, SOLUTION INTRAMUSCULAR; INTRAVENOUS EVERY 5 MIN PRN
Status: DISCONTINUED | OUTPATIENT
Start: 2024-03-01 | End: 2024-03-01 | Stop reason: HOSPADM

## 2024-03-01 RX ORDER — FENTANYL CITRATE 50 UG/ML
50 INJECTION, SOLUTION INTRAMUSCULAR; INTRAVENOUS EVERY 5 MIN PRN
Status: DISCONTINUED | OUTPATIENT
Start: 2024-03-01 | End: 2024-03-01 | Stop reason: HOSPADM

## 2024-03-01 RX ORDER — HYDROMORPHONE HYDROCHLORIDE 1 MG/ML
0.2 INJECTION, SOLUTION INTRAMUSCULAR; INTRAVENOUS; SUBCUTANEOUS EVERY 5 MIN PRN
Status: DISCONTINUED | OUTPATIENT
Start: 2024-03-01 | End: 2024-03-01 | Stop reason: HOSPADM

## 2024-03-01 RX ORDER — PROPOFOL 10 MG/ML
INJECTION, EMULSION INTRAVENOUS PRN
Status: DISCONTINUED | OUTPATIENT
Start: 2024-03-01 | End: 2024-03-01

## 2024-03-01 RX ORDER — SODIUM CHLORIDE, SODIUM LACTATE, POTASSIUM CHLORIDE, CALCIUM CHLORIDE 600; 310; 30; 20 MG/100ML; MG/100ML; MG/100ML; MG/100ML
INJECTION, SOLUTION INTRAVENOUS CONTINUOUS
Status: DISCONTINUED | OUTPATIENT
Start: 2024-03-01 | End: 2024-03-01 | Stop reason: HOSPADM

## 2024-03-01 RX ORDER — LIDOCAINE HYDROCHLORIDE 20 MG/ML
INJECTION, SOLUTION INFILTRATION; PERINEURAL PRN
Status: DISCONTINUED | OUTPATIENT
Start: 2024-03-01 | End: 2024-03-01

## 2024-03-01 RX ORDER — LIDOCAINE 40 MG/G
CREAM TOPICAL
Status: DISCONTINUED | OUTPATIENT
Start: 2024-03-01 | End: 2024-03-01 | Stop reason: HOSPADM

## 2024-03-01 RX ORDER — ONDANSETRON 4 MG/1
4 TABLET, ORALLY DISINTEGRATING ORAL EVERY 30 MIN PRN
Status: DISCONTINUED | OUTPATIENT
Start: 2024-03-01 | End: 2024-03-01 | Stop reason: HOSPADM

## 2024-03-01 RX ORDER — NALOXONE HYDROCHLORIDE 0.4 MG/ML
0.1 INJECTION, SOLUTION INTRAMUSCULAR; INTRAVENOUS; SUBCUTANEOUS
Status: DISCONTINUED | OUTPATIENT
Start: 2024-03-01 | End: 2024-03-01 | Stop reason: HOSPADM

## 2024-03-01 RX ORDER — PROPOFOL 10 MG/ML
INJECTION, EMULSION INTRAVENOUS CONTINUOUS PRN
Status: DISCONTINUED | OUTPATIENT
Start: 2024-03-01 | End: 2024-03-01

## 2024-03-01 RX ADMIN — SODIUM CHLORIDE, POTASSIUM CHLORIDE, SODIUM LACTATE AND CALCIUM CHLORIDE: 600; 310; 30; 20 INJECTION, SOLUTION INTRAVENOUS at 07:10

## 2024-03-01 RX ADMIN — LIDOCAINE HYDROCHLORIDE 80 MG: 20 INJECTION, SOLUTION INFILTRATION; PERINEURAL at 08:07

## 2024-03-01 RX ADMIN — PROPOFOL 50 MG: 10 INJECTION, EMULSION INTRAVENOUS at 08:08

## 2024-03-01 RX ADMIN — PROPOFOL 200 MCG/KG/MIN: 10 INJECTION, EMULSION INTRAVENOUS at 08:08

## 2024-03-01 ASSESSMENT — ACTIVITIES OF DAILY LIVING (ADL)
ADLS_ACUITY_SCORE: 35

## 2024-03-01 NOTE — OP NOTE
Jose C Corona MRN# [id number]   YOB: 1955 Age: 68 year old      Date of Admission:  3/1/2024  Date of Service:   3/01/24    Primary care provider: Rosalio Lee    PREOPERATIVE DIAGNOSIS:  Colon Cancer Screening        POSTOPERATIVE DIAGNOSIS:  Colon polyps x 2          PROCEDURE:  Colonoscopy with polypectomy            INDICATIONS:  Screening colonoscopy.      Specimen:   ID Type Source Tests Collected by Time Destination   1 :  Polyp Large Intestine, Colon, Cecum SURGICAL PATHOLOGY EXAM Sebastian Nelson MD 3/1/2024  8:17 AM    2 : @ 80CM Polyp Large Intestine, Colon SURGICAL PATHOLOGY EXAM Sebastian Nelson MD 3/1/2024  8:20 AM        SURGEON: Sebastian Nelson MD    DESCRIPTION OF PROCEDURE: Jose C Corona was brought into the endoscopy suite and placed in the left lateral decubitus position. After preprocedural pause and attended monitored anesthesia was administered, the external anus was inspected and was normal. Digital rectal exam was normal. The colonoscope was inserted and advanced under direct visualization to the level of the cecum which was identified by the appendiceal orifice and the ileocecal valve. The prep was excellent.. Upon slow withdrawal of the colonoscope, approximately 95% of the mucosa was directly visualized. There was polyp in cecum and at 80 cm removed with cold snare. There was noted to be diverticulosis.  The rest of the colon was without mucosal abnormality. There was no evidence of further polyps, inflammation, bleeding or AVMs. Retroflexion of the rectum was normal. The extra air was removed from the colon, and the colonoscope withdrawn. The patient tolerated the procedure well and was taken to postanesthesia care unit.     We invite the patient to return in 5 years for follow up screening evaluation, pending pathology related to polyps.    Sebastian Nelson MD

## 2024-03-01 NOTE — ANESTHESIA CARE TRANSFER NOTE
Patient: Jose C Corona    Procedure: Procedure(s):  COLONOSCOPY WITH POLYPECTOMY       Diagnosis: Encounter for screening colonoscopy [Z12.11]  Diagnosis Additional Information: No value filed.    Anesthesia Type:   MAC     Note:    Oropharynx: spontaneously breathing  Level of Consciousness: awake  Oxygen Supplementation: room air    Independent Airway: airway patency satisfactory and stable  Dentition: dentition unchanged  Vital Signs Stable: post-procedure vital signs reviewed and stable  Report to RN Given: handoff report given  Patient transferred to: Phase II    Handoff Report: Identifed the Patient, Identified the Reponsible Provider, Reviewed the pertinent medical history, Discussed the surgical course, Reviewed Intra-OP anesthesia mangement and issues during anesthesia, Set expectations for post-procedure period and Allowed opportunity for questions and acknowledgement of understanding  Vitals:  Vitals Value Taken Time   BP     Temp     Pulse     Resp     SpO2         Electronically Signed By: YAKELIN Sharp CRNA  March 1, 2024  8:29 AM

## 2024-03-01 NOTE — DISCHARGE INSTRUCTIONS
INSTRUCTIONS AFTER COLONOSCOPY    WHEN YOU ARE BACK HOME:  Plan to rest for an hour or two after you get home.  You may have some cramping or pressure until you pass gas.  You may resume your regular medications.  Eat a small, light meal at first, and then gradually return to normal meal sizes.  You will be contacted the next day to see how you are doing.  If you had a polyp removed:  Slight bleeding may occur.  You may have a slight blood stain on the toilet paper after a bowel movement.  To lessen the chance of bleeding, avoid heavy exercise for ONE WEEK.  This includes heavy lifting, vigorous sport activities, and heavy physical labor.  You may resume your normal sexual activity.    Avoid aspirin or aspirin products if instructed by your doctor.  If there is a polyp or biopsy, you will be contacted with results within 7-10 days.     WHAT TO WATCH FOR:  Problems rarely occur after the exam; however, it is important for you to watch for early signs of possible problems.  If you have   Unusual pain in your abdomen  Nausea and vomiting that persists  Excessive bleeding  Black or bloody bowel movements  Fever or temperature above 100.6 F  Please call your doctor ([facility] [phone]) or go to the nearest hospital emergency room.    After Anesthesia (Sleep Medicine)  What should I do after anesthesia?  You should rest and relax for the next 24 hours. Avoid risky or difficult (strenuous) activity. A responsible adult should stay with you overnight.  Don't drive or use any heavy equipment for 24 hours. Even if you feel normal, your reactions may be affected by the sleep medicine given to you.  Don't drink alcohol or make any important decisions for 24 hours.  Slowly get back to your regular diet, as you feel able.  How should I expect to feel?  It's normal to feel dizzy, light-headed, or faint for up to a full day after anesthesia or while taking pain medicine. If this happens:   Sit down for a few minutes  before standing.  Have someone help you when you get up to walk or use the bathroom.  If you have nausea (feel sick to your stomach) or vomit (throw up):   Drink clear liquids (such as apple juice, ginger ale, broth, or 7UP) until you feel better.  If you feel sick to your stomach, or you keep vomiting for 24 hours, please call the doctor.  What else should I know?  You might have a dry mouth, sore throat, muscle aches, or trouble sleeping. These should go away after 24 hours.  Please contact your doctor if you have any other symptoms that concern you, such as fever, pain, bleeding, fluid drainage, swelling, or headache, or if it's been over 8 to 10 hours and you still aren't able to pee (urinate).  If you have a history of sleep apnea, it's very important to use your CPAP machine for the next 24 hours when you nap or sleep.   For informational purposes only. Not to replace the advice of your health care provider. Copyright   2023 [facility]. All rights reserved. Clinically reviewed by Mahendra Thomson MD. [employer] [id number] - REV 09/23.

## 2024-03-01 NOTE — OR NURSING
Patient and responsible adult given discharge instructions with no questions regarding instructions. Leeann score 20/20. Pain level 0/10.  Discharged from unit via ambulation. Patient discharged to home with wife.

## 2024-03-01 NOTE — ANESTHESIA POSTPROCEDURE EVALUATION
Patient: Jose C Corona    Procedure: Procedure(s):  COLONOSCOPY WITH POLYPECTOMY       Anesthesia Type:  MAC    Note:  Disposition: Outpatient   Postop Pain Control: Uneventful            Sign Out: Well controlled pain   PONV: No   Neuro/Psych: Uneventful            Sign Out: Acceptable/Baseline neuro status   Airway/Respiratory: Uneventful            Sign Out: Acceptable/Baseline resp. status   CV/Hemodynamics: Uneventful            Sign Out: Acceptable CV status; No obvious hypovolemia; No obvious fluid overload   Other NRE: NONE   DID A NON-ROUTINE EVENT OCCUR? No       Last vitals:  Vitals Value Taken Time   /71 03/01/24 0835   Temp 97.4  F (36.3  C) 03/01/24 0835   Pulse 66 03/01/24 0835   Resp 18 03/01/24 0835   SpO2 97 % 03/01/24 0835       Electronically Signed By: YAKELIN Olmstead CRNA  March 1, 2024  8:38 AM

## 2024-03-06 LAB
PATH REPORT.COMMENTS IMP SPEC: NORMAL
PATH REPORT.FINAL DX SPEC: NORMAL
PATH REPORT.GROSS SPEC: NORMAL
PATH REPORT.MICROSCOPIC SPEC OTHER STN: NORMAL
PATH REPORT.RELEVANT HX SPEC: NORMAL
PHOTO IMAGE: NORMAL

## 2024-03-12 ENCOUNTER — TRANSFERRED RECORDS (OUTPATIENT)
Dept: HEALTH INFORMATION MANAGEMENT | Facility: CLINIC | Age: 69
End: 2024-03-12

## 2024-03-12 LAB — EJECTION FRACTION: 58.9 %

## 2024-03-28 ENCOUNTER — THERAPY VISIT (OUTPATIENT)
Dept: PHYSICAL THERAPY | Facility: OTHER | Age: 69
End: 2024-03-28
Attending: INTERNAL MEDICINE
Payer: MEDICARE

## 2024-03-28 DIAGNOSIS — M25.512 LEFT SHOULDER PAIN, UNSPECIFIED CHRONICITY: ICD-10-CM

## 2024-03-28 DIAGNOSIS — M54.2 NECK PAIN: ICD-10-CM

## 2024-03-28 PROCEDURE — 97161 PT EVAL LOW COMPLEX 20 MIN: CPT | Mod: GP

## 2024-03-28 NOTE — PROGRESS NOTES
PHYSICAL THERAPY EVALUATION  Type of Visit: Evaluation    See electronic medical record for Abuse and Falls Screening details.    Subjective       Presenting condition or subjective complaint: chest and arm pain  Date of onset: 02/14/24    Relevant medical history:     Dates & types of surgery: pfo closure on august 11 2024    Prior diagnostic imaging/testing results: X-ray     Prior therapy history for the same diagnosis, illness or injury:        Prior Level of Function  Transfers: Independent  Ambulation: Independent  ADL: Independent  IADL:     Living Environment  Social support: With a significant other or spouse   Type of home: House; Multi-level   Stairs to enter the home: Yes       Ramp: No   Stairs inside the home: Yes 12 Is there a railing: Yes   Help at home: None  Equipment owned:       Employment:      Hobbies/Interests:      Patient goals for therapy: more physical activity pain free    Pain assessment:  Patient reports his present symptoms began in January 2023.  He noted pain along the anterior chest region, shoulder/upper arm, and neck.  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  He also notes having a PFO closure in August and continues to follow-up with his cardiologist.  He reports they have ruled out any cardiac reasons for his chest pain and states they feel it is more of a muscular issue.  He was seen by his primary care provider on 2/14/2024 and x-rays were taken of the cervical spine and left shoulder.  C-spine x-rays: Cervical spondylosis without acute abnormality.  (Grade 1 anterior listhesis of C4 on C5).  Left shoulder: No acute osseous abnormality .  Patient is now referred to physical therapy.  He also notes a recent diagnosis of left elbow bursitis which she has been treating but does not seem to be improving.     Objective   CERVICAL SPINE EVALUATION  PAIN: Pain Level at Rest: 0/10  Pain Level with Use: 5/10  Pain  Location: Bilateral neck especially along the cervical and upper thoracic paraspinal muscles at times radiating into the upper trapezius region his pain is greater on right than on left.  Pain Quality: Dull and Sharp  Pain Frequency: intermittent  Pain is Exacerbated By: ADLs lifting sleeping, recreational activities i.e. golf  Pain is Relieved By: none  Pain Progression: Unchanged  INTEGUMENTARY (edema, incisions):   POSTURE:  Poor posture-significant rounded shoulder forward head posture with increased thoracic kyphosis.  The right shoulder and scapular were elevated as compared to the left.  Left iliac crest was elevated as compared to the right  GAIT:   Weightbearing Status:   Assistive Device(s): None  Gait Deviations: WFL  BALANCE/PROPRIOCEPTION:   WEIGHTBEARING ALIGNMENT:   ROM:  Cervical spine active range of motion: Flexion = 1 inch chin to chest with right neck pain, back bending = 0 to 23 degrees with central/midline lower cervical pain right sidebending = 0 to 24 degrees with right neck pain, left sidebending = 0 to 14 degrees with left neck pain, right rotation was 50% from full rotation with pain noted both sides of the neck, left rotation was slightly greater than 50% for full rotation with pain noted bilaterally.  He demonstrated good upper cervical range of motion in flexion    MYOTOMES:   DTR S: WNL  CORD SIGNS: WNL  DERMATOMES: WNL  NEURAL TENSION: Cervical WNL  FLEXIBILITY: Hypomobility noted in the bilateral pectoral muscles, the upper trapezius, levator scapular muscles and interscapular muscles  SPECIAL TESTS:  Vertebral artery test, compression distraction test of the cervical spine, upper cervical precautionary test were all negative  PALPATION:  Soft tissue tension and tenderness to palpation especially along the right greater than left upper trapezius, levator scapula, scalene, and interscapular muscles  SPINAL SEGMENTAL CONCLUSIONS:  ERS left T4 ,ERS right T7, FRS left T2    SHOULDER  EVALUATION  PAIN: Pain Level at Rest: 1/10  Pain Level with Use: 7/10  Pain Location: Patient reports pain in the left greater than right upper arm region along the anterior lateral region.  He also notes shooting pain across his chest anteriorly.  He is especially notes these pains when lying down or when first  Pain Quality: Sharp  Pain Frequency: intermittent  Pain is Exacerbated By: Sleeping-especially notes pain when lying down in supine or side-lying position reaching motions above shoulder height or behind back touching the back of neck motions  Pain is Relieved By: none  Pain Progression: Unchanged  INTEGUMENTARY (edema, incisions):   POSTURE:  See above under cervical spine  GAIT:   Weightbearing Status:   Assistive Device(s): None  Gait Deviations: WFL  BALANCE/PROPRIOCEPTION:   WEIGHTBEARING ALIGNMENT:   ROM: Left shoulder active range of motion: Flexion left = 0 to 130 degrees, right = 0 to 135 degrees patient noting pain along the posterior upper arm.    STRENGTH:  Not assessed today  FLEXIBILITY:   SPECIAL TESTS:  Positive Amaro Jeison test on left positive crossover impingement test bilaterally  PALPATION:  Tenderness to palpation along the left more so than right anterior shoulder/rotator cuff insertion site, AC joint, and suprascapular region.  Soft tissue tension tenderness to palpation also along the left upper arm/deltoid muscle  JOINT MOBILITY:   CERVICAL SCREEN:     Assessment & Plan   CLINICAL IMPRESSIONS  Medical Diagnosis: Neck pain, left shoulder pain, unspecified chronicity    Treatment Diagnosis: Neck pain, left shoulder pain, unspecified chronicity   Impression/Assessment: Patient is a 68 year old male with neck shoulder, upper arm, anterior chest pain complaints.  The following significant findings have been identified: Pain, Decreased ROM/flexibility, Decreased joint mobility, Decreased strength, Impaired muscle performance, Decreased activity tolerance, and Impaired posture. These  impairments interfere with their ability to perform self care tasks, work tasks, recreational activities, and household chores as compared to previous level of function.     Clinical Decision Making (Complexity):  Clinical Presentation: Stable/Uncomplicated  Clinical Presentation Rationale: based on medical and personal factors listed in PT evaluation  Clinical Decision Making (Complexity): Low complexity    PLAN OF CARE  Treatment Interventions:  Modalities: Cryotherapy, E-stim, Ultrasound  Interventions: Manual Therapy, Neuromuscular Re-education, Therapeutic Activity, Therapeutic Exercise, Self-Care/Home Management    Long Term Goals     PT Goal 1  Goal Identifier: STG 1  Goal Description: Decrease shoulder pain when at its worst to a 6/10, decrease neck pain when at its worst to 4/10  Target Date: 04/11/24  PT Goal 2  Goal Identifier: LTG 1  Goal Description: Patient will demonstrate independence with home exercise program  Target Date: 06/20/24  PT Goal 3  Goal Identifier: LTG 2  Goal Description: Patient able to reach up onto high shelf with pain level 2 or less/10  Target Date: 06/20/24  PT Goal 4  Goal Identifier: LTG 3  Goal Description: Patient will be able to sleep without disruption due to pain of less than 1 hour per night 75% of the time  Target Date: 06/20/24      Frequency of Treatment: 2 times/week  Duration of Treatment: Up to 12 weeks    Recommended Referrals to Other Professionals: Physical Therapy  Education Assessment:   Learner/Method: Patient;Listening;No Barriers to Learning  Education Comments: Patient education/discussion in today's objective findings along with recommended treatment plan    Risks and benefits of evaluation/treatment have been explained.   Patient/Family/caregiver agrees with Plan of Care.     Evaluation Time:     PT Eval, Low Complexity Minutes (45534): 45       Signing Clinician: Sandra Huerta PT      [facility]                                                                                    OUTPATIENT PHYSICAL THERAPY      PLAN OF TREATMENT FOR OUTPATIENT REHABILITATION   Patient's Last Name, First Name, Jose C Cleveland YOB: 1955   Provider's Name   [facility]   Medical Record No.  [id number]     Onset Date: 02/14/24  Start of Care Date: 03/28/24     Medical Diagnosis:  Neck pain, left shoulder pain, unspecified chronicity      PT Treatment Diagnosis:  Neck pain, left shoulder pain, unspecified chronicity Plan of Treatment  Frequency/Duration: 2 times/week/ Up to 12 weeks    Certification date from 03/28/24 to 06/20/24         See note for plan of treatment details and functional goals     Sandra Huerta, PT                         I CERTIFY THE NEED FOR THESE SERVICES FURNISHED UNDER        THIS PLAN OF TREATMENT AND WHILE UNDER MY CARE     (Physician attestation of this document indicates review and certification of the therapy plan).              Referring Provider:  Rosalio Lee    Initial Assessment  See Epic Evaluation- Start of Care Date: 03/28/24

## 2024-04-02 ENCOUNTER — THERAPY VISIT (OUTPATIENT)
Dept: PHYSICAL THERAPY | Facility: OTHER | Age: 69
End: 2024-04-02
Attending: INTERNAL MEDICINE
Payer: MEDICARE

## 2024-04-02 DIAGNOSIS — M25.512 LEFT SHOULDER PAIN, UNSPECIFIED CHRONICITY: ICD-10-CM

## 2024-04-02 DIAGNOSIS — M54.2 NECK PAIN: Primary | ICD-10-CM

## 2024-04-02 PROCEDURE — 97035 APP MDLTY 1+ULTRASOUND EA 15: CPT | Mod: GP

## 2024-04-02 PROCEDURE — 97140 MANUAL THERAPY 1/> REGIONS: CPT | Mod: GP

## 2024-04-04 ENCOUNTER — THERAPY VISIT (OUTPATIENT)
Dept: PHYSICAL THERAPY | Facility: OTHER | Age: 69
End: 2024-04-04
Payer: MEDICARE

## 2024-04-04 DIAGNOSIS — M25.512 LEFT SHOULDER PAIN, UNSPECIFIED CHRONICITY: ICD-10-CM

## 2024-04-04 DIAGNOSIS — M54.2 NECK PAIN: Primary | ICD-10-CM

## 2024-04-04 PROCEDURE — 97035 APP MDLTY 1+ULTRASOUND EA 15: CPT | Mod: GP

## 2024-04-04 PROCEDURE — 97140 MANUAL THERAPY 1/> REGIONS: CPT | Mod: GP

## 2024-04-09 ENCOUNTER — THERAPY VISIT (OUTPATIENT)
Dept: PHYSICAL THERAPY | Facility: OTHER | Age: 69
End: 2024-04-09
Attending: INTERNAL MEDICINE
Payer: MEDICARE

## 2024-04-09 DIAGNOSIS — M54.2 NECK PAIN: Primary | ICD-10-CM

## 2024-04-09 DIAGNOSIS — M25.512 LEFT SHOULDER PAIN, UNSPECIFIED CHRONICITY: ICD-10-CM

## 2024-04-09 PROCEDURE — 97035 APP MDLTY 1+ULTRASOUND EA 15: CPT | Mod: GP

## 2024-04-09 PROCEDURE — 97140 MANUAL THERAPY 1/> REGIONS: CPT | Mod: GP

## 2024-04-11 ENCOUNTER — THERAPY VISIT (OUTPATIENT)
Dept: PHYSICAL THERAPY | Facility: OTHER | Age: 69
End: 2024-04-11
Attending: INTERNAL MEDICINE
Payer: MEDICARE

## 2024-04-11 DIAGNOSIS — M25.512 LEFT SHOULDER PAIN, UNSPECIFIED CHRONICITY: ICD-10-CM

## 2024-04-11 DIAGNOSIS — M54.2 NECK PAIN: Primary | ICD-10-CM

## 2024-04-11 PROCEDURE — 97140 MANUAL THERAPY 1/> REGIONS: CPT | Mod: GP

## 2024-04-11 PROCEDURE — 97035 APP MDLTY 1+ULTRASOUND EA 15: CPT | Mod: GP

## 2024-04-16 ENCOUNTER — THERAPY VISIT (OUTPATIENT)
Dept: PHYSICAL THERAPY | Facility: OTHER | Age: 69
End: 2024-04-16
Attending: INTERNAL MEDICINE
Payer: MEDICARE

## 2024-04-16 DIAGNOSIS — M25.512 LEFT SHOULDER PAIN, UNSPECIFIED CHRONICITY: ICD-10-CM

## 2024-04-16 DIAGNOSIS — M54.2 NECK PAIN: Primary | ICD-10-CM

## 2024-04-16 PROCEDURE — 97035 APP MDLTY 1+ULTRASOUND EA 15: CPT | Mod: GP

## 2024-04-16 PROCEDURE — 97140 MANUAL THERAPY 1/> REGIONS: CPT | Mod: GP

## 2024-04-18 ENCOUNTER — THERAPY VISIT (OUTPATIENT)
Dept: PHYSICAL THERAPY | Facility: OTHER | Age: 69
End: 2024-04-18
Attending: INTERNAL MEDICINE
Payer: MEDICARE

## 2024-04-18 DIAGNOSIS — M25.512 LEFT SHOULDER PAIN, UNSPECIFIED CHRONICITY: ICD-10-CM

## 2024-04-18 DIAGNOSIS — M54.2 NECK PAIN: Primary | ICD-10-CM

## 2024-04-18 PROCEDURE — 97140 MANUAL THERAPY 1/> REGIONS: CPT | Mod: GP

## 2024-04-18 PROCEDURE — 97035 APP MDLTY 1+ULTRASOUND EA 15: CPT | Mod: GP

## 2024-04-23 ENCOUNTER — THERAPY VISIT (OUTPATIENT)
Dept: PHYSICAL THERAPY | Facility: OTHER | Age: 69
End: 2024-04-23
Attending: INTERNAL MEDICINE
Payer: MEDICARE

## 2024-04-23 DIAGNOSIS — M54.2 NECK PAIN: Primary | ICD-10-CM

## 2024-04-23 DIAGNOSIS — M25.512 LEFT SHOULDER PAIN, UNSPECIFIED CHRONICITY: ICD-10-CM

## 2024-04-23 PROCEDURE — 97110 THERAPEUTIC EXERCISES: CPT | Mod: GP

## 2024-04-23 PROCEDURE — 97140 MANUAL THERAPY 1/> REGIONS: CPT | Mod: GP

## 2024-04-23 PROCEDURE — 97035 APP MDLTY 1+ULTRASOUND EA 15: CPT | Mod: GP

## 2024-05-02 NOTE — PROGRESS NOTES
"    DISCHARGE  Reason for Discharge: Patient has met all goals.    Equipment Issued: none    Discharge Plan: Patient to continue home program.    Referring Provider:  Dr. Lee   04/23/24 0823   Appointment Info   Signing clinician's name / credentials Sandra Huerta, PT   Visits Used -10 Medicare note needed   Medical Diagnosis Neck pain, left shoulder pain, unspecified chronicity   PT Tx Diagnosis Neck pain, left shoulder pain, unspecified chronicity   Precautions/Limitations Anterior listhesis of C4 on C5   Quick Adds Certification   Progress Note/Certification   Start of Care Date 03/28/24   Onset of illness/injury or Date of Surgery 02/14/24   Therapy Frequency 2 times/week   Predicted Duration Up to 12 weeks   Certification date from 03/28/24   Certification date to 06/20/24   Progress Note Completed Date 03/28/24   PT Goal 1   Goal Identifier STG 1   Goal Description Decrease shoulder pain when at its worst to a 6/10, decrease neck pain when at its worst to 4/10   Target Date 04/11/24   Date Met 04/11/24   PT Goal 2   Goal Identifier LTG 1   Goal Description Patient will demonstrate independence with home exercise program   Target Date 06/20/24   Date Met 04/23/24   PT Goal 3   Goal Identifier LTG 2   Goal Description Patient able to reach up onto high shelf with pain level 2 or less/10   Target Date 06/20/24   Date Met 05/02/24   Subjective Report   Subjective Report Patient reports overall since beginning PT he has noted especially improvement in his \"flexibility \"and mild improvements in the shoulder/chest pain   Objective Measures   Objective Measures Objective Measure 2   Objective Measure 1   Objective Measure R shoulder AROM   Details limited elevation into flexion and abd ( 0-150) with pull along the lat and tricep mms and mild superior shoulder discomfort.   Objective Measure 2   Objective Measure Palpation   Details Mild tenderness along the right rotator cuff tendon also along the right mid " deltoid muscle   Ultrasound   Ultrasound Minutes (58680) 8   Ultrasound -Type (does not include 3-5 min prep/cleanup time) Continuous   Intensity 1.4w/cm2   Duration (does not include the 3-5 min set up/clean up time) 8 min   Frequency 1 MHz   Location R shoulder /upper arm   Positioning sidelying   Patient Response/Progress tolerated well   Therapeutic Procedure/Exercise   Therapeutic Procedures: strength, endurance, ROM, flexibility minutes (53692) 10   Ther Proc 1 HEP   Ther Proc 1 - Details Review of home exercise program and instruction/education and how to progress with these independently.  Added today: Access Code: [email or web address]  URL: [email or web address]  Date: 04/23/2024  Prepared by: Sandra Huerta    Exercises  - Wall Push Up with Plus  - 1 x daily - 3-4 x weekly - 1 sets - 10-30 reps   Skilled Intervention verbal and manual cues, pt ed   Patient Response/Progress able to perform correctly   Manual Therapy   Manual Therapy: Mobilization, MFR, MLD, friction massage minutes (06461) 20   Manual Therapy 1 STM   Manual Therapy 1 - Details Right shoulder upper arm   Manual Therapy 2 Friction massage   Manual Therapy 2 - Details R RCT   Manual Therapy 3 Jt mobs   Patient Response/Progress less tightness   Plan   Home program see above   Plan for next session Patient will be gone on vacation.  At this time he will continue with his home exercise program progressing as instructed.  He will follow-up with his primary care provider if symptoms do not continue to improve or certainly if they worsen.  We did talk about possibly further diagnostic testing to the right shoulder   Total Session Time   Timed Code Treatment Minutes 38   Total Treatment Time (sum of timed and untimed services) 38

## 2024-05-09 ENCOUNTER — TELEPHONE (OUTPATIENT)
Dept: INTERNAL MEDICINE | Facility: OTHER | Age: 69
End: 2024-05-09

## 2024-05-10 SDOH — HEALTH STABILITY: PHYSICAL HEALTH: ON AVERAGE, HOW MANY DAYS PER WEEK DO YOU ENGAGE IN MODERATE TO STRENUOUS EXERCISE (LIKE A BRISK WALK)?: 4 DAYS

## 2024-05-10 SDOH — HEALTH STABILITY: PHYSICAL HEALTH: ON AVERAGE, HOW MANY MINUTES DO YOU ENGAGE IN EXERCISE AT THIS LEVEL?: 90 MIN

## 2024-05-10 ASSESSMENT — SOCIAL DETERMINANTS OF HEALTH (SDOH): HOW OFTEN DO YOU GET TOGETHER WITH FRIENDS OR RELATIVES?: ONCE A WEEK

## 2024-05-15 ENCOUNTER — OFFICE VISIT (OUTPATIENT)
Dept: INTERNAL MEDICINE | Facility: OTHER | Age: 69
End: 2024-05-15
Attending: INTERNAL MEDICINE
Payer: COMMERCIAL

## 2024-05-15 VITALS
WEIGHT: 201.1 LBS | RESPIRATION RATE: 18 BRPM | BODY MASS INDEX: 27.27 KG/M2 | DIASTOLIC BLOOD PRESSURE: 76 MMHG | OXYGEN SATURATION: 98 % | TEMPERATURE: 97.6 F | SYSTOLIC BLOOD PRESSURE: 132 MMHG | HEART RATE: 58 BPM

## 2024-05-15 DIAGNOSIS — R97.20 ELEVATED PROSTATE SPECIFIC ANTIGEN (PSA): Primary | ICD-10-CM

## 2024-05-15 DIAGNOSIS — Z00.00 ROUTINE HISTORY AND PHYSICAL EXAMINATION OF ADULT: ICD-10-CM

## 2024-05-15 LAB
ALBUMIN SERPL BCG-MCNC: 4.3 G/DL (ref 3.5–5.2)
ALP SERPL-CCNC: 120 U/L (ref 40–150)
ALT SERPL W P-5'-P-CCNC: 40 U/L (ref 0–70)
ANION GAP SERPL CALCULATED.3IONS-SCNC: 10 MMOL/L (ref 7–15)
AST SERPL W P-5'-P-CCNC: 31 U/L (ref 0–45)
BASOPHILS # BLD AUTO: 0.1 10E3/UL (ref 0–0.2)
BASOPHILS NFR BLD AUTO: 1 %
BILIRUB SERPL-MCNC: 0.3 MG/DL
BUN SERPL-MCNC: 11.6 MG/DL (ref 8–23)
CALCIUM SERPL-MCNC: 9.6 MG/DL (ref 8.8–10.2)
CHLORIDE SERPL-SCNC: 101 MMOL/L (ref 98–107)
CHOLEST SERPL-MCNC: 174 MG/DL
CREAT SERPL-MCNC: 0.88 MG/DL (ref 0.67–1.17)
DEPRECATED HCO3 PLAS-SCNC: 26 MMOL/L (ref 22–29)
EGFRCR SERPLBLD CKD-EPI 2021: >90 ML/MIN/1.73M2
EOSINOPHIL # BLD AUTO: 0.5 10E3/UL (ref 0–0.7)
EOSINOPHIL NFR BLD AUTO: 7 %
ERYTHROCYTE [DISTWIDTH] IN BLOOD BY AUTOMATED COUNT: 12.9 % (ref 10–15)
FASTING STATUS PATIENT QL REPORTED: YES
FASTING STATUS PATIENT QL REPORTED: YES
GLUCOSE SERPL-MCNC: 94 MG/DL (ref 70–99)
HCT VFR BLD AUTO: 43.5 % (ref 40–53)
HDLC SERPL-MCNC: 76 MG/DL
HGB BLD-MCNC: 14.5 G/DL (ref 13.3–17.7)
IMM GRANULOCYTES # BLD: 0 10E3/UL
IMM GRANULOCYTES NFR BLD: 0 %
LDLC SERPL CALC-MCNC: 86 MG/DL
LYMPHOCYTES # BLD AUTO: 2.2 10E3/UL (ref 0.8–5.3)
LYMPHOCYTES NFR BLD AUTO: 30 %
MCH RBC QN AUTO: 30.4 PG (ref 26.5–33)
MCHC RBC AUTO-ENTMCNC: 33.3 G/DL (ref 31.5–36.5)
MCV RBC AUTO: 91 FL (ref 78–100)
MONOCYTES # BLD AUTO: 0.5 10E3/UL (ref 0–1.3)
MONOCYTES NFR BLD AUTO: 7 %
NEUTROPHILS # BLD AUTO: 4 10E3/UL (ref 1.6–8.3)
NEUTROPHILS NFR BLD AUTO: 55 %
NONHDLC SERPL-MCNC: 98 MG/DL
PLATELET # BLD AUTO: 325 10E3/UL (ref 150–450)
POTASSIUM SERPL-SCNC: 4.7 MMOL/L (ref 3.4–5.3)
PROT SERPL-MCNC: 7.5 G/DL (ref 6.4–8.3)
PSA SERPL DL<=0.01 NG/ML-MCNC: 5.44 NG/ML (ref 0–4.5)
RBC # BLD AUTO: 4.77 10E6/UL (ref 4.4–5.9)
SODIUM SERPL-SCNC: 137 MMOL/L (ref 135–145)
TRIGL SERPL-MCNC: 60 MG/DL
WBC # BLD AUTO: 7.3 10E3/UL (ref 4–11)

## 2024-05-15 PROCEDURE — G0463 HOSPITAL OUTPT CLINIC VISIT: HCPCS

## 2024-05-15 PROCEDURE — 80053 COMPREHEN METABOLIC PANEL: CPT | Mod: ZL | Performed by: INTERNAL MEDICINE

## 2024-05-15 PROCEDURE — 83718 ASSAY OF LIPOPROTEIN: CPT | Mod: ZL | Performed by: INTERNAL MEDICINE

## 2024-05-15 PROCEDURE — 85048 AUTOMATED LEUKOCYTE COUNT: CPT | Mod: ZL | Performed by: INTERNAL MEDICINE

## 2024-05-15 PROCEDURE — G0439 PPPS, SUBSEQ VISIT: HCPCS | Performed by: INTERNAL MEDICINE

## 2024-05-15 PROCEDURE — 82465 ASSAY BLD/SERUM CHOLESTEROL: CPT | Mod: ZL | Performed by: INTERNAL MEDICINE

## 2024-05-15 PROCEDURE — 84153 ASSAY OF PSA TOTAL: CPT | Mod: ZL | Performed by: INTERNAL MEDICINE

## 2024-05-15 PROCEDURE — 36415 COLL VENOUS BLD VENIPUNCTURE: CPT | Mod: ZL | Performed by: INTERNAL MEDICINE

## 2024-05-15 ASSESSMENT — ASTHMA QUESTIONNAIRES
QUESTION_4 LAST FOUR WEEKS HOW OFTEN HAVE YOU USED YOUR RESCUE INHALER OR NEBULIZER MEDICATION (SUCH AS ALBUTEROL): NOT AT ALL
QUESTION_2 LAST FOUR WEEKS HOW OFTEN HAVE YOU HAD SHORTNESS OF BREATH: NOT AT ALL
ACT_TOTALSCORE: 25
QUESTION_5 LAST FOUR WEEKS HOW WOULD YOU RATE YOUR ASTHMA CONTROL: COMPLETELY CONTROLLED
ACT_TOTALSCORE: 25
QUESTION_1 LAST FOUR WEEKS HOW MUCH OF THE TIME DID YOUR ASTHMA KEEP YOU FROM GETTING AS MUCH DONE AT WORK, SCHOOL OR AT HOME: NONE OF THE TIME
QUESTION_3 LAST FOUR WEEKS HOW OFTEN DID YOUR ASTHMA SYMPTOMS (WHEEZING, COUGHING, SHORTNESS OF BREATH, CHEST TIGHTNESS OR PAIN) WAKE YOU UP AT NIGHT OR EARLIER THAN USUAL IN THE MORNING: NOT AT ALL

## 2024-05-15 ASSESSMENT — PAIN SCALES - GENERAL: PAINLEVEL: NO PAIN (1)

## 2024-05-15 NOTE — PROGRESS NOTES
Preventive Care Visit  [facility] BERNARDO PATELQian Lee DO, Internal Medicine  May 15, 2024      Assessment & Plan   Problem List Items Addressed This Visit    None  Visit Diagnoses       Elevated prostate specific antigen (PSA)    -  Primary    Relevant Orders    PSA Diagnostic    Routine history and physical examination of adult        Relevant Orders    Comprehensive metabolic panel (BMP + Alb, Alk Phos, ALT, AST, Total. Bili, TP)    CBC with platelets and differential (Completed)    Lipid Profile (Chol, Trig, HDL, LDL calc)             Patient has been advised of split billing requirements and indicates understanding: Yes       BMI  Estimated body mass index is 27.27 kg/m  as calculated from the following:    Height as of 3/1/24: 1.829 m (6').    Weight as of this encounter: 91.2 kg (201 lb 1.6 oz).       Counseling  Appropriate preventive services were discussed with this patient, including applicable screening as appropriate for fall prevention, nutrition, physical activity, Tobacco-use cessation, weight loss and cognition.  Checklist reviewing preventive services available has been given to the patient.  Reviewed patient's diet, addressing concerns and/or questions.   Discussed possible causes of fatigue.       No follow-ups on file.      Subjective   Jose C is a 69 year old, presenting for the following:  Physical              HPI    Jose C is a 69 year old male with history of CVA secondary to PFO, s/p closure, Aortic root enlargement and elevated PSA presents today for routine physical.  He has seen PT for his ongoing chest and back pain which have improved some.  Etiology is unclear but seems muscle skeletal.  He is fasting today.        5/10/2024   General Health   How would you rate your overall physical health? Good   Feel stress (tense, anxious, or unable to sleep) Not at all         5/10/2024   Nutrition   Diet: Regular (no restrictions)         5/10/2024   Exercise   Days per week of  moderate/strenous exercise 4 days   Average minutes spent exercising at this level 90 min         5/10/2024   Social Factors   Frequency of gathering with friends or relatives Once a week   Worry food won't last until get money to buy more No   Food not last or not have enough money for food? No   Do you have housing?  Yes   Are you worried about losing your housing? No   Lack of transportation? No   Unable to get utilities (heat,electricity)? No         5/10/2024   Fall Risk   Fallen 2 or more times in the past year? No   Trouble with walking or balance? No          5/10/2024   Activities of Daily Living- Home Safety   Needs help with the following daily activites None of the above   Safety concerns in the home None of the above         5/10/2024   Dental   Dentist two times every year? Yes         5/10/2024   Hearing Screening   Hearing concerns? None of the above         5/10/2024   Driving Risk Screening   Patient/family members have concerns about driving No         5/10/2024   General Alertness/Fatigue Screening   Have you been more tired than usual lately? (!) YES         5/10/2024   Urinary Incontinence Screening   Bothered by leaking urine in past 6 months No         5/10/2024   TB Screening   Were you born outside of the [place]? No           Today's PHQ-2 Score:       2/13/2024     8:23 AM   PHQ-2 ( 1999 [employer])   Q1: Little interest or pleasure in doing things 0   Q2: Feeling down, depressed or hopeless 0   PHQ-2 Score 0         5/10/2024   Substance Use   Alcohol more than 3/day or more than 7/wk No   Do you have a current opioid prescription? No   How severe/bad is pain from 1 to 10? 4/10   Do you use any other substances recreationally? No     Social History     Tobacco Use    Smoking status: Never    Smokeless tobacco: Never   Vaping Use    Vaping status: Never Used   Substance Use Topics    Alcohol use: Yes     Comment: weekends, beer    Drug use: No           5/10/2024   AAA Screening   Family history  of Abdominal Aortic Aneurysm (AAA)? No   Last PSA:   PSA   Date Value Ref Range Status   2020 5.23 (H) 0 - 4 ug/L Final     Comment:     Assay Method:  Chemiluminescence using Siemens Vista analyzer     Prostate Specific Antigen Screen   Date Value Ref Range Status   2021 6.44 (H) 0.00 - 4.00 ug/L Final     PSA Tumor Marker   Date Value Ref Range Status   2022 4.65 (H) 0.00 - 4.50 ng/mL Final   2022 6.42 (H) 0.00 - 4.00 ug/L Final     ASCVD Risk   The ASCVD Risk score (Donna GARCIA, et al., 2019) failed to calculate for the following reasons:    The patient has a prior MI or stroke diagnosis    Fracture Risk Assessment Tool  Link to Frax Calculator  Use the information below to complete the Frax calculator  : 1955  Sex: male  Weight (kg): 91.2 kg (actual weight)  Height (cm): 0 cm  Previous Fragility Fracture:  No  History of parent with fractured hip:  No  Current Smoking:  No  Patient has been on glucocorticoids for more than 3 months (5mg/day or more): No  Rheumatoid Arthritis on Problem List:  No  Secondary Osteoporosis on Problem List:  No  Consumes 3 or more units of alcohol per day: No  Femoral Neck BMD (g/cm2)            Reviewed and updated as needed this visit by Provider                    Past Medical History:   Diagnosis Date    Arthritis     Cerebral infarction (H) 25    Mild intermittent asthma without complication 2013    Asthma action plan:  13     Mild persistent asthma 2013     Past Surgical History:   Procedure Laterality Date    BIOPSY PROSTATE TRANSRECTAL N/A 10/08/2019    Procedure: Transrectal Ultrasound Guided Biopsy of Prostate;  Surgeon: Juliano Williamson MD;  Location: [facility] OR    CIRCUMCISION      COLONOSCOPY      normal    COLONOSCOPY  2018    2 small polyps recheck 10 years per patient    COLONOSCOPY N/A 3/1/2024    Procedure: COLONOSCOPY WITH POLYPECTOMY;  Surgeon: Sebastian Nelson MD;  Location: [facility] OR    esophageal scope  N/A 11/2021    EXCISE LESION FACE WITH FLAP PEDICLE Left 08/22/2018    Procedure: EXCISE LESION FACE WITH FLAP PEDICLE;  EXCISION LEFT NASAL BASAL CELL CARCINOMA WITH FROZENS AND FLAP REPAIR  1 x 0.6  3;  Surgeon: Yaneli Reeves MD;  Location: [facility] OR    MRI BIOPSY PROSTATE N/A 10/06/2020    Procedure: MRI guided prostate biopsy;  Surgeon: Juliano Williamson MD;  Location:  OR    ORTHOPEDIC SURGERY  12/2013    left knee meniscus repair     Labs reviewed in EPIC  BP Readings from Last 3 Encounters:   05/15/24 132/76   03/01/24 140/78   02/20/24 130/80    Wt Readings from Last 3 Encounters:   05/15/24 91.2 kg (201 lb 1.6 oz)   03/01/24 88 kg (194 lb)   02/20/24 91.4 kg (201 lb 6.4 oz)                  Patient Active Problem List   Diagnosis    Mild intermittent asthma without complication    ACP (advance care planning)    Elevated PSA    CVA (cerebral vascular accident) (H)    Status post patent foramen ovale closure    Neck pain    Left shoulder pain     Past Surgical History:   Procedure Laterality Date    BIOPSY PROSTATE TRANSRECTAL N/A 10/08/2019    Procedure: Transrectal Ultrasound Guided Biopsy of Prostate;  Surgeon: Juliano Williamson MD;  Location:  OR    CIRCUMCISION      COLONOSCOPY  2007    normal    COLONOSCOPY  06/2018    2 small polyps recheck 10 years per patient    COLONOSCOPY N/A 3/1/2024    Procedure: COLONOSCOPY WITH POLYPECTOMY;  Surgeon: Sebastian Nelson MD;  Location: [facility] OR    esophageal scope N/A 11/2021    EXCISE LESION FACE WITH FLAP PEDICLE Left 08/22/2018    Procedure: EXCISE LESION FACE WITH FLAP PEDICLE;  EXCISION LEFT NASAL BASAL CELL CARCINOMA WITH FROZENS AND FLAP REPAIR  1 x 0.6  3;  Surgeon: Yaneli Reeves MD;  Location: [facility]    MRI BIOPSY PROSTATE N/A 10/06/2020    Procedure: MRI guided prostate biopsy;  Surgeon: Juliano Williamson MD;  Location:  OR    ORTHOPEDIC SURGERY  12/2013    left knee meniscus repair       Social History     Tobacco Use    Smoking status: Never     Smokeless tobacco: Never   Substance Use Topics    Alcohol use: Yes     Comment: weekends, beer     Family History   Problem Relation Age of Onset    Cancer Mother 54        lung    Heart Disease Father 75        MI    Other Cancer Brother 65        PASSED FROM PROSTATE CANCER    Genitourinary Problems Brother         enlarged prostate    Prostate Cancer Brother     Cerebrovascular Disease Sister          Current Outpatient Medications   Medication Sig Dispense Refill    albuterol (PROAIR HFA/PROVENTIL HFA/VENTOLIN HFA) 108 (90 Base) MCG/ACT inhaler Inhale 2 puffs into the lungs every 6 hours 18 g 11    aspirin 81 MG EC tablet Take 81 mg by mouth daily      fluticasone (FLOVENT HFA) 110 MCG/ACT inhaler INHALE 1 PUFF INTO THE LUNGS TWICE DAILY 12 g 3     Allergies   Allergen Reactions    Food Swelling     [place]       Esophagus swelling    Seasonal Allergies Unknown     Sinus Congestion-     Shellfish-Derived Products Rash     Also gets flushed face    Wheat Germ Oil Other (See Comments)     Sinus Congestion-    [place] Meal     Shrimp     Wheat      Current providers sharing in care for this patient include:  Patient Care Team:  Rosalio Lee DO as PCP - General (Internal Medicine)  Rosalio Lee DO as Assigned PCP    The following health maintenance items are reviewed in Epic and correct as of today:  Health Maintenance   Topic Date Due    MEDICARE ANNUAL WELLNESS VISIT  05/05/2022    COVID-19 Vaccine (7 - 2023-24 season) 03/06/2024    ASTHMA ACTION PLAN  08/16/2024    ASTHMA CONTROL TEST  11/15/2024    FALL RISK ASSESSMENT  05/15/2025    ADVANCE CARE PLANNING  08/31/2026    GLUCOSE  02/13/2027    LIPID  11/23/2027    COLORECTAL CANCER SCREENING  03/01/2029    DTAP/TDAP/TD IMMUNIZATION (3 - Td or Tdap) 08/06/2030    HEPATITIS C SCREENING  Completed    PHQ-2 (once per calendar year)  Completed    INFLUENZA VACCINE  Completed    Pneumococcal Vaccine: 65+ Years  Completed    ZOSTER IMMUNIZATION   Completed    RSV VACCINE (Pregnancy & 60+)  Completed    IPV IMMUNIZATION  Aged Out    HPV IMMUNIZATION  Aged Out    MENINGITIS IMMUNIZATION  Aged Out    RSV MONOCLONAL ANTIBODY  Aged Out         Review of Systems  Constitutional, HEENT, cardiovascular, pulmonary, gi and gu systems are negative, except as otherwise noted.     Objective    Exam  BP (!) 147/75 (BP Location: Right arm, Patient Position: Sitting, Cuff Size: Adult Large)   Pulse 58   Temp 97.6  F (36.4  C) (Tympanic)   Resp 18   Wt 91.2 kg (201 lb 1.6 oz)   SpO2 98%   BMI 27.27 kg/m     Estimated body mass index is 27.27 kg/m  as calculated from the following:    Height as of 3/1/24: 1.829 m (6').    Weight as of this encounter: 91.2 kg (201 lb 1.6 oz).    Physical Exam  GENERAL: alert and no distress  EYES: Eyes grossly normal to inspection, PERRL and conjunctivae and sclerae normal  HENT: ear canals and TM's normal, nose and mouth without ulcers or lesions  NECK: No bruits.    RESP: lungs clear to auscultation - no rales, rhonchi or wheezes  CV: regular rate and rhythm, normal S1 S2, no S3 or S4, no murmur, click or rub, no peripheral edema  ABDOMEN: soft, nontender, no hepatosplenomegaly, no masses and bowel sounds normal  MS: no gross musculoskeletal defects noted, no edema  SKIN: no suspicious lesions or rashes  NEURO: Normal strength and tone, mentation intact and speech normal  PSYCH: mentation appears normal, affect normal/bright        5/15/2024   Mini Cog   Clock Draw Score 2 Normal   3 Item Recall 2 objects recalled   Mini Cog Total Score 4              Signed Electronically by: Rosalio Lee DO

## 2024-09-03 ENCOUNTER — TRANSFERRED RECORDS (OUTPATIENT)
Dept: HEALTH INFORMATION MANAGEMENT | Facility: CLINIC | Age: 69
End: 2024-09-03

## 2024-09-03 LAB — EJECTION FRACTION: 59 %

## 2024-10-16 NOTE — LETTER
"    8/31/2018         RE: Jose C Corona  [address] [facility] [address]        Dear Colleague,    Thank you for referring your patient, Jose C Corona, to the [facility] [place]. Please see a copy of my visit note below.    Chief Complaint   Patient presents with     Surgical Followup     s/p excision superior left nasal dorsum BCC with flap excision left inferior lesion-8/22/18-- one stitch in flap remaining      Patient returns for repeat exam. He is POD#9. No concerns.  Denies pain.  He has no drainage or bleeding. No fevers. Here for suture removal.     Pre-op Diagnosis: Nasal basal cell carcinoma , left nasal skin lesion  Post-op Diagnosis:  same  Procedures:  1.  excision superior left nasal dorsum basal cell carcinoma with defect measuring 1.8 x 1.4 cm (2.52 cm2) and closure using transposition flap repair   2.  Excision left inferior nasal lesion with defect measuring 3 mm and simple closure  Surgeon:  Yaneli Reeves D.O.  Anesthesia:  MAC with local  EBL:  1 ml  Findings: Basal cell carcinoma left superior nasal dorsum with 3-4 mm circumferential clear margins  Complications:  none  Condition:  stable  Past Medical History:   Diagnosis Date     Mild persistent asthma 6/26/2013        Allergies   Allergen Reactions     Seasonal Allergies      Current Outpatient Prescriptions   Medication     Beclomethasone Dipropionate (BECLOVENT IN)     fluticasone (FLOVENT HFA) 110 MCG/ACT Inhaler     olopatadine HCl (PATADAY) 0.2 % SOLN     [DISCONTINUED] albuterol (PROAIR HFA, PROVENTIL HFA, VENTOLIN HFA) 108 (90 BASE) MCG/ACT inhaler     No current facility-administered medications for this visit.       ROS: 10 point ROS neg other than the symptoms noted above in the HPI.  /62 (BP Location: Right arm, Patient Position: Chair, Cuff Size: Adult Regular)  Pulse 78  Temp 96.7  F (35.9  C) (Tympanic)  Ht 5' 11\" (1.803 m)  Wt 209 lb (94.8 kg)  SpO2 96%  BMI 29.15 kg/m2  General - The patient " is well nourished and well developed, and appears to have good nutritional status.  Alert and oriented to person and place, interactive.  Head and Face - Normocephalic and atraumatic, with no gross asymmetry noted of the contour of the facial features.  The facial nerve is intact, with strong symmetric movements.  Neck-no palpable lymphadenopathy or thyroid mass.  Trachea is midline.    Skin- Superior nasal dorsum - Cleansed area with hydrogen peroxide/normal saline. No seroma/hematoma. Full sensation of skin. No drainage or signs of infection.  Removed nylon sutures from central site, 2 sutures removed.   Wound edges are well approximated. Flap appears well healed.     ASSESSMENT:    ICD-10-CM    1. S/P excision of skin lesion, follow-up exam Z09    2. BCC (basal cell carcinoma), face C44.310    3. Encounter for removal of sutures Z48.02      Doing well.   Continue with wound cares.  Discussed hats/ sunscreen.   Return PRN      Bouchra Ferrer PA-C  ENT  [facility], [place]  [phone]      Again, thank you for allowing me to participate in the care of your patient.        Sincerely,        Bouchra Ferrer PA-C     Refer to the Assessment tab to view/cancel completed assessment.

## 2024-12-07 DIAGNOSIS — J45.20 MILD INTERMITTENT ASTHMA WITHOUT COMPLICATION: ICD-10-CM

## 2024-12-09 RX ORDER — FLUTICASONE PROPIONATE 110 UG/1
1 AEROSOL, METERED RESPIRATORY (INHALATION) 2 TIMES DAILY
Qty: 12 G | Refills: 3 | Status: SHIPPED | OUTPATIENT
Start: 2024-12-09

## 2024-12-09 NOTE — TELEPHONE ENCOUNTER
FLUTICASONE HFA 110MCG ORAL INH         Last Written Prescription Date:  10/30/23  Last Fill Quantity: 12 g,   # refills: 3  Last Office Visit: 5/15/24  Future Office visit:       Routing refill request to provider for review/approval because:      Inhaled Steroids Protocol [id number]/07/2024 03:10 AM   Protocol Details Asthma control assessment score within normal limits in last 6 months    Recent (6 mo) or future (90 days) visit within the authorizing provider's specialty            5/15/2024    10:50 AM   ACT Total Scores   ACT TOTAL SCORE (Goal Greater than or Equal to 20) 25   In the past 12 months, how many times did you visit the emergency room for your asthma without being admitted to the hospital? 0    In the past 12 months, how many times were you hospitalized overnight because of your asthma? 0        Patient-reported

## 2025-06-22 ENCOUNTER — HEALTH MAINTENANCE LETTER (OUTPATIENT)
Age: 70
End: 2025-06-22

## (undated) DEVICE — TUBING-SUCTION 20FT

## (undated) DEVICE — PAD CHUX UNDERPAD 30X36" P3036C

## (undated) DEVICE — PAD PERI INDIV WRAP 11" 2022A

## (undated) DEVICE — BLADE-SCALPEL #15

## (undated) DEVICE — IRRIGATION-H2O 1000ML

## (undated) DEVICE — SUTURE-ETHILON 5-0 PS-2 1666H

## (undated) DEVICE — DRSG GAUZE 4X4" 3033

## (undated) DEVICE — SUTURE-VICRYL 5-0 P-3 J493H

## (undated) DEVICE — LABEL-STERILE PREPRINTED FOR OR

## (undated) DEVICE — DRAPE TOWEL TIME OUT BEACON REMINDER STRL 811

## (undated) DEVICE — NDL-27G X 1 1/4" NON-SAFETY

## (undated) DEVICE — LUBRICATING JELLY 2.7GM T00137

## (undated) DEVICE — IRRIGATION-NACL 1000ML

## (undated) DEVICE — CAUTERY PAD-POLYHESIVE II ADULT

## (undated) DEVICE — TRANSRECTAL NEEDLE GUIDE DISPOSABLE

## (undated) DEVICE — LIGHT HANDLE COVER

## (undated) DEVICE — GLV-6.5 PROTEXIS PI CLASSIC LF/PF

## (undated) DEVICE — GOWN-SURG XL LVL 3 REINFORCED

## (undated) DEVICE — ENDO SNARE EXACTO COLD 9MM LOOP 2.4MMX230CM 00711115

## (undated) DEVICE — SOL WATER IRRIG 1000ML BOTTLE 2F7114

## (undated) DEVICE — PACK-SET UP-CUSTOM

## (undated) DEVICE — TUBING SUCTION 20FT N620A

## (undated) DEVICE — SUTURE-ETHILON 5-0 P-3 698G

## (undated) DEVICE — CANISTER-SUCTION 2000CC

## (undated) DEVICE — INSTRUMENT WIPE-VISIWIPE

## (undated) DEVICE — SUCTION MANIFOLD NEPTUNE 2 SYS 1 PORT 702-025-000

## (undated) DEVICE — CAUTERY PENCIL

## (undated) DEVICE — SOL WATER 1500ML

## (undated) DEVICE — CONNECTOR ERBEFLO 2 PORT 20325-215

## (undated) DEVICE — SENSOR-OXISENSOR II ADULT

## (undated) DEVICE — BLANKET-BAIR LOWER EXTREMITY

## (undated) DEVICE — SUTURE-SILK 3-0 SH K832H

## (undated) DEVICE — MARKER-SKIN REG

## (undated) DEVICE — NDL BX 18GAX25CM MC1825

## (undated) DEVICE — CAUTERY-INSULATED 2.75" EDGE

## (undated) DEVICE — DRSG-NON ADHERING 3 X 8 TELFA

## (undated) DEVICE — DRAPE-U DRAPE SPLIT SHEET 72" X 122"

## (undated) DEVICE — ENDO TRAP POLYP E-TRAP 00711099

## (undated) DEVICE — LABEL YELLOW TIME OUT CUSTOM SBA15TOFHB

## (undated) DEVICE — TRAY-SKIN PREP POVIDONE/IODINE

## (undated) DEVICE — SYRINGE-10CC FINGER

## (undated) DEVICE — SCD SLEEVE-KNEE REG.

## (undated) RX ORDER — LIDOCAINE HYDROCHLORIDE 20 MG/ML
INJECTION, SOLUTION EPIDURAL; INFILTRATION; INTRACAUDAL; PERINEURAL
Status: DISPENSED
Start: 2019-10-08

## (undated) RX ORDER — GENTAMICIN SULFATE 60 MG/50ML
INJECTION, SOLUTION INTRAVENOUS
Status: DISPENSED
Start: 2019-10-08

## (undated) RX ORDER — LIDOCAINE HYDROCHLORIDE 20 MG/ML
INJECTION, SOLUTION EPIDURAL; INFILTRATION; INTRACAUDAL; PERINEURAL
Status: DISPENSED
Start: 2020-10-06

## (undated) RX ORDER — LIDOCAINE HYDROCHLORIDE 20 MG/ML
JELLY TOPICAL
Status: DISPENSED
Start: 2020-10-06

## (undated) RX ORDER — PROPOFOL 10 MG/ML
INJECTION, EMULSION INTRAVENOUS
Status: DISPENSED
Start: 2020-10-06

## (undated) RX ORDER — LIDOCAINE HYDROCHLORIDE 10 MG/ML
INJECTION, SOLUTION EPIDURAL; INFILTRATION; INTRACAUDAL; PERINEURAL
Status: DISPENSED
Start: 2019-10-08

## (undated) RX ORDER — LIDOCAINE HYDROCHLORIDE 20 MG/ML
INJECTION, SOLUTION EPIDURAL; INFILTRATION; INTRACAUDAL; PERINEURAL
Status: DISPENSED
Start: 2018-08-22

## (undated) RX ORDER — PROPOFOL 10 MG/ML
INJECTION, EMULSION INTRAVENOUS
Status: DISPENSED
Start: 2018-08-22

## (undated) RX ORDER — GENTAMICIN SULFATE 60 MG/50ML
INJECTION, SOLUTION INTRAVENOUS
Status: DISPENSED
Start: 2020-10-06

## (undated) RX ORDER — LIDOCAINE HYDROCHLORIDE 20 MG/ML
JELLY TOPICAL
Status: DISPENSED
Start: 2019-10-08

## (undated) RX ORDER — ROCURONIUM BROMIDE 50 MG/5 ML
SYRINGE (ML) INTRAVENOUS
Status: DISPENSED
Start: 2018-08-22

## (undated) RX ORDER — LIDOCAINE HYDROCHLORIDE 10 MG/ML
INJECTION, SOLUTION EPIDURAL; INFILTRATION; INTRACAUDAL; PERINEURAL
Status: DISPENSED
Start: 2020-10-06

## (undated) RX ORDER — PROPOFOL 10 MG/ML
INJECTION, EMULSION INTRAVENOUS
Status: DISPENSED
Start: 2019-10-08

## (undated) RX ORDER — FENTANYL CITRATE 50 UG/ML
INJECTION, SOLUTION INTRAMUSCULAR; INTRAVENOUS
Status: DISPENSED
Start: 2018-08-22